# Patient Record
Sex: FEMALE | Race: BLACK OR AFRICAN AMERICAN | NOT HISPANIC OR LATINO | Employment: FULL TIME | URBAN - METROPOLITAN AREA
[De-identification: names, ages, dates, MRNs, and addresses within clinical notes are randomized per-mention and may not be internally consistent; named-entity substitution may affect disease eponyms.]

---

## 2017-02-08 ENCOUNTER — OFFICE VISIT (OUTPATIENT)
Dept: INTERNAL MEDICINE | Facility: CLINIC | Age: 37
End: 2017-02-08
Payer: COMMERCIAL

## 2017-02-08 VITALS
BODY MASS INDEX: 32.38 KG/M2 | TEMPERATURE: 98 F | SYSTOLIC BLOOD PRESSURE: 144 MMHG | HEART RATE: 90 BPM | WEIGHT: 182.75 LBS | HEIGHT: 63 IN | DIASTOLIC BLOOD PRESSURE: 100 MMHG | OXYGEN SATURATION: 99 %

## 2017-02-08 DIAGNOSIS — R09.81 NASAL CONGESTION: Primary | ICD-10-CM

## 2017-02-08 DIAGNOSIS — I10 ESSENTIAL HYPERTENSION: ICD-10-CM

## 2017-02-08 DIAGNOSIS — R09.89 CHEST CONGESTION: ICD-10-CM

## 2017-02-08 PROCEDURE — 3077F SYST BP >= 140 MM HG: CPT | Mod: S$GLB,,, | Performed by: PHYSICIAN ASSISTANT

## 2017-02-08 PROCEDURE — 99999 PR PBB SHADOW E&M-EST. PATIENT-LVL III: CPT | Mod: PBBFAC,,, | Performed by: PHYSICIAN ASSISTANT

## 2017-02-08 PROCEDURE — 3080F DIAST BP >= 90 MM HG: CPT | Mod: S$GLB,,, | Performed by: PHYSICIAN ASSISTANT

## 2017-02-08 PROCEDURE — 99213 OFFICE O/P EST LOW 20 MIN: CPT | Mod: S$GLB,,, | Performed by: PHYSICIAN ASSISTANT

## 2017-02-08 RX ORDER — PROMETHAZINE HYDROCHLORIDE AND DEXTROMETHORPHAN HYDROBROMIDE 6.25; 15 MG/5ML; MG/5ML
5 SYRUP ORAL EVERY 6 HOURS PRN
Qty: 118 ML | Refills: 0 | Status: SHIPPED | OUTPATIENT
Start: 2017-02-08 | End: 2017-02-18

## 2017-02-08 RX ORDER — ALBUTEROL SULFATE 90 UG/1
1-2 AEROSOL, METERED RESPIRATORY (INHALATION) EVERY 6 HOURS PRN
Qty: 1 INHALER | Refills: 0 | Status: SHIPPED | OUTPATIENT
Start: 2017-02-08 | End: 2017-03-08

## 2017-02-08 NOTE — MR AVS SNAPSHOT
[facility]  [address] [place] [address]  Phone: [phone]  Fax: [phone]                  Sarah Brown   2017 11:20 AM   Office Visit    Description:  Female : 1980   Provider:  KING Rebollar   [facility]           Reason for Visit     Cough     Nasal Congestion     Chest Pain     Back Pain           Diagnoses this Visit        Comments    Nasal congestion    -  Primary     Chest congestion                To Do List           Goals (5 Years of Data)     None       These Medications        Disp Refills Start End    promethazine-dextromethorphan (PROMETHAZINE-DM) 6.25-15 mg/5 mL Syrp 118 mL 0 2017    Take 5 mLs by mouth every 6 (six) hours as needed (cough). - Oral    Pharmacy: [facility]/pharmacy #[id number]  [place] - [address] [address] [facility] Ph #: [phone]       albuterol 90 mcg/actuation inhaler 1 Inhaler 0 2017    Inhale 1-2 puffs into the lungs every 6 (six) hours as needed for Wheezing. - Inhalation    Pharmacy: [facility]/pharmacy #[id number]  [place], [place] - [address] [address] [facility] Ph #: [phone]         [facility]     [facility] Nurse Care Line -  Assistance  Registered nurses in the [facility] provide clinical advisement, health education, appointment booking, and other advisory services.  Call for this free service at [phone].             Medications           Message regarding Medications     Verify the changes and/or additions to your medication regime listed below are the same as discussed with your clinician today.  If any of these changes or additions are incorrect, please notify your healthcare provider.        START taking these NEW medications        Refills    promethazine-dextromethorphan (PROMETHAZINE-DM) 6.25-15 mg/5 mL Syrp 0    Sig: Take 5 mLs by mouth every 6 (six) hours as needed (cough).    Class: Normal    Route: Oral    albuterol 90  "mcg/actuation inhaler 0    Sig: Inhale 1-2 puffs into the lungs every 6 (six) hours as needed for Wheezing.    Class: Normal    Route: Inhalation           Verify that the below list of medications is an accurate representation of the medications you are currently taking.  If none reported, the list may be blank. If incorrect, please contact your healthcare provider. Carry this list with you in case of emergency.           Current Medications     amlodipine (NORVASC) 5 MG tablet Take 1 tablet (5 mg total) by mouth once daily.    diclofenac (VOLTAREN) 50 MG EC tablet Take 1 tablet (50 mg total) by mouth 2 (two) times daily.    hydrocortisone-pramoxine (ANALPRAM-HC) 2.5-1 % Crea Place rectally 3 (three) times daily.    lisinopril-hydrochlorothiazide (PRINZIDE,ZESTORETIC) 20-12.5 mg per tablet Take 1 tablet by mouth once daily.    albuterol 90 mcg/actuation inhaler Inhale 1-2 puffs into the lungs every 6 (six) hours as needed for Wheezing.    promethazine-dextromethorphan (PROMETHAZINE-DM) 6.25-15 mg/5 mL Syrp Take 5 mLs by mouth every 6 (six) hours as needed (cough).           Clinical Reference Information           Your Vitals Were     BP Pulse Temp Height Weight SpO2    144/100 (BP Location: Left arm, Patient Position: Sitting, BP Method: Manual) 90 98.3 °F (36.8 °C) (Oral) 5' 3" (1.6 m) 82.9 kg (182 lb 12.2 oz) 99%    BMI                32.37 kg/m2          Blood Pressure          Most Recent Value    BP  (!)  144/100      Allergies as of 2/8/2017     No Known Allergies      Immunizations Administered on Date of Encounter - 2/8/2017     None      Language Assistance Services     ATTENTION: Language assistance services are available, free of charge. Please call [phone].      ATENCIÓN: Si lynnela andriy, tiene a mcclendon disposición servicios gratuitos de asistencia lingüística. Llame al [phone].     CHÚ Ý: N?u b?n nói Ti?ng Vi?t, có các d?ch v? h? tr? ngôn ng? mi?n phí dành cho b?n. G?i s? [phone].   "       [facility] complies with applicable Federal civil rights laws and does not discriminate on the basis of race, color, national origin, age, disability, or sex.

## 2017-02-08 NOTE — PROGRESS NOTES
Subjective:       Patient ID: Sarah Brown is a 36 y.o. female.    Chief Complaint: Cough; Nasal Congestion; Chest Pain; and Back Pain    URI    This is a new problem. Episode onset: 4 days. The problem has been unchanged. There has been no fever. Associated symptoms include congestion, coughing, sinus pain, a sore throat and swollen glands. Pertinent negatives include no abdominal pain, chest pain, diarrhea, dysuria, ear pain, headaches, joint pain, joint swelling, nausea, neck pain, plugged ear sensation, rash, sneezing, vomiting or wheezing. Treatments tried: dayquil        Past Medical History   Diagnosis Date    Hypertension      diet controled    Migraines      with aura, per pt       Current Outpatient Prescriptions   Medication Sig Dispense Refill    amlodipine (NORVASC) 5 MG tablet Take 1 tablet (5 mg total) by mouth once daily. 30 tablet 3    diclofenac (VOLTAREN) 50 MG EC tablet Take 1 tablet (50 mg total) by mouth 2 (two) times daily. 60 tablet 1    hydrocortisone-pramoxine (ANALPRAM-HC) 2.5-1 % Crea Place rectally 3 (three) times daily. 1 Tube 0    lisinopril-hydrochlorothiazide (PRINZIDE,ZESTORETIC) 20-12.5 mg per tablet Take 1 tablet by mouth once daily. 30 tablet 0    albuterol 90 mcg/actuation inhaler Inhale 1-2 puffs into the lungs every 6 (six) hours as needed for Wheezing. 1 Inhaler 0    promethazine-dextromethorphan (PROMETHAZINE-DM) 6.25-15 mg/5 mL Syrp Take 5 mLs by mouth every 6 (six) hours as needed (cough). 118 mL 0     No current facility-administered medications for this visit.        Review of Systems   HENT: Positive for congestion and sore throat. Negative for ear pain and sneezing.    Respiratory: Positive for cough. Negative for wheezing.    Cardiovascular: Negative for chest pain.   Gastrointestinal: Negative for abdominal pain, diarrhea, nausea and vomiting.   Genitourinary: Negative for dysuria.   Musculoskeletal: Negative for joint pain and neck pain.   Skin: Negative  "for rash.   Neurological: Negative for headaches.       Objective:     Visit Vitals    BP (!) 144/100 (BP Location: Left arm, Patient Position: Sitting, BP Method: Manual)    Pulse 90    Temp 98.3 °F (36.8 °C) (Oral)    Ht 5' 3" (1.6 m)    Wt 82.9 kg (182 lb 12.2 oz)    SpO2 99%    BMI 32.37 kg/m2        Physical Exam   Constitutional: She is oriented to person, place, and time. She appears well-developed and well-nourished. No distress.   HENT:   Head: Normocephalic and atraumatic.   Right Ear: Hearing, tympanic membrane, external ear and ear canal normal.   Left Ear: Hearing, tympanic membrane, external ear and ear canal normal.   Nose: No sinus tenderness. Right sinus exhibits no maxillary sinus tenderness and no frontal sinus tenderness. Left sinus exhibits no maxillary sinus tenderness and no frontal sinus tenderness.   Mouth/Throat: Uvula is midline, oropharynx is clear and moist and mucous membranes are normal. No oropharyngeal exudate, posterior oropharyngeal edema, posterior oropharyngeal erythema or tonsillar abscesses.   Swollen nasal turbinates   Post nasal drip    Eyes: Conjunctivae are normal. Pupils are equal, round, and reactive to light.   Neck: Normal range of motion. Neck supple.   Cardiovascular: Normal rate, regular rhythm and normal heart sounds.    Pulmonary/Chest: Effort normal and breath sounds normal. No respiratory distress.   Lymphadenopathy:     She has no cervical adenopathy.   Neurological: She is alert and oriented to person, place, and time.   Skin: Skin is warm.   Psychiatric: She has a normal mood and affect. Her behavior is normal. Judgment and thought content normal.   Vitals reviewed.        Lab Results   Component Value Date    WBC 3.59 (L) 10/27/2016    HGB 12.9 10/27/2016    HCT 38.9 10/27/2016     10/27/2016    CHOL 149 10/11/2013    TRIG 85 10/11/2013    HDL 47 10/11/2013    ALT 17 10/11/2013    AST 15 10/11/2013     10/27/2016    K 4.2 10/27/2016    CL " 107 10/27/2016    CREATININE 0.8 10/27/2016    BUN 9 10/27/2016    CO2 25 10/27/2016    TSH 0.929 10/27/2016       Assessment:       1. Nasal congestion    2. Chest congestion    3. Essential hypertension        Plan:   Nasal congestion  Saline sprays, cough medication will have antihistamine in it.  May use Afrin spray for up to 3 days to get some relief.   Chest congestion- suggested MUCINEX (Avoid decongestants because of blood pressure)   -     albuterol 90 mcg/actuation inhaler; Inhale 1-2 puffs into the lungs every 6 (six) hours as needed for Wheezing.  Dispense: 1 Inhaler; Refill: 0  Nasopharyngitis   -     promethazine-dextromethorphan (PROMETHAZINE-DM) 6.25-15 mg/5 mL Syrp; Take 5 mLs by mouth every 6 (six) hours as needed (cough).  Dispense: 118 mL; Refill: 0    Essential hypertension   Avoid oral decongestants as this is likely the reason for elevated  BP

## 2017-03-08 ENCOUNTER — OFFICE VISIT (OUTPATIENT)
Dept: INTERNAL MEDICINE | Facility: CLINIC | Age: 37
End: 2017-03-08
Payer: COMMERCIAL

## 2017-03-08 ENCOUNTER — TELEPHONE (OUTPATIENT)
Dept: INTERNAL MEDICINE | Facility: CLINIC | Age: 37
End: 2017-03-08

## 2017-03-08 VITALS
OXYGEN SATURATION: 98 % | HEIGHT: 63 IN | DIASTOLIC BLOOD PRESSURE: 94 MMHG | HEART RATE: 75 BPM | WEIGHT: 177.69 LBS | BODY MASS INDEX: 31.48 KG/M2 | TEMPERATURE: 99 F | SYSTOLIC BLOOD PRESSURE: 162 MMHG

## 2017-03-08 DIAGNOSIS — F32.A DEPRESSION, UNSPECIFIED DEPRESSION TYPE: ICD-10-CM

## 2017-03-08 DIAGNOSIS — I10 ESSENTIAL HYPERTENSION: Primary | ICD-10-CM

## 2017-03-08 DIAGNOSIS — G43.019 INTRACTABLE MIGRAINE WITHOUT AURA AND WITHOUT STATUS MIGRAINOSUS: ICD-10-CM

## 2017-03-08 PROCEDURE — 99214 OFFICE O/P EST MOD 30 MIN: CPT | Mod: S$GLB,,, | Performed by: FAMILY MEDICINE

## 2017-03-08 PROCEDURE — 3080F DIAST BP >= 90 MM HG: CPT | Mod: S$GLB,,, | Performed by: FAMILY MEDICINE

## 2017-03-08 PROCEDURE — 99999 PR PBB SHADOW E&M-EST. PATIENT-LVL III: CPT | Mod: PBBFAC,,, | Performed by: FAMILY MEDICINE

## 2017-03-08 PROCEDURE — 1160F RVW MEDS BY RX/DR IN RCRD: CPT | Mod: S$GLB,,, | Performed by: FAMILY MEDICINE

## 2017-03-08 PROCEDURE — 3077F SYST BP >= 140 MM HG: CPT | Mod: S$GLB,,, | Performed by: FAMILY MEDICINE

## 2017-03-08 RX ORDER — LISINOPRIL AND HYDROCHLOROTHIAZIDE 12.5; 2 MG/1; MG/1
1 TABLET ORAL DAILY
Qty: 90 TABLET | Refills: 0 | Status: SHIPPED | OUTPATIENT
Start: 2017-03-08 | End: 2019-12-11

## 2017-03-08 RX ORDER — NORTRIPTYLINE HYDROCHLORIDE 50 MG/1
50 CAPSULE ORAL NIGHTLY
Qty: 30 CAPSULE | Refills: 0 | Status: SHIPPED | OUTPATIENT
Start: 2017-03-08 | End: 2019-07-10

## 2017-03-08 RX ORDER — NAPROXEN SODIUM 550 MG/1
550 TABLET ORAL 2 TIMES DAILY PRN
Qty: 30 TABLET | Refills: 0 | Status: SHIPPED | OUTPATIENT
Start: 2017-03-08 | End: 2019-07-10

## 2017-03-08 RX ORDER — AMLODIPINE BESYLATE 5 MG/1
5 TABLET ORAL DAILY
Qty: 90 TABLET | Refills: 0 | Status: SHIPPED | OUTPATIENT
Start: 2017-03-08 | End: 2019-07-10 | Stop reason: SDUPTHER

## 2017-03-08 NOTE — PROGRESS NOTES
Subjective:   Patient ID: Sarah Brown is a 36 y.o. female.  Chief Complaint:  No chief complaint on file.    HPI Comments: Chronic recurrent migraine headaches. Normal labs and CT in past. No Neurology consult.6  Poor historian, but unclear if any preventative migraine medicines used in past.  No present prescription meds    Blood pressure not controlled, out of both medications.    Depressed mood.  Reports previous anxiety issues treated with unknown medication.    Migraine    This is a chronic problem. The current episode started more than 1 year ago. The problem occurs daily. The problem has been unchanged. The pain is located in the right unilateral, frontal and temporal region. The pain does not radiate. The pain quality is similar to prior headaches. The quality of the pain is described as sharp and stabbing. The pain is at a severity of 10/10. The pain is severe. Associated symptoms include back pain and neck pain. Pertinent negatives include no abdominal pain, abnormal behavior, anorexia, blurred vision, coughing, dizziness, drainage, ear pain, eye pain, eye redness, eye watering, facial sweating, fever, hearing loss, insomnia, loss of balance, muscle aches, nausea, numbness, phonophobia, photophobia, rhinorrhea, scalp tenderness, seizures, sinus pressure, sore throat, swollen glands, tingling, tinnitus, visual change, vomiting, weakness or weight loss. The symptoms are aggravated by work and emotional stress. She has tried acetaminophen, NSAIDs and ketorolac injections for the symptoms. The treatment provided moderate relief. Her past medical history is significant for hypertension and migraine headaches.   Mental Health Problem   The primary symptoms include dysphoric mood. The current episode started more than 1 month ago.   The dysphoric mood began more than 2 weeks ago. The mood has been worsening since its onset. The mood includes feelings of sadness and tearfulness.   The degree of incapacity that  she is experiencing as a consequence of her illness is moderate. Additional symptoms of the illness include anhedonia, fatigue, psychomotor retardation, attention impairment and headaches. Additional symptoms of the illness do not include no insomnia, no hypersomnia, no appetite change, no unexpected weight change, no agitation, no feelings of worthlessness, no euphoric mood, no increased goal-directed activity, no flight of ideas, no inflated self-esteem, no decreased need for sleep, not distractible, no poor judgment, no visual change, no abdominal pain or no seizures. She does not admit to suicidal ideas. She does not have a plan to commit suicide. She does not contemplate harming herself. She has not already injured self. She does not contemplate injuring another person. She has not already  injured another person. Risk factors that are present for mental illness include a history of mental illness.     Review of Systems   Constitutional: Positive for fatigue. Negative for appetite change, fever, unexpected weight change and weight loss.   HENT: Negative for ear pain, hearing loss, rhinorrhea, sinus pressure, sore throat and tinnitus.    Eyes: Negative for blurred vision, photophobia, pain and redness.   Respiratory: Negative for cough, chest tightness, shortness of breath and wheezing.    Cardiovascular: Negative for chest pain, palpitations and leg swelling.   Gastrointestinal: Negative for abdominal pain, anorexia, constipation, diarrhea, nausea and vomiting.   Genitourinary: Negative for difficulty urinating.   Musculoskeletal: Positive for back pain and neck pain. Negative for myalgias.   Skin: Negative for rash.   Neurological: Positive for headaches. Negative for dizziness, tingling, seizures, syncope, weakness, light-headedness, numbness and loss of balance.   Psychiatric/Behavioral: Positive for dysphoric mood. Negative for agitation. The patient does not have insomnia.        Current Outpatient  "Prescriptions:     amlodipine (NORVASC) 5 MG tablet, Take 1 tablet (5 mg total) by mouth once daily., Disp: 90 tablet, Rfl: 0    lisinopril-hydrochlorothiazide (PRINZIDE,ZESTORETIC) 20-12.5 mg per tablet, Take 1 tablet by mouth once daily., Disp: 90 tablet, Rfl: 0    naproxen sodium (ANAPROX) 550 MG tablet, Take 1 tablet (550 mg total) by mouth 2 (two) times daily as needed (Headache)., Disp: 30 tablet, Rfl: 0    nortriptyline (PAMELOR) 50 MG capsule, Take 1 capsule (50 mg total) by mouth every evening., Disp: 30 capsule, Rfl: 0     Objective:   BP (!) 162/94 (BP Location: Right arm, Patient Position: Sitting, BP Method: Manual)  Pulse 75  Temp 98.6 °F (37 °C) (Tympanic)   Ht 5' 3" (1.6 m)  Wt 80.6 kg (177 lb 11.1 oz)  LMP 02/08/2017  SpO2 98%  BMI 31.48 kg/m2     Physical Exam   Constitutional: She is oriented to person, place, and time. Vital signs are normal. She appears well-developed and well-nourished. No distress.   Eyes: Conjunctivae, EOM and lids are normal. Pupils are equal, round, and reactive to light.   Neck: No JVD present.   Cardiovascular: Normal rate, regular rhythm and normal heart sounds.  Exam reveals no gallop and no friction rub.    No murmur heard.  Pulmonary/Chest: Effort normal and breath sounds normal. She has no wheezes. She has no rhonchi. She has no rales.   Abdominal: Soft. She exhibits no distension. There is no tenderness.   Musculoskeletal: She exhibits no edema.   Neurological: She is alert and oriented to person, place, and time. She displays a negative Romberg sign. Coordination and gait normal.   Skin: Skin is warm and dry. No rash noted.   Psychiatric: Judgment and thought content normal. Her mood appears not anxious. Her affect is not angry, not blunt and not inappropriate. Her speech is delayed. Her speech is not rapid and/or pressured, not tangential and not slurred. She is slowed and withdrawn. She is not agitated, not aggressive, not hyperactive, not actively " hallucinating and not combative. Cognition and memory are normal. She exhibits a depressed mood. She is communicative.   Depressed mood.  Flat affect, terrible eye contact, monotone speech. She is inattentive.   Nursing note and vitals reviewed.    Assessment:     1. Essential hypertension    2. Intractable migraine without aura and without status migrainosus    3. Depression, unspecified depression type      Plan:   Essential hypertension  -     amlodipine (NORVASC) 5 MG tablet; Take 1 tablet (5 mg total) by mouth once daily.  Dispense: 90 tablet; Refill: 0  -     lisinopril-hydrochlorothiazide (PRINZIDE,ZESTORETIC) 20-12.5 mg per tablet; Take 1 tablet by mouth once daily.  Dispense: 90 tablet; Refill: 0  Restart blood pressure medication.  Discussed may also benefit with headache control.  Recheck 1 month.    Intractable migraine without aura and without status migrainosus  -     nortriptyline (PAMELOR) 50 MG capsule; Take 1 capsule (50 mg total) by mouth every evening.  Dispense: 30 capsule; Refill: 0  -     naproxen sodium (ANAPROX) 550 MG tablet; Take 1 tablet (550 mg total) by mouth 2 (two) times daily as needed (Headache).  Dispense: 30 tablet; Refill: 0  Start Pamelor nightly for prophylaxis.  Anaprox DS as needed.    If above no help and no improvement headache pattern with psychiatric treatment, consider Topamax or neurology referral.    Depression, unspecified depression type  -     Ambulatory Referral to Psychiatry  Patient agrees to contact psychiatry for appointment.    Return to clinic 1 month.

## 2017-03-08 NOTE — LETTER
March 8, 2017                 [facility]  Internal Medicine  [address] [place] [address]  Phone: [phone]  Fax: [phone]   March 8, 2017     Patient: Sarah Brown   YOB: 1980   Date of Visit: 3/8/2017       To Whom it May Concern:    Sarah Brown was seen in my clinic on 3/8/2017. Please excuse her for 3/7/2017 and 3/8/2017.    If you have any questions or concerns, please don't hesitate to call.    Sincerely,         Kunal Ramos MD/Dee Burr LPN

## 2017-03-08 NOTE — TELEPHONE ENCOUNTER
Pt stated that psychiatry doesn't have anything available until May 1st. Pt is wanting other recommendations for psychiatrist. Told pt that I would discuss with Dr. Ramos and also advised that she contact her insurance to see who they cover. Pt verbalized understanding.

## 2017-03-08 NOTE — MR AVS SNAPSHOT
[facility]  [address] [place] [address]  Phone: [phone]  Fax: [phone]                  Sarah HEMPHILL Stephanie   3/8/2017 7:20 AM   Office Visit    Description:  Female : 1980   Provider:  Kunal Ramos MD   Department:  [facility] - Internal Medicine           Diagnoses this Visit        Comments    Essential hypertension    -  Primary     Intractable migraine without aura and without status migrainosus         Depression, unspecified depression type                To Do List           Future Appointments        Provider Department Dept Phone    2017 8:00 AM Jennifer Rose MD [facility] [phone]      Goals (5 Years of Data)     None      Follow-Up and Disposition     Return in about 1 month (around 2017) for Dr Rose.       These Medications        Disp Refills Start End    amlodipine (NORVASC) 5 MG tablet 90 tablet 0 3/8/2017 3/8/2018    Take 1 tablet (5 mg total) by mouth once daily. - Oral    Pharmacy: [facility]/pharmacy #[address] [place] [address] Ph #: [phone]       lisinopril-hydrochlorothiazide (PRINZIDE,ZESTORETIC) 20-12.5 mg per tablet 90 tablet 0 3/8/2017 3/8/2018    Take 1 tablet by mouth once daily. - Oral    Pharmacy: [facility]/pharmacy #[address] [place], [place] [address] [facility] Ph #: [phone]       nortriptyline (PAMELOR) 50 MG capsule 30 capsule 0 3/8/2017 3/8/2018    Take 1 capsule (50 mg total) by mouth every evening. - Oral    Pharmacy: [facility]/pharmacy #[address] [place], [place] [address] [facility] Ph #: [phone]       naproxen sodium (ANAPROX) 550 MG tablet 30 tablet 0 3/8/2017     Take 1 tablet (550 mg total) by mouth 2 (two) times daily as needed (Headache). - Oral    Pharmacy: [facility]/pharmacy #[address] [place], [place] [address] [id number] [facility] Ph #: [phone]         [facility]     [facility] Care Line -  Assistance  Registered nurses in the [facility] provide  clinical advisement, health education, appointment booking, and other advisory services.  Call for this free service at [phone].             Medications           Message regarding Medications     Verify the changes and/or additions to your medication regime listed below are the same as discussed with your clinician today.  If any of these changes or additions are incorrect, please notify your healthcare provider.        START taking these NEW medications        Refills    nortriptyline (PAMELOR) 50 MG capsule 0    Sig: Take 1 capsule (50 mg total) by mouth every evening.    Class: Normal    Route: Oral    naproxen sodium (ANAPROX) 550 MG tablet 0    Sig: Take 1 tablet (550 mg total) by mouth 2 (two) times daily as needed (Headache).    Class: Normal    Route: Oral      STOP taking these medications     albuterol 90 mcg/actuation inhaler Inhale 1-2 puffs into the lungs every 6 (six) hours as needed for Wheezing.    hydrocortisone-pramoxine (ANALPRAM-HC) 2.5-1 % Crea Place rectally 3 (three) times daily.    diclofenac (VOLTAREN) 50 MG EC tablet Take 1 tablet (50 mg total) by mouth 2 (two) times daily.           Verify that the below list of medications is an accurate representation of the medications you are currently taking.  If none reported, the list may be blank. If incorrect, please contact your healthcare provider. Carry this list with you in case of emergency.           Current Medications     amlodipine (NORVASC) 5 MG tablet Take 1 tablet (5 mg total) by mouth once daily.    lisinopril-hydrochlorothiazide (PRINZIDE,ZESTORETIC) 20-12.5 mg per tablet Take 1 tablet by mouth once daily.    naproxen sodium (ANAPROX) 550 MG tablet Take 1 tablet (550 mg total) by mouth 2 (two) times daily as needed (Headache).    nortriptyline (PAMELOR) 50 MG capsule Take 1 capsule (50 mg total) by mouth every evening.           Clinical Reference Information           Your Vitals Were     BP Pulse Temp Height    162/94 (BP  "Location: Right arm, Patient Position: Sitting, BP Method: Manual) 75 98.6 °F (37 °C) (Tympanic) 5' 3" (1.6 m)    Weight Last Period SpO2 BMI    80.6 kg (177 lb 11.1 oz) 02/08/2017 98% 31.48 kg/m2      Blood Pressure          Most Recent Value    BP  (!)  162/94      Allergies as of 3/8/2017     No Known Allergies      Immunizations Administered on Date of Encounter - 3/8/2017     None      Orders Placed During Today's Visit      Normal Orders This Visit    Ambulatory Referral to Psychiatry       Language Assistance Services     ATTENTION: Language assistance services are available, free of charge. Please call [phone].      ATENCIÓN: Si habla andriy, tiene a mcclendon disposición servicios gratuitos de asistencia lingüística. Llame al [phone].     CLARK Ý: N?u b?n nói Ti?ng Vi?t, có các d?ch v? h? tr? ngôn ng? mi?n phí dành cho b?n. G?i s? [phone].         [facility] - [facility] complies with applicable Federal civil rights laws and does not discriminate on the basis of race, color, national origin, age, disability, or sex.        "

## 2017-03-08 NOTE — TELEPHONE ENCOUNTER
MD Dee Li LPN        Caller: Unspecified (Today,  8:17 AM)                     Needs to contact her insurance to see what psychiatry providers are on her plan.   All psychiatrist require patient to call directly for appointment.   Once she makes contact/has an appointment we can complete referral if needed.   If significant worsening of her mood before she can be seen as an outpatient, go to the [facility] ER for evaluation

## 2017-03-22 ENCOUNTER — PATIENT OUTREACH (OUTPATIENT)
Dept: ADMINISTRATIVE | Facility: HOSPITAL | Age: 37
End: 2017-03-22

## 2018-04-10 RX ORDER — ALBUTEROL SULFATE 90 UG/1
1-2 AEROSOL, METERED RESPIRATORY (INHALATION) EVERY 6 HOURS PRN
Qty: 8.5 INHALER | Refills: 0 | OUTPATIENT
Start: 2018-04-10 | End: 2018-04-20

## 2018-06-28 ENCOUNTER — PATIENT OUTREACH (OUTPATIENT)
Dept: ADMINISTRATIVE | Facility: HOSPITAL | Age: 38
End: 2018-06-28

## 2018-06-28 NOTE — PROGRESS NOTES
I have attempted without success to contact this patient to schedule an appointment for annual to include blood pressure recheck and other health maintenance. Patient not available, left voicemail.

## 2018-07-11 ENCOUNTER — PATIENT OUTREACH (OUTPATIENT)
Dept: ADMINISTRATIVE | Facility: HOSPITAL | Age: 38
End: 2018-07-11

## 2018-07-11 NOTE — PROGRESS NOTES
I have attempted without success to contact this patient to schedule an appointment for annual to include blood pressure recheck, cervical cancer screening and other health maintenance. Patient not available, left voicemail. (second attempt)

## 2018-09-19 ENCOUNTER — PATIENT OUTREACH (OUTPATIENT)
Dept: ADMINISTRATIVE | Facility: HOSPITAL | Age: 38
End: 2018-09-19

## 2018-12-13 ENCOUNTER — PATIENT OUTREACH (OUTPATIENT)
Dept: ADMINISTRATIVE | Facility: HOSPITAL | Age: 38
End: 2018-12-13

## 2019-07-01 ENCOUNTER — TELEPHONE (OUTPATIENT)
Dept: OBSTETRICS AND GYNECOLOGY | Facility: CLINIC | Age: 39
End: 2019-07-01

## 2019-07-01 ENCOUNTER — TELEPHONE (OUTPATIENT)
Dept: INTERNAL MEDICINE | Facility: CLINIC | Age: 39
End: 2019-07-01

## 2019-07-01 DIAGNOSIS — Z12.39 BREAST SCREENING: Primary | ICD-10-CM

## 2019-07-01 NOTE — TELEPHONE ENCOUNTER
Spoke to patient and scheduled her appointment for 07/10/19 at 2:15pm to see Dr. Tam at the [place] location. Patient verbalized understanding.

## 2019-07-01 NOTE — TELEPHONE ENCOUNTER
----- Message from Kaleigh Wang sent at 7/1/2019  3:59 PM CDT -----  Contact: self  needs to schedule 7/10 at [facility] for e...[phone] ([place])

## 2019-07-01 NOTE — TELEPHONE ENCOUNTER
----- Message from Shady Laughlin MA sent at 7/1/2019  4:10 PM CDT -----  Patient is requesting MMG order be put in so an appointment can be scheduled.  ----- Message -----  From: Kaleigh Wang  Sent: 7/1/2019   4:00 PM  To: [facility] Rodriguez Staff    .Type:  Mammogram    Caller is requesting to schedule their annual mammogram appointment.  Order is not listed in EPIC.  Please enter order and contact patient to schedule.  Name of Caller:self  Where would they [facility] performed[facility]  Would the patient rather a call back or a response via [facility]? call  Best Call Back Number:.[phone] (home)   Additional Information:

## 2019-07-02 NOTE — TELEPHONE ENCOUNTER
Spoke with patient to schedule MMG. Was able to schedule patient on 7/10/2019. Patient expressed understanding and voiced no other questions or concerns./[email or web address]

## 2019-07-10 ENCOUNTER — HOSPITAL ENCOUNTER (OUTPATIENT)
Dept: RADIOLOGY | Facility: HOSPITAL | Age: 39
Discharge: HOME | End: 2019-07-10
Attending: FAMILY MEDICINE
Payer: COMMERCIAL

## 2019-07-10 ENCOUNTER — OFFICE VISIT (OUTPATIENT)
Dept: INTERNAL MEDICINE | Facility: CLINIC | Age: 39
End: 2019-07-10
Payer: COMMERCIAL

## 2019-07-10 VITALS
HEART RATE: 75 BPM | RESPIRATION RATE: 16 BRPM | HEIGHT: 63 IN | BODY MASS INDEX: 26.49 KG/M2 | SYSTOLIC BLOOD PRESSURE: 146 MMHG | OXYGEN SATURATION: 99 % | TEMPERATURE: 99 F | DIASTOLIC BLOOD PRESSURE: 92 MMHG | WEIGHT: 149.5 LBS

## 2019-07-10 DIAGNOSIS — G43.019 INTRACTABLE MIGRAINE WITHOUT AURA AND WITHOUT STATUS MIGRAINOSUS: ICD-10-CM

## 2019-07-10 DIAGNOSIS — B35.3 TINEA PEDIS OF BOTH FEET: ICD-10-CM

## 2019-07-10 DIAGNOSIS — Z12.39 BREAST SCREENING: ICD-10-CM

## 2019-07-10 DIAGNOSIS — Z23 NEED FOR DIPHTHERIA-TETANUS-PERTUSSIS (TDAP) VACCINE: ICD-10-CM

## 2019-07-10 DIAGNOSIS — Z00.00 ROUTINE GENERAL MEDICAL EXAMINATION AT A HEALTH CARE FACILITY: Primary | ICD-10-CM

## 2019-07-10 DIAGNOSIS — I10 ESSENTIAL HYPERTENSION: ICD-10-CM

## 2019-07-10 DIAGNOSIS — M47.26 OSTEOARTHRITIS OF SPINE WITH RADICULOPATHY, LUMBAR REGION: ICD-10-CM

## 2019-07-10 DIAGNOSIS — F41.8 ANXIETY ASSOCIATED WITH DEPRESSION: ICD-10-CM

## 2019-07-10 PROCEDURE — 3080F PR MOST RECENT DIASTOLIC BLOOD PRESSURE >= 90 MM HG: ICD-10-PCS | Mod: CPTII,S$GLB,, | Performed by: FAMILY MEDICINE

## 2019-07-10 PROCEDURE — 90715 TDAP VACCINE 7 YRS/> IM: CPT | Mod: S$GLB,,, | Performed by: FAMILY MEDICINE

## 2019-07-10 PROCEDURE — 99999 PR PBB SHADOW E&M-EST. PATIENT-LVL III: CPT | Mod: PBBFAC,,, | Performed by: FAMILY MEDICINE

## 2019-07-10 PROCEDURE — 99396 PR PREVENTIVE VISIT,EST,40-64: ICD-10-PCS | Mod: 25,S$GLB,, | Performed by: FAMILY MEDICINE

## 2019-07-10 PROCEDURE — 90715 TDAP VACCINE GREATER THAN OR EQUAL TO 7YO IM: ICD-10-PCS | Mod: S$GLB,,, | Performed by: FAMILY MEDICINE

## 2019-07-10 PROCEDURE — 3077F PR MOST RECENT SYSTOLIC BLOOD PRESSURE >= 140 MM HG: ICD-10-PCS | Mod: CPTII,S$GLB,, | Performed by: FAMILY MEDICINE

## 2019-07-10 PROCEDURE — 90471 IMMUNIZATION ADMIN: CPT | Mod: 59,S$GLB,, | Performed by: FAMILY MEDICINE

## 2019-07-10 PROCEDURE — 3008F BODY MASS INDEX DOCD: CPT | Mod: CPTII,S$GLB,, | Performed by: FAMILY MEDICINE

## 2019-07-10 PROCEDURE — 3077F SYST BP >= 140 MM HG: CPT | Mod: CPTII,S$GLB,, | Performed by: FAMILY MEDICINE

## 2019-07-10 PROCEDURE — 3080F DIAST BP >= 90 MM HG: CPT | Mod: CPTII,S$GLB,, | Performed by: FAMILY MEDICINE

## 2019-07-10 PROCEDURE — 99999 PR PBB SHADOW E&M-EST. PATIENT-LVL III: ICD-10-PCS | Mod: PBBFAC,,, | Performed by: FAMILY MEDICINE

## 2019-07-10 PROCEDURE — 99396 PREV VISIT EST AGE 40-64: CPT | Mod: 25,S$GLB,, | Performed by: FAMILY MEDICINE

## 2019-07-10 PROCEDURE — 90471 TDAP VACCINE GREATER THAN OR EQUAL TO 7YO IM: ICD-10-PCS | Mod: 59,S$GLB,, | Performed by: FAMILY MEDICINE

## 2019-07-10 PROCEDURE — 3008F PR BODY MASS INDEX (BMI) DOCUMENTED: ICD-10-PCS | Mod: CPTII,S$GLB,, | Performed by: FAMILY MEDICINE

## 2019-07-10 RX ORDER — AMLODIPINE BESYLATE 5 MG/1
5 TABLET ORAL DAILY
Qty: 90 TABLET | Refills: 0 | Status: SHIPPED | OUTPATIENT
Start: 2019-07-10 | End: 2019-10-02 | Stop reason: SDUPTHER

## 2019-07-10 RX ORDER — CLOTRIMAZOLE 1 %
CREAM (GRAM) TOPICAL 2 TIMES DAILY
Qty: 28 G | Refills: 0 | Status: SHIPPED | OUTPATIENT
Start: 2019-07-10 | End: 2019-12-11

## 2019-07-10 RX ORDER — SERTRALINE HYDROCHLORIDE 100 MG/1
TABLET, FILM COATED ORAL
COMMUNITY
Start: 2019-06-25 | End: 2020-07-22

## 2019-07-10 RX ORDER — EMTRICITABINE AND TENOFOVIR DISOPROXIL FUMARATE 200; 300 MG/1; MG/1
TABLET, FILM COATED ORAL
COMMUNITY
Start: 2019-07-05 | End: 2019-12-11

## 2019-07-10 RX ORDER — QUETIAPINE FUMARATE 100 MG/1
TABLET, FILM COATED ORAL
COMMUNITY
Start: 2019-06-25 | End: 2020-07-22

## 2019-07-10 NOTE — PROGRESS NOTES
"Subjective:       Patient ID: Sarah Brown is a 39 y.o. female.    Chief Complaint: Annual Exam    39-year-old female patient with Patient Active Problem List:     HTN (hypertension)     Migraines     Osteoarthritis of spine with radiculopathy, lumbar region     Anxiety associated with depression  Here for routine annual physicals.  Patient has been out of blood pressure medication lately, requesting refill today  Patient reports that a migraine headaches has been stable  Has been followed by [facility]  for anxiety associated with depression, and has been doing well with Seroquel  And Zoloft  Currently being treated for pre exposure to HIV prophylaxis, with Truvada   Denies any chest pain or difficulty breathing, abdominal discomfort nausea vomiting  Has been having rash in between the toenails for which she has tried over-the-counter Lamisil cream with no response    Review of Systems   Constitutional: Negative for fatigue.   Eyes: Negative for visual disturbance.   Respiratory: Negative for shortness of breath.    Cardiovascular: Negative for chest pain and leg swelling.   Gastrointestinal: Negative for abdominal pain, nausea and vomiting.   Musculoskeletal: Negative for myalgias.   Skin: Positive for rash.   Neurological: Negative for light-headedness and headaches.   Psychiatric/Behavioral: Positive for dysphoric mood. Negative for sleep disturbance. The patient is nervous/anxious.          BP (!) 146/92 (BP Location: Right arm, Patient Position: Sitting, BP Method: Medium (Manual))   Pulse 75   Temp 98.7 °F (37.1 °C) (Tympanic)   Resp 16   Ht 5' 3" (1.6 m)   Wt 67.8 kg (149 lb 7.6 oz)   SpO2 99%   BMI 26.48 kg/m²   Objective:      Physical Exam   Constitutional: She is oriented to person, place, and time. She appears well-developed and well-nourished.   HENT:   Head: Normocephalic and atraumatic.   Mouth/Throat: Oropharynx is clear and moist.   Cardiovascular: Normal rate, " regular rhythm and normal heart sounds.   No murmur heard.  Pulmonary/Chest: Effort normal and breath sounds normal. She has no wheezes.   Abdominal: Soft. Bowel sounds are normal. There is no tenderness.   Musculoskeletal: She exhibits no edema.   Neurological: She is alert and oriented to person, place, and time.   Skin: Skin is warm and dry. Rash noted.   Positive for hyperpigmented rash noted in between multiple toenails to bilateral feet   Psychiatric: She has a normal mood and affect.         Assessment/Plan:   1. Routine general medical examination at a health care facility  - CBC auto differential; Future  - Comprehensive metabolic panel; Future  - Lipid panel; Future  - TSH; Future  - Urinalysis; Future  Vital signs stable today.  Clinical exam stable.  Encouraged to work on lifestyle modifications with low-fat and low-cholesterol diet and exercise 30 min daily    2. Essential hypertension  - Comprehensive metabolic panel; Future  - Lipid panel; Future  - TSH; Future  - Urinalysis; Future  - amLODIPine (NORVASC) 5 MG tablet; Take 1 tablet (5 mg total) by mouth once daily.  Dispense: 90 tablet; Refill: 0  Blood pressure is  elevated but clinically stable, will give refill on amlodipine 5 mg  Follow-up in 1 week for blood pressure check as nurse visit    3. Intractable migraine without aura and without status migrainosus  Stable and asymptomatic    4. Osteoarthritis of spine with radiculopathy, lumbar region  Clinically doing well    5. Anxiety associated with depression  Followed by psychiatrist outside currently taking Seroquel and Zoloft    6. Need for diphtheria-tetanus-pertussis (Tdap) vaccine  - (In Office Administered) Tdap Vaccine  Tetanus booster given today    7. Tinea pedis of both feet  - clotrimazole (LOTRIMIN) 1 % cream; Apply topically 2 (two) times daily. Apply to bilateral toes  Dispense: 28 g; Refill: 0  No response noted with over-the-counter Lamisil cream  Will try clotrimazole cream

## 2019-07-17 ENCOUNTER — HOSPITAL ENCOUNTER (OUTPATIENT)
Dept: RADIOLOGY | Facility: HOSPITAL | Age: 39
Discharge: HOME | End: 2019-07-17
Attending: FAMILY MEDICINE
Payer: COMMERCIAL

## 2019-07-17 ENCOUNTER — CLINICAL SUPPORT (OUTPATIENT)
Dept: INTERNAL MEDICINE | Facility: CLINIC | Age: 39
End: 2019-07-17
Payer: COMMERCIAL

## 2019-07-17 ENCOUNTER — LAB VISIT (OUTPATIENT)
Dept: LAB | Facility: HOSPITAL | Age: 39
End: 2019-07-17
Attending: FAMILY MEDICINE
Payer: COMMERCIAL

## 2019-07-17 ENCOUNTER — OFFICE VISIT (OUTPATIENT)
Dept: OBSTETRICS AND GYNECOLOGY | Facility: CLINIC | Age: 39
End: 2019-07-17
Payer: COMMERCIAL

## 2019-07-17 VITALS — BODY MASS INDEX: 26.22 KG/M2 | HEIGHT: 63 IN | WEIGHT: 148 LBS

## 2019-07-17 VITALS
SYSTOLIC BLOOD PRESSURE: 133 MMHG | BODY MASS INDEX: 26.37 KG/M2 | DIASTOLIC BLOOD PRESSURE: 88 MMHG | WEIGHT: 148.81 LBS | HEIGHT: 63 IN

## 2019-07-17 VITALS — HEART RATE: 76 BPM | DIASTOLIC BLOOD PRESSURE: 82 MMHG | SYSTOLIC BLOOD PRESSURE: 130 MMHG

## 2019-07-17 DIAGNOSIS — Z01.419 ENCOUNTER FOR WELL WOMAN EXAM WITH ROUTINE GYNECOLOGICAL EXAM: Primary | ICD-10-CM

## 2019-07-17 DIAGNOSIS — Z00.00 ROUTINE GENERAL MEDICAL EXAMINATION AT A HEALTH CARE FACILITY: ICD-10-CM

## 2019-07-17 DIAGNOSIS — I10 ESSENTIAL HYPERTENSION: ICD-10-CM

## 2019-07-17 DIAGNOSIS — Z12.39 BREAST SCREENING: ICD-10-CM

## 2019-07-17 LAB
BILIRUB UR QL STRIP: NEGATIVE
CLARITY UR: CLEAR
COLOR UR: YELLOW
GLUCOSE UR QL STRIP: NEGATIVE
HGB UR QL STRIP: ABNORMAL
KETONES UR QL STRIP: NEGATIVE
LEUKOCYTE ESTERASE UR QL STRIP: NEGATIVE
NITRITE UR QL STRIP: NEGATIVE
PH UR STRIP: 6 [PH] (ref 5–8)
PROT UR QL STRIP: NEGATIVE
SP GR UR STRIP: >=1.03 (ref 1–1.03)
URN SPEC COLLECT METH UR: ABNORMAL

## 2019-07-17 PROCEDURE — 3079F DIAST BP 80-89 MM HG: CPT | Mod: CPTII,S$GLB,, | Performed by: OBSTETRICS & GYNECOLOGY

## 2019-07-17 PROCEDURE — 99386 PR PREVENTIVE VISIT,NEW,40-64: ICD-10-PCS | Mod: S$GLB,,, | Performed by: OBSTETRICS & GYNECOLOGY

## 2019-07-17 PROCEDURE — 77063 MAMMO DIGITAL SCREENING BILAT WITH TOMOSYNTHESIS_CAD: ICD-10-PCS | Mod: 26,,, | Performed by: RADIOLOGY

## 2019-07-17 PROCEDURE — 3075F SYST BP GE 130 - 139MM HG: CPT | Mod: CPTII,S$GLB,, | Performed by: OBSTETRICS & GYNECOLOGY

## 2019-07-17 PROCEDURE — 81003 URINALYSIS AUTO W/O SCOPE: CPT

## 2019-07-17 PROCEDURE — 77063 BREAST TOMOSYNTHESIS BI: CPT | Mod: 26,,, | Performed by: RADIOLOGY

## 2019-07-17 PROCEDURE — 77067 SCR MAMMO BI INCL CAD: CPT | Mod: 26,,, | Performed by: RADIOLOGY

## 2019-07-17 PROCEDURE — 77067 MAMMO DIGITAL SCREENING BILAT WITH TOMOSYNTHESIS_CAD: ICD-10-PCS | Mod: 26,,, | Performed by: RADIOLOGY

## 2019-07-17 PROCEDURE — 99999 PR PBB SHADOW E&M-EST. PATIENT-LVL III: ICD-10-PCS | Mod: PBBFAC,,, | Performed by: OBSTETRICS & GYNECOLOGY

## 2019-07-17 PROCEDURE — 99999 PR PBB SHADOW E&M-EST. PATIENT-LVL I: CPT | Mod: PBBFAC,,,

## 2019-07-17 PROCEDURE — 99386 PREV VISIT NEW AGE 40-64: CPT | Mod: S$GLB,,, | Performed by: OBSTETRICS & GYNECOLOGY

## 2019-07-17 PROCEDURE — 88175 CYTOPATH C/V AUTO FLUID REDO: CPT

## 2019-07-17 PROCEDURE — 87624 HPV HI-RISK TYP POOLED RSLT: CPT

## 2019-07-17 PROCEDURE — 99999 PR PBB SHADOW E&M-EST. PATIENT-LVL I: ICD-10-PCS | Mod: PBBFAC,,,

## 2019-07-17 PROCEDURE — 99999 PR PBB SHADOW E&M-EST. PATIENT-LVL III: CPT | Mod: PBBFAC,,, | Performed by: OBSTETRICS & GYNECOLOGY

## 2019-07-17 PROCEDURE — 3075F PR MOST RECENT SYSTOLIC BLOOD PRESS GE 130-139MM HG: ICD-10-PCS | Mod: CPTII,S$GLB,, | Performed by: OBSTETRICS & GYNECOLOGY

## 2019-07-17 PROCEDURE — 3079F PR MOST RECENT DIASTOLIC BLOOD PRESSURE 80-89 MM HG: ICD-10-PCS | Mod: CPTII,S$GLB,, | Performed by: OBSTETRICS & GYNECOLOGY

## 2019-07-17 PROCEDURE — 77067 SCR MAMMO BI INCL CAD: CPT | Mod: TC

## 2019-07-17 NOTE — PROGRESS NOTES
Pt here for BP check.  /82.  P 76. MD notified. Pt advised to continue current regimen and monitor BP trends at home.Pt acknowledged understanding.

## 2019-07-17 NOTE — PATIENT INSTRUCTIONS
Prevention Guidelines, Women Ages 40 to 49  Screening tests and vaccines are an important part of managing your health. Health counseling is essential, too. Below are guidelines for these, for women ages 40 to 49. Talk with your healthcare provider to make sure youre up-to-date on what you need.  Screening Who needs it How often   Type 2 diabetes or prediabetes All adults beginning at age 45 and adults without symptoms at any age who are overweight or obese and have 1 or more additional risk factors for diabetes At least every 3 years   Alcohol misuse All women in this age group At routine exams   Blood pressure All women in this age group Every 2 years if your blood pressure is less than 120/80 mm Hg; yearly if your systolic blood pressure is 120 to 139 mm Hg, or your diastolic blood pressure reading is 80 to 89 mm Hg   Breast cancer All women in this age group Yearly mammogram and clinical breast exam2  Each woman should make her own decision. If a woman decides to have mammograms before age 50, they should be done every 2 years.3   Cervical cancer All women in this age group, except women who have had a complete hysterectomy Pap test every 3 years or Pap test plus human papilloma virus (HPV) test every 5 years   Chlamydia Women at increased risk for infection At routine exams if you're at risk or have symptoms   Depression All women in this age group At routine exams   Gonorrhea Sexually active women at increased risk for infection At routine exams   Hepatitis C Anyone at increased risk; 1 time for those born between 1945 and 1965 At routine exams   High cholesterol or triglycerides All women ages 45 and older who are at risk for coronary artery disease; younger women, talk with your healthcare provider At least every 5 years   HIV All women At routine exams   Obesity All women in this age group At routine exams   Syphilis Women at increased risk for infection - talk with your healthcare provider At routine  exams   Tuberculosis Women at increased risk for infection - talk with your healthcare provider Ask your healthcare provider   Vision All women in this age group Complete exam at age 40 and eye exams every 2 to 4 years. If you have a chronic disease, ask your healthcare provider how often you should have your eyes examined.4   Vaccine Who needs it How often   Chickenpox (varicella) All women in this age group who have no record of this infection or vaccine 2 doses; the second dose should be given at least 4 weeks after the first dose   Hepatitis A Women at increased risk for infection - talk with your healthcare provider 2 doses given 6 months apart   Hepatitis B Women at increased risk for infection - talk with your healthcare provider 3 doses over 6 months; second dose should be given 1 month after the first dose; the third dose should be given at least 2 months after the second dose and at least 4 months after the first dose   Haemophilus influenzae Type B (HIB) Women at increased risk 1 to 3 doses   Influenza (flu) All women in this age group Once a year   Measles, mumps, rubella (MMR) All women in this age group who have no record of these infections or vaccines 1 or 2 doses   Meningococcal Women at increased risk for infection - talk with your healthcare provider 1 or more doses   Pneumococcal conjugate vaccine (PCV13) and pneumococcal polysaccharide vaccine (PPSV23) Women at increased risk for infection - talk with your healthcare provider 1 or 2 doses   Tetanus/diphtheria/pertussis (Td/Tdap) booster All women in this age group A one-time dose of Tdap instead of a Td booster after age 18, then Td every 10 years   Counseling Who needs it How often   BRCA gene mutation testing for breast and ovarian cancer susceptibility Women with increased risk for having gene mutation When your risk is known   Breast cancer and chemoprevention Women at high risk for breast cancer When your risk is known   Diet and exercise  Women who are overweight or obese When diagnosed, and then at routine exams   Domestic violence Women at the age in which they are able to have children At routine exams   Sexually transmitted infection prevention Women at increased risk for infection - talk with your healthcare provider At routine exams   Use of tobacco and the health effects it can cause All women in this age group Every exam   1[facility] Diabetes Association  2American Cancer Society  3U.S. Preventive Services Task Force  4[facility] Academy of Ophthalmology  Date Last Reviewed: 8/27/2015  © 7236-4450 [employer]. [address] [place] [address], [place] 5597167. All rights reserved. This information is not intended as a substitute for professional medical care. Always follow your healthcare professional's instructions.

## 2019-07-17 NOTE — PROGRESS NOTES
"Subjective:      Sarah Brown is a 39 y.o. female who presents for an annual exam. The patient has no complaints today. The patient is sexually active. GYN screening history: last pap: approximate date 5 years and was normal and last mammogram: patient has never had a mammogram. The patient wears seatbelts: yes. The patient participates in regular exercise: yes. Has the patient ever been transfused or tattooed?: no. The patient reports that there is not domestic violence in her life.  Just retired from [employer] department, 15 years of service.  Patient occasionally has skipped periods. Occasional night sweat.  No pain with sexual activity or dryness.  Uses BTL for contraception. Getting mammogram today (first one).  No breast complaints today.  No leakage of urine.  No domestic abuse history.  Already had screen for STD's, does not need them today.     Menstrual History:  OB History        2    Para   2    Term                AB        Living   2       SAB        TAB        Ectopic        Multiple        Live Births                    Menarche age: 13  Patient's last menstrual period was 2019.           Review of Systems  Constitutional: negative  Eyes: negative  Ears, nose, mouth, throat, and face: negative  Respiratory: negative  Cardiovascular: negative  Gastrointestinal: negative  Genitourinary:negative  Integument/breast: negative  Hematologic/lymphatic: negative  Musculoskeletal:negative  Neurological: negative  Behavioral/Psych: negative  Endocrine: negative  Allergic/Immunologic: negative      Objective:      /88   Ht 5' 3" (1.6 m)   Wt 67.5 kg (148 lb 13 oz)   LMP 2019   BMI 26.36 kg/m²   General appearance: alert, appears stated age and cooperative  Head: Normocephalic, without obvious abnormality, atraumatic  Eyes: negative  Nose: no discharge  Neck: no adenopathy, no carotid bruit, no JVD, supple, symmetrical, trachea midline and thyroid not enlarged, symmetric, no " tenderness/mass/nodules  Lungs: clear to auscultation bilaterally  Breasts: Inspection negative, No nipple retraction or dimpling, No nipple discharge or bleeding, No axillary or supraclavicular adenopathy, Normal to palpation without dominant masses  Heart: S1, S2 normal and no S3 or S4  Abdomen: soft, non-tender; bowel sounds normal; no masses,  no organomegaly  Pelvic: cervix normal in appearance, external genitalia normal, no adnexal masses or tenderness, no bladder tenderness, no cervical motion tenderness, perianal skin: no external genital warts noted, rectovaginal septum normal, urethra without abnormality or discharge, uterus normal size, shape, and consistency and vagina normal without discharge  Extremities: extremities normal, atraumatic, no cyanosis or edema  Skin: Skin color, texture, turgor normal. No rashes or lesions  Neurologic: Grossly normal.      Assessment:      Healthy female exam.      Plan:       All questions answered.  Await pap smear results.  Contraception: tubal ligation.  Educational material distributed.  Mammogram.  Thin prep Pap smear.

## 2019-07-22 LAB
HPV HR 12 DNA CVX QL NAA+PROBE: NEGATIVE
HPV16 AG SPEC QL: NEGATIVE
HPV18 DNA SPEC QL NAA+PROBE: NEGATIVE

## 2019-07-23 ENCOUNTER — TELEPHONE (OUTPATIENT)
Dept: RADIOLOGY | Facility: HOSPITAL | Age: 39
End: 2019-07-23

## 2019-08-12 ENCOUNTER — TELEPHONE (OUTPATIENT)
Dept: RADIOLOGY | Facility: HOSPITAL | Age: 39
End: 2019-08-12

## 2019-08-12 NOTE — TELEPHONE ENCOUNTER
Patient no showed her follow up mammogram and ultrasound on right breast 7/24/19, I have left several voice messages on patient cell number with no return call.

## 2019-10-02 DIAGNOSIS — I10 ESSENTIAL HYPERTENSION: ICD-10-CM

## 2019-10-02 RX ORDER — AMLODIPINE BESYLATE 5 MG/1
TABLET ORAL
Qty: 90 TABLET | Refills: 0 | Status: SHIPPED | OUTPATIENT
Start: 2019-10-02 | End: 2019-12-11 | Stop reason: SDUPTHER

## 2019-12-11 ENCOUNTER — OFFICE VISIT (OUTPATIENT)
Dept: INTERNAL MEDICINE | Facility: CLINIC | Age: 39
End: 2019-12-11
Payer: COMMERCIAL

## 2019-12-11 ENCOUNTER — PATIENT MESSAGE (OUTPATIENT)
Dept: ADMINISTRATIVE | Facility: OTHER | Age: 39
End: 2019-12-11

## 2019-12-11 VITALS
BODY MASS INDEX: 27.81 KG/M2 | WEIGHT: 156.94 LBS | RESPIRATION RATE: 18 BRPM | TEMPERATURE: 98 F | SYSTOLIC BLOOD PRESSURE: 162 MMHG | DIASTOLIC BLOOD PRESSURE: 90 MMHG | OXYGEN SATURATION: 99 % | HEART RATE: 88 BPM

## 2019-12-11 DIAGNOSIS — I10 ESSENTIAL HYPERTENSION: Primary | ICD-10-CM

## 2019-12-11 DIAGNOSIS — G43.719 INTRACTABLE CHRONIC MIGRAINE WITHOUT AURA AND WITHOUT STATUS MIGRAINOSUS: ICD-10-CM

## 2019-12-11 DIAGNOSIS — F41.8 ANXIETY ASSOCIATED WITH DEPRESSION: ICD-10-CM

## 2019-12-11 DIAGNOSIS — M47.26 OSTEOARTHRITIS OF SPINE WITH RADICULOPATHY, LUMBAR REGION: ICD-10-CM

## 2019-12-11 PROCEDURE — 90471 FLU VACCINE (QUAD) GREATER THAN OR EQUAL TO 3YO PRESERVATIVE FREE IM: ICD-10-PCS | Mod: S$GLB,,, | Performed by: FAMILY MEDICINE

## 2019-12-11 PROCEDURE — 90686 IIV4 VACC NO PRSV 0.5 ML IM: CPT | Mod: S$GLB,,, | Performed by: FAMILY MEDICINE

## 2019-12-11 PROCEDURE — 99396 PREV VISIT EST AGE 40-64: CPT | Mod: 25,S$GLB,, | Performed by: FAMILY MEDICINE

## 2019-12-11 PROCEDURE — 99999 PR PBB SHADOW E&M-EST. PATIENT-LVL III: CPT | Mod: PBBFAC,,, | Performed by: FAMILY MEDICINE

## 2019-12-11 PROCEDURE — 3077F SYST BP >= 140 MM HG: CPT | Mod: CPTII,S$GLB,, | Performed by: FAMILY MEDICINE

## 2019-12-11 PROCEDURE — 3080F PR MOST RECENT DIASTOLIC BLOOD PRESSURE >= 90 MM HG: ICD-10-PCS | Mod: CPTII,S$GLB,, | Performed by: FAMILY MEDICINE

## 2019-12-11 PROCEDURE — 3080F DIAST BP >= 90 MM HG: CPT | Mod: CPTII,S$GLB,, | Performed by: FAMILY MEDICINE

## 2019-12-11 PROCEDURE — 3077F PR MOST RECENT SYSTOLIC BLOOD PRESSURE >= 140 MM HG: ICD-10-PCS | Mod: CPTII,S$GLB,, | Performed by: FAMILY MEDICINE

## 2019-12-11 PROCEDURE — 90471 IMMUNIZATION ADMIN: CPT | Mod: S$GLB,,, | Performed by: FAMILY MEDICINE

## 2019-12-11 PROCEDURE — 90686 FLU VACCINE (QUAD) GREATER THAN OR EQUAL TO 3YO PRESERVATIVE FREE IM: ICD-10-PCS | Mod: S$GLB,,, | Performed by: FAMILY MEDICINE

## 2019-12-11 PROCEDURE — 99396 PR PREVENTIVE VISIT,EST,40-64: ICD-10-PCS | Mod: 25,S$GLB,, | Performed by: FAMILY MEDICINE

## 2019-12-11 PROCEDURE — 99999 PR PBB SHADOW E&M-EST. PATIENT-LVL III: ICD-10-PCS | Mod: PBBFAC,,, | Performed by: FAMILY MEDICINE

## 2019-12-11 RX ORDER — AMLODIPINE BESYLATE 5 MG/1
5 TABLET ORAL DAILY
Qty: 30 TABLET | Refills: 3 | Status: SHIPPED | OUTPATIENT
Start: 2019-12-11 | End: 2020-03-05 | Stop reason: SDUPTHER

## 2019-12-11 NOTE — PROGRESS NOTES
Subjective:       Patient ID: Sarah Brown is a 39 y.o. female.    Chief Complaint: Medication Refill    39-year-old  female patient with Patient Active Problem List:     Essential hypertension     Migraines     Osteoarthritis of spine with radiculopathy, lumbar region     Anxiety associated with depression  Here for follow-up on blood pressure and requesting refill on her medications, patient has been out of blood pressure medication for the past few weeks.  Denies any headache or vision disturbances chest pain or difficulty breathing, has been followed by psychiatrist outside and has been doing well with anxiety and depression.   Denies any migraine headaches recently  LMP 11/20/2019, regular menstrual cycles    Review of Systems   Constitutional: Negative for fatigue.   Eyes: Negative for visual disturbance.   Respiratory: Negative for shortness of breath.    Cardiovascular: Negative for chest pain and leg swelling.   Gastrointestinal: Negative for abdominal pain, nausea and vomiting.   Musculoskeletal: Negative for myalgias.   Skin: Negative for rash.   Neurological: Negative for light-headedness and headaches.   Psychiatric/Behavioral: Positive for dysphoric mood. Negative for sleep disturbance. The patient is nervous/anxious.          BP (!) 162/90 (BP Location: Right arm, Patient Position: Sitting, BP Method: Small (Manual))   Pulse 88   Temp 97.9 °F (36.6 °C) (Oral)   Resp 18   Wt 71.2 kg (156 lb 15.5 oz)   SpO2 99%   BMI 27.81 kg/m²   Objective:      Physical Exam   Constitutional: She is oriented to person, place, and time. She appears well-developed and well-nourished.   HENT:   Head: Normocephalic and atraumatic.   Mouth/Throat: Oropharynx is clear and moist.   Cardiovascular: Normal rate, regular rhythm and normal heart sounds.   No murmur heard.  Pulmonary/Chest: Effort normal and breath sounds normal. She has no wheezes.   Abdominal: Soft. Bowel sounds are normal. There is no  tenderness.   Musculoskeletal: She exhibits no edema.   Neurological: She is alert and oriented to person, place, and time.   Skin: Skin is warm and dry. No rash noted.   Psychiatric: She has a normal mood and affect.         Assessment/Plan:   1. Essential hypertension  - amLODIPine (NORVASC) 5 MG tablet; Take 1 tablet (5 mg total) by mouth once daily.  Dispense: 30 tablet; Refill: 3  - Hypertension Digital Medicine ([facility]) Enrollment Order  - Hypertension Digital Medicine ([facility]): Assign Onboarding Questionnaires  - Comprehensive metabolic panel; Future  - Lipid panel; Future  - TSH; Future  - Urinalysis; Future  Blood pressure elevated today for not taking her medications, refill will be given on amlodipine 5 mg  Follow-up next week as a nurse visit for blood pressure check  Will enroll in hypertension digital program  Restrict salt intake and eat low-fat and low-cholesterol diet    2. Intractable chronic migraine without aura and without status migrainosus  Stable and asymptomatic at this time taking NSAIDs as needed    3. Anxiety associated with depression  Followed by outside psychiatrist currently on Seroquel 100 mg and Zoloft 100 mg daily    4. Osteoarthritis of spine with radiculopathy, lumbar region  - CBC auto differential; Future  Stable and asymptomatic        Flu shot given today

## 2019-12-12 ENCOUNTER — PATIENT OUTREACH (OUTPATIENT)
Dept: OTHER | Facility: OTHER | Age: 39
End: 2019-12-12

## 2019-12-12 NOTE — LETTER
January 21, 2020     Sarah Brown  [address] [place] [address]       Dear Sarah,    Thank you for your interest in [facility], a clinically-proven program designed to help you manage your chronic condition more conveniently and effectively. [facility] gives you:   Technology that lets you monitor your health at home and send readings directly to your care team at [facility]   Medications managed by a dedicated pharmacist or clinician    A  who calls and helps you take manageable steps towards a healthier lifestyle       We have tried to reach you via phone and [employer] Message to complete your enrollment in the Digital Medicine program. Unfortunately, weve been unable to reach you.     Please call us to complete your enrollment. Our number is [phone]. If we dont hear from you by 2/11/2020, we will be unable to complete your enrollment at this time.     We look forward to hearing from you soon.    Sincerely,     The Digital Medicine Team

## 2019-12-13 ENCOUNTER — OFFICE VISIT (OUTPATIENT)
Dept: INTERNAL MEDICINE | Facility: CLINIC | Age: 39
End: 2019-12-13
Payer: COMMERCIAL

## 2019-12-13 VITALS
SYSTOLIC BLOOD PRESSURE: 120 MMHG | BODY MASS INDEX: 27.62 KG/M2 | DIASTOLIC BLOOD PRESSURE: 86 MMHG | TEMPERATURE: 98 F | HEIGHT: 63 IN | HEART RATE: 76 BPM | WEIGHT: 155.88 LBS | OXYGEN SATURATION: 100 %

## 2019-12-13 DIAGNOSIS — J06.9 VIRAL URI WITH COUGH: Primary | ICD-10-CM

## 2019-12-13 LAB
DEPRECATED S PYO AG THROAT QL EIA: NEGATIVE
INFLUENZA A, MOLECULAR: NEGATIVE
INFLUENZA B, MOLECULAR: NEGATIVE
SPECIMEN SOURCE: NORMAL

## 2019-12-13 PROCEDURE — 3008F PR BODY MASS INDEX (BMI) DOCUMENTED: ICD-10-PCS | Mod: CPTII,S$GLB,, | Performed by: PHYSICIAN ASSISTANT

## 2019-12-13 PROCEDURE — 87081 CULTURE SCREEN ONLY: CPT

## 2019-12-13 PROCEDURE — 99999 PR PBB SHADOW E&M-EST. PATIENT-LVL III: ICD-10-PCS | Mod: PBBFAC,,, | Performed by: PHYSICIAN ASSISTANT

## 2019-12-13 PROCEDURE — 99214 PR OFFICE/OUTPT VISIT, EST, LEVL IV, 30-39 MIN: ICD-10-PCS | Mod: S$GLB,,, | Performed by: PHYSICIAN ASSISTANT

## 2019-12-13 PROCEDURE — 3008F BODY MASS INDEX DOCD: CPT | Mod: CPTII,S$GLB,, | Performed by: PHYSICIAN ASSISTANT

## 2019-12-13 PROCEDURE — 87880 STREP A ASSAY W/OPTIC: CPT

## 2019-12-13 PROCEDURE — 3074F PR MOST RECENT SYSTOLIC BLOOD PRESSURE < 130 MM HG: ICD-10-PCS | Mod: CPTII,S$GLB,, | Performed by: PHYSICIAN ASSISTANT

## 2019-12-13 PROCEDURE — 3079F PR MOST RECENT DIASTOLIC BLOOD PRESSURE 80-89 MM HG: ICD-10-PCS | Mod: CPTII,S$GLB,, | Performed by: PHYSICIAN ASSISTANT

## 2019-12-13 PROCEDURE — 99999 PR PBB SHADOW E&M-EST. PATIENT-LVL III: CPT | Mod: PBBFAC,,, | Performed by: PHYSICIAN ASSISTANT

## 2019-12-13 PROCEDURE — 87502 INFLUENZA DNA AMP PROBE: CPT

## 2019-12-13 PROCEDURE — 3079F DIAST BP 80-89 MM HG: CPT | Mod: CPTII,S$GLB,, | Performed by: PHYSICIAN ASSISTANT

## 2019-12-13 PROCEDURE — 99214 OFFICE O/P EST MOD 30 MIN: CPT | Mod: S$GLB,,, | Performed by: PHYSICIAN ASSISTANT

## 2019-12-13 PROCEDURE — 3074F SYST BP LT 130 MM HG: CPT | Mod: CPTII,S$GLB,, | Performed by: PHYSICIAN ASSISTANT

## 2019-12-13 RX ORDER — PREDNISONE 20 MG/1
TABLET ORAL
Qty: 10 TABLET | Refills: 0 | Status: SHIPPED | OUTPATIENT
Start: 2019-12-13 | End: 2020-07-06

## 2019-12-13 NOTE — PROGRESS NOTES
Subjective:      Patient ID: Sarah Brown is a 39 y.o. female.    Chief Complaint: Sore Throat (2 days) and Generalized Body Aches (2 days)    Sore Throat    This is a new problem. Episode onset: 3 days. Associated symptoms include congestion, coughing, ear pain, headaches, a plugged ear sensation and trouble swallowing. Pertinent negatives include no abdominal pain, diarrhea, drooling, ear discharge, hoarse voice, neck pain, shortness of breath, stridor, swollen glands or vomiting. She has had no exposure to strep or mono. Treatments tried: advil. The treatment provided no relief.       Review of Systems   Constitutional: Positive for activity change and fatigue. Negative for appetite change, chills, diaphoresis, fever and unexpected weight change.   HENT: Positive for congestion, ear pain, postnasal drip, rhinorrhea, sinus pressure, sinus pain, sneezing, sore throat and trouble swallowing. Negative for drooling, ear discharge, hearing loss, hoarse voice and voice change.    Eyes: Negative.  Negative for discharge and visual disturbance.   Respiratory: Positive for cough. Negative for choking, chest tightness, shortness of breath, wheezing and stridor.    Cardiovascular: Negative for chest pain, palpitations and leg swelling.   Gastrointestinal: Negative for abdominal distention, abdominal pain, blood in stool, constipation, diarrhea, nausea and vomiting.   Endocrine: Negative for cold intolerance, heat intolerance, polydipsia and polyuria.   Genitourinary: Negative.  Negative for difficulty urinating, dysuria, frequency, hematuria and menstrual problem.   Musculoskeletal: Positive for arthralgias and myalgias. Negative for back pain, gait problem, joint swelling and neck pain.   Skin: Negative for color change, pallor, rash and wound.   Neurological: Positive for headaches. Negative for dizziness, tremors, weakness, light-headedness and numbness.   Hematological: Negative for adenopathy.  "  Psychiatric/Behavioral: Negative for behavioral problems, confusion, dysphoric mood, self-injury, sleep disturbance and suicidal ideas. The patient is not nervous/anxious.      Objective:   /86 (BP Location: Right arm, Patient Position: Sitting, BP Method: Medium (Manual))   Pulse 76   Temp 98.3 °F (36.8 °C) (Tympanic)   Ht 5' 3" (1.6 m)   Wt 70.7 kg (155 lb 13.8 oz)   SpO2 100%   BMI 27.61 kg/m²     Physical Exam   Constitutional: She is oriented to person, place, and time. She appears well-developed and well-nourished.   HENT:   Head: Normocephalic and atraumatic.   Right Ear: Hearing, tympanic membrane, external ear and ear canal normal. No tenderness.   Left Ear: Hearing, tympanic membrane, external ear and ear canal normal. No tenderness.   Nose: Mucosal edema and rhinorrhea present. No sinus tenderness. Right sinus exhibits maxillary sinus tenderness and frontal sinus tenderness. Left sinus exhibits maxillary sinus tenderness and frontal sinus tenderness.   Mouth/Throat: Uvula is midline and mucous membranes are normal. No oral lesions. Normal dentition. No dental abscesses, uvula swelling or dental caries. Oropharyngeal exudate and posterior oropharyngeal erythema present. No posterior oropharyngeal edema or tonsillar abscesses. No tonsillar exudate.   Eyes: Pupils are equal, round, and reactive to light. Conjunctivae and EOM are normal. Right eye exhibits no discharge. Left eye exhibits no discharge.   Neck: Normal range of motion. Neck supple.   Cardiovascular: Normal rate, regular rhythm and normal heart sounds. Exam reveals no gallop and no friction rub.   No murmur heard.  Pulmonary/Chest: Effort normal and breath sounds normal. No respiratory distress. She has no wheezes. She has no rales.   Lymphadenopathy:     She has no cervical adenopathy.   Neurological: She is alert and oriented to person, place, and time.   Skin: Skin is warm. No rash noted. No erythema. No pallor.   Psychiatric: " She has a normal mood and affect. Her behavior is normal. Judgment and thought content normal.   Nursing note and vitals reviewed.    Office Visit on 12/13/2019   Component Date Value Ref Range Status    Influenza A, Molecular 12/13/2019 Negative  Negative Final    Influenza B, Molecular 12/13/2019 Negative  Negative Final    Flu A & B Source 12/13/2019 NP   Final    Rapid Strep A Screen 12/13/2019 Negative  Negative Final    See Micro for reflexed Strep culture.       Assessment:     1. Viral URI with cough      Plan:   Viral URI with cough  -     Influenza A & B by Molecular  -     Throat Screen, Rapid  -     predniSONE (DELTASONE) 20 MG tablet; Take 2 tablets with food for 3 days; then take one tablet with food for 2 days; then take 1/2 tablet with food for 2 days.  Dispense: 10 tablet; Refill: 0    Other orders  -     Strep A culture, throat    -cont OTC multi symptom relief meds  Educational handout on over-the-counter medications and at-home conservative care, pertinent to the patients diagnosis today, was handed to the patient and discussed in detail.    Follow up if symptoms worsen or fail to improve.

## 2019-12-13 NOTE — PATIENT INSTRUCTIONS
Zyrtec D twice a day  -dayquil/nyquil around the clock  -motrin as needed    Over-the-counter supportive tx for colds and cough:   -start flonase nasal spray (fluticasone) 1 spray each nostril once/day. Continue use for 2-3 weeks.   -Try OTC saline nasal spray a few times a day or nedi-pot using warm filtered water    - refrain from smoking, drink plenty of fluids, hot tea with honey, rest, take medications as prescribed, and use a humidifier or steam in the bathroom.   -Shower in the morning and evening to wash away any allergens and help reduce the production of mucus.   - Zinc lozenge, Emergen-C, or Airborne,  to boost immune system.     -No more than 10 in 24 hours.  -Cepacol sore throat/ cough drops  -Take tylenol or Advil for fever, sore throat, and muscle aches.  -warm salt water gargles or Listerine gargles for sore throat frequently throughout the day.  - Try OTC Mucinex (guafenesin) for cough with thick mucous.   -DM is for dextromethorphan. This is a cough suppressant.   -Phenylephrine/pseudophedrine or anything labeled with a D after it is for congestion.   Avoid decongestants if diagnosed with hypertension or arrhythmias. To avoid  rebound congestion, refrain from taking decongestant for longer than 5 day.    -For prevention,extremely important to quit smoking, get annual influenza vaccines, reduce exposure to air pollution, and to frequently wash hands to avoid spread of infection.

## 2019-12-13 NOTE — LETTER
December 13, 2019      [facility]  [address]  [place] [address]  Phone: [phone]  Fax: [phone]       Patient: Sarah Brown   YOB: 1980  Date of Visit: 12/13/2019    To Whom It May Concern:    Jennifer Brown  was at [facility] on 12/13/2019. She may return to work/school on 12/15/2019 with no restrictions. If you have any questions or concerns, or if I can be of further assistance, please do not hesitate to contact me.    Sincerely,          Soo Galvez PA-C

## 2019-12-16 ENCOUNTER — PATIENT OUTREACH (OUTPATIENT)
Dept: OTHER | Facility: OTHER | Age: 39
End: 2019-12-16

## 2019-12-16 LAB — BACTERIA THROAT CULT: NORMAL

## 2020-01-30 ENCOUNTER — OFFICE VISIT (OUTPATIENT)
Dept: OBSTETRICS AND GYNECOLOGY | Facility: CLINIC | Age: 40
End: 2020-01-30
Payer: COMMERCIAL

## 2020-01-30 VITALS
HEIGHT: 63 IN | WEIGHT: 157.63 LBS | BODY MASS INDEX: 27.93 KG/M2 | SYSTOLIC BLOOD PRESSURE: 134 MMHG | OXYGEN SATURATION: 99 % | DIASTOLIC BLOOD PRESSURE: 86 MMHG | HEART RATE: 74 BPM

## 2020-01-30 DIAGNOSIS — N93.9 ABNORMAL UTERINE BLEEDING: Primary | ICD-10-CM

## 2020-01-30 PROCEDURE — 3075F SYST BP GE 130 - 139MM HG: CPT | Mod: CPTII,S$GLB,, | Performed by: OBSTETRICS & GYNECOLOGY

## 2020-01-30 PROCEDURE — 3079F DIAST BP 80-89 MM HG: CPT | Mod: CPTII,S$GLB,, | Performed by: OBSTETRICS & GYNECOLOGY

## 2020-01-30 PROCEDURE — 3008F BODY MASS INDEX DOCD: CPT | Mod: CPTII,S$GLB,, | Performed by: OBSTETRICS & GYNECOLOGY

## 2020-01-30 PROCEDURE — 99999 PR PBB SHADOW E&M-EST. PATIENT-LVL III: ICD-10-PCS | Mod: PBBFAC,,, | Performed by: OBSTETRICS & GYNECOLOGY

## 2020-01-30 PROCEDURE — 99999 PR PBB SHADOW E&M-EST. PATIENT-LVL III: CPT | Mod: PBBFAC,,, | Performed by: OBSTETRICS & GYNECOLOGY

## 2020-01-30 PROCEDURE — 87491 CHLMYD TRACH DNA AMP PROBE: CPT

## 2020-01-30 PROCEDURE — 3075F PR MOST RECENT SYSTOLIC BLOOD PRESS GE 130-139MM HG: ICD-10-PCS | Mod: CPTII,S$GLB,, | Performed by: OBSTETRICS & GYNECOLOGY

## 2020-01-30 PROCEDURE — 3079F PR MOST RECENT DIASTOLIC BLOOD PRESSURE 80-89 MM HG: ICD-10-PCS | Mod: CPTII,S$GLB,, | Performed by: OBSTETRICS & GYNECOLOGY

## 2020-01-30 PROCEDURE — 99213 PR OFFICE/OUTPT VISIT, EST, LEVL III, 20-29 MIN: ICD-10-PCS | Mod: S$GLB,,, | Performed by: OBSTETRICS & GYNECOLOGY

## 2020-01-30 PROCEDURE — 99213 OFFICE O/P EST LOW 20 MIN: CPT | Mod: S$GLB,,, | Performed by: OBSTETRICS & GYNECOLOGY

## 2020-01-30 PROCEDURE — 3008F PR BODY MASS INDEX (BMI) DOCUMENTED: ICD-10-PCS | Mod: CPTII,S$GLB,, | Performed by: OBSTETRICS & GYNECOLOGY

## 2020-01-31 NOTE — PROGRESS NOTES
Subjective:       Patient ID: Sarah Brown is a 39 y.o. female.    Chief Complaint:  Vaginal Bleeding      History of Present Illness  HPI  Presents with a one time episode of abnormal vaginal bleeding.  Pt typically has regular, monthly periods.  In December, she had bilateral breast swelling and tenderness just prior to her period, but her period came on time.  2 weeks later, she had 1 day episode of heavy vaginal bleeding and pelvic pain.  It happened again a few days later, but resolved quickly.  Pain and bleeding are no longer present.  She is mutually monogamous with a male partner.  She has had a BTL, but is contemplating tubal reversal surgery.  No vaginal discharge or odor.  Feels well today.    GYN & OB History  No LMP recorded.   Date of Last Pap: 2019    OB History    Para Term  AB Living   2 2 2     2   SAB TAB Ectopic Multiple Live Births           2      # Outcome Date GA Lbr Percy/2nd Weight Sex Delivery Anes PTL Lv   2 Term      Vag-Spont   CELESTINA   1 Term      Vag-Spont   CELESTINA       Review of Systems  Review of Systems   Constitutional: Negative for fatigue, fever and unexpected weight change.   Gastrointestinal: Negative for abdominal pain, constipation, diarrhea, nausea and vomiting.   Genitourinary: Positive for menstrual problem and pelvic pain. Negative for dysuria, frequency, urgency, vaginal bleeding, vaginal discharge, vaginal pain, postcoital bleeding and vaginal odor.           Objective:    Physical Exam:   Constitutional: She is oriented to person, place, and time. She appears well-developed and well-nourished. No distress.             Abdominal: Soft. She exhibits no distension and no mass. There is no tenderness. There is no rebound and no guarding. Hernia confirmed negative in the right inguinal area and confirmed negative in the left inguinal area.     Genitourinary: Vagina normal. Pelvic exam was performed with patient supine. There is no rash, tenderness, lesion or  injury on the right labia. There is no rash, tenderness, lesion or injury on the left labia. Uterus is not deviated, not enlarged, not fixed, not tender and not experiencing uterine prolapse. Right adnexum displays no mass, no tenderness and no fullness. Left adnexum displays no mass, no tenderness and no fullness. No erythema, tenderness, bleeding, rectocele, cystocele or unspecified prolapse of vaginal walls in the vagina. No foreign body in the vagina. No signs of injury around the vagina. No vaginal discharge found. Cervix exhibits no motion tenderness, no discharge and no friability.        Uterus Size: 6 cm       Neurological: She is alert and oriented to person, place, and time.     Psychiatric: She has a normal mood and affect.        UPT: negative  Assessment:        1. Abnormal uterine bleeding                Plan:      Sarah was seen today for vaginal bleeding.    Diagnoses and all orders for this visit:    Abnormal uterine bleeding  -     US Pelvis Complete Non OB; Future  -     C. trachomatis/N. gonorrhoeae by AMP DNA    If above studies are normal, and if no further bleeding episodes, then no additional work-up needed at this time since this was an isolated event.  Advised she will need an EMB if endometrium appears abnormally thickened or if bleeding recurs. Will notify pt of results.

## 2020-02-03 LAB
C TRACH DNA SPEC QL NAA+PROBE: NOT DETECTED
N GONORRHOEA DNA SPEC QL NAA+PROBE: NOT DETECTED

## 2020-02-11 ENCOUNTER — TELEPHONE (OUTPATIENT)
Dept: RADIOLOGY | Facility: HOSPITAL | Age: 40
End: 2020-02-11

## 2020-02-19 NOTE — PROGRESS NOTES
Patient was discharged from the [facility] due to non-compliance and incomplete enrollment. Patient was sent out incomplete enrollment on 1/21/20. Patient has not responded to any outreaches.

## 2020-03-05 DIAGNOSIS — I10 ESSENTIAL HYPERTENSION: ICD-10-CM

## 2020-03-05 RX ORDER — AMLODIPINE BESYLATE 5 MG/1
5 TABLET ORAL DAILY
Qty: 90 TABLET | Refills: 1 | Status: SHIPPED | OUTPATIENT
Start: 2020-03-05 | End: 2020-07-06 | Stop reason: DRUGHIGH

## 2020-07-06 ENCOUNTER — OFFICE VISIT (OUTPATIENT)
Dept: INTERNAL MEDICINE | Facility: CLINIC | Age: 40
End: 2020-07-06
Payer: COMMERCIAL

## 2020-07-06 DIAGNOSIS — Z29.9 PREVENTIVE MEASURE: ICD-10-CM

## 2020-07-06 DIAGNOSIS — G43.719 INTRACTABLE CHRONIC MIGRAINE WITHOUT AURA AND WITHOUT STATUS MIGRAINOSUS: ICD-10-CM

## 2020-07-06 DIAGNOSIS — I10 ESSENTIAL HYPERTENSION: Primary | ICD-10-CM

## 2020-07-06 DIAGNOSIS — F41.8 ANXIETY ASSOCIATED WITH DEPRESSION: ICD-10-CM

## 2020-07-06 PROCEDURE — 99214 OFFICE O/P EST MOD 30 MIN: CPT | Mod: 95,,, | Performed by: FAMILY MEDICINE

## 2020-07-06 PROCEDURE — 99214 PR OFFICE/OUTPT VISIT, EST, LEVL IV, 30-39 MIN: ICD-10-PCS | Mod: 95,,, | Performed by: FAMILY MEDICINE

## 2020-07-06 RX ORDER — AMLODIPINE BESYLATE 10 MG/1
10 TABLET ORAL DAILY
Qty: 30 TABLET | Refills: 1 | Status: SHIPPED | OUTPATIENT
Start: 2020-07-06 | End: 2020-08-05

## 2020-07-06 NOTE — PROGRESS NOTES
Subjective:       Patient ID: Sarah Brown is a 40 y.o. female.    Chief Complaint: No chief complaint on file.    The patient location is:  Work  The chief complaint leading to consultation is:  Follow-up    Visit type: audiovisual    Face to Face time with patient: 15 minutes of total time spent on the encounter, which includes face to face time and non-face to face time preparing to see the patient (eg, review of tests), Obtaining and/or reviewing separately obtained history, Documenting clinical information in the electronic or other health record, Independently interpreting results (not separately reported) and communicating results to the patient/family/caregiver, or Care coordination (not separately reported).         Each patient to whom he or she provides medical services by telemedicine is:  (1) informed of the relationship between the physician and patient and the respective role of any other health care provider with respect to management of the patient; and (2) notified that he or she may decline to receive medical services by telemedicine and may withdraw from such care at any time.    Notes:     40-year-old  female patient with Patient Active Problem List:     Essential hypertension     Migraines     Osteoarthritis of spine with radiculopathy, lumbar region     Anxiety associated with depression  Here for follow-up on chronic medical conditions and reports that she has been having headaches off and on lately.   Patient has discontinued anxiety and depression medication as she ran out of the medicine, occasionally has been getting anxious and taking her blood pressure medication regularly  Reports that her blood pressures have been running in the range of 140s over 90s, occasionally 160s over 90s.   Denies any chest pain or difficulty breathing or palpitations, nausea vomiting    Hypertension  This is a recurrent problem. The current episode started more than 1 year ago. The problem is  unchanged. The problem is resistant. Associated symptoms include anxiety, headaches and sweats. Pertinent negatives include no neck pain. Agents associated with hypertension include NSAIDs. There are no known risk factors for coronary artery disease. There are no compliance problems.      Review of Systems   Musculoskeletal: Negative for neck pain.   Neurological: Positive for headaches.   Psychiatric/Behavioral: The patient is nervous/anxious.          There were no vitals taken for this visit.  Objective:      Physical Exam  Constitutional:       Appearance: She is well-developed.   Pulmonary:      Effort: No respiratory distress.   Neurological:      Mental Status: She is alert and oriented to person, place, and time.           Assessment/Plan:   1. Essential hypertension  - Hypertension Digital Medicine (HDMP) Enrollment Order  - Hypertension Digital Medicine (HDMP): Assign Onboarding Questionnaires  - Comprehensive metabolic panel; Future  - Lipid Panel; Future  - TSH; Future  - Urinalysis; Future  Blood pressure mildly elevated today reported 146/93  Will increase amlodipine from 5-10 mg daily and advised to get started with hypertension digital program  Follow-up in 2 weeks  Restrict salt intake and eat low-fat and low-cholesterol diet    2. Intractable chronic migraine without aura and without status migrainosus  Could be secondary to uncontrolled blood pressure  Encouraged to avoid caffeine and carbonated beverages    3. Anxiety associated with depression  Patient was advised to discuss further with her psychiatrist regarding medications refill    4. Preventive measure  - CBC auto differential; Future  - Comprehensive metabolic panel; Future  - Lipid Panel; Future  - TSH; Future  - Urinalysis; Future  - HIV 1/2 Ag/Ab (4th Gen); Future  Will check fasting labs and follow-up with me in 2 weeks

## 2020-07-15 ENCOUNTER — LAB VISIT (OUTPATIENT)
Dept: LAB | Facility: HOSPITAL | Age: 40
End: 2020-07-15
Attending: PSYCHIATRY & NEUROLOGY
Payer: COMMERCIAL

## 2020-07-15 DIAGNOSIS — Z29.9 PREVENTIVE MEASURE: ICD-10-CM

## 2020-07-15 DIAGNOSIS — I10 ESSENTIAL HYPERTENSION: ICD-10-CM

## 2020-07-15 LAB
BILIRUB UR QL STRIP: NEGATIVE
CLARITY UR REFRACT.AUTO: CLEAR
COLOR UR AUTO: NORMAL
GLUCOSE UR QL STRIP: NEGATIVE
HGB UR QL STRIP: NEGATIVE
KETONES UR QL STRIP: NEGATIVE
LEUKOCYTE ESTERASE UR QL STRIP: NEGATIVE
NITRITE UR QL STRIP: NEGATIVE
PH UR STRIP: 8 [PH] (ref 5–8)
PROT UR QL STRIP: NEGATIVE
SP GR UR STRIP: 1 (ref 1–1.03)
URN SPEC COLLECT METH UR: NORMAL

## 2020-07-15 PROCEDURE — 81003 URINALYSIS AUTO W/O SCOPE: CPT

## 2020-07-22 ENCOUNTER — HOSPITAL ENCOUNTER (OUTPATIENT)
Dept: CARDIOLOGY | Facility: HOSPITAL | Age: 40
Discharge: HOME | End: 2020-07-22
Payer: COMMERCIAL

## 2020-07-22 ENCOUNTER — OFFICE VISIT (OUTPATIENT)
Dept: INTERNAL MEDICINE | Facility: CLINIC | Age: 40
End: 2020-07-22
Payer: COMMERCIAL

## 2020-07-22 VITALS
HEIGHT: 63 IN | BODY MASS INDEX: 28 KG/M2 | OXYGEN SATURATION: 99 % | WEIGHT: 158.06 LBS | HEART RATE: 89 BPM | DIASTOLIC BLOOD PRESSURE: 92 MMHG | RESPIRATION RATE: 18 BRPM | SYSTOLIC BLOOD PRESSURE: 134 MMHG

## 2020-07-22 DIAGNOSIS — I10 ESSENTIAL HYPERTENSION: ICD-10-CM

## 2020-07-22 DIAGNOSIS — G43.719 INTRACTABLE CHRONIC MIGRAINE WITHOUT AURA AND WITHOUT STATUS MIGRAINOSUS: ICD-10-CM

## 2020-07-22 DIAGNOSIS — M47.26 OSTEOARTHRITIS OF SPINE WITH RADICULOPATHY, LUMBAR REGION: ICD-10-CM

## 2020-07-22 DIAGNOSIS — F41.8 ANXIETY ASSOCIATED WITH DEPRESSION: ICD-10-CM

## 2020-07-22 DIAGNOSIS — K59.04 CHRONIC IDIOPATHIC CONSTIPATION: ICD-10-CM

## 2020-07-22 DIAGNOSIS — Z00.00 ROUTINE GENERAL MEDICAL EXAMINATION AT A HEALTH CARE FACILITY: Primary | ICD-10-CM

## 2020-07-22 PROCEDURE — 99999 PR PBB SHADOW E&M-EST. PATIENT-LVL III: CPT | Mod: PBBFAC,,, | Performed by: FAMILY MEDICINE

## 2020-07-22 PROCEDURE — 3008F BODY MASS INDEX DOCD: CPT | Mod: CPTII,S$GLB,, | Performed by: FAMILY MEDICINE

## 2020-07-22 PROCEDURE — 93005 ELECTROCARDIOGRAM TRACING: CPT

## 2020-07-22 PROCEDURE — 3075F PR MOST RECENT SYSTOLIC BLOOD PRESS GE 130-139MM HG: ICD-10-PCS | Mod: CPTII,S$GLB,, | Performed by: FAMILY MEDICINE

## 2020-07-22 PROCEDURE — 93010 EKG 12-LEAD: ICD-10-PCS | Mod: ,,, | Performed by: INTERNAL MEDICINE

## 2020-07-22 PROCEDURE — 93010 ELECTROCARDIOGRAM REPORT: CPT | Mod: ,,, | Performed by: INTERNAL MEDICINE

## 2020-07-22 PROCEDURE — 3008F PR BODY MASS INDEX (BMI) DOCUMENTED: ICD-10-PCS | Mod: CPTII,S$GLB,, | Performed by: FAMILY MEDICINE

## 2020-07-22 PROCEDURE — 99396 PREV VISIT EST AGE 40-64: CPT | Mod: S$GLB,,, | Performed by: FAMILY MEDICINE

## 2020-07-22 PROCEDURE — 99396 PR PREVENTIVE VISIT,EST,40-64: ICD-10-PCS | Mod: S$GLB,,, | Performed by: FAMILY MEDICINE

## 2020-07-22 PROCEDURE — 3080F PR MOST RECENT DIASTOLIC BLOOD PRESSURE >= 90 MM HG: ICD-10-PCS | Mod: CPTII,S$GLB,, | Performed by: FAMILY MEDICINE

## 2020-07-22 PROCEDURE — 3080F DIAST BP >= 90 MM HG: CPT | Mod: CPTII,S$GLB,, | Performed by: FAMILY MEDICINE

## 2020-07-22 PROCEDURE — 99999 PR PBB SHADOW E&M-EST. PATIENT-LVL III: ICD-10-PCS | Mod: PBBFAC,,, | Performed by: FAMILY MEDICINE

## 2020-07-22 PROCEDURE — 3075F SYST BP GE 130 - 139MM HG: CPT | Mod: CPTII,S$GLB,, | Performed by: FAMILY MEDICINE

## 2020-07-22 RX ORDER — HYDROCHLOROTHIAZIDE 12.5 MG/1
12.5 TABLET ORAL DAILY
Qty: 30 TABLET | Refills: 1 | Status: SHIPPED | OUTPATIENT
Start: 2020-07-22 | End: 2020-08-07

## 2020-07-22 NOTE — PROGRESS NOTES
"Subjective:       Patient ID: Sarah Brown is a 40 y.o. female.    Chief Complaint: Follow-up    40-year-old  female patient with Patient Active Problem List:     Essential hypertension     Migraines     Osteoarthritis of spine with radiculopathy, lumbar region     Anxiety associated with depression     Chronic idiopathic constipation  Here for routine annual physicals and follow-up on blood pressure.  Patient started taking increased dose of amlodipine 10 mg daily, denies any anxiety depression.  Denies any headache vision disturbances, chest pain palpitations or leg swelling.  Migraines have been stable  Reports having constipation for which she has to take any laxative to help her move her bowels occasionally once every week, denies any blood in the stool    Review of Systems   Constitutional: Negative for activity change and fatigue.   HENT: Negative for hearing loss, rhinorrhea and trouble swallowing.    Eyes: Negative for discharge and visual disturbance.   Respiratory: Negative for chest tightness, shortness of breath and wheezing.    Cardiovascular: Negative for chest pain, palpitations and leg swelling.   Gastrointestinal: Positive for constipation. Negative for abdominal pain, blood in stool, diarrhea, nausea and vomiting.   Genitourinary: Negative for difficulty urinating and hematuria.   Musculoskeletal: Negative for myalgias.   Skin: Negative for rash.   Neurological: Negative for light-headedness and headaches.   Psychiatric/Behavioral: Negative for decreased concentration, dysphoric mood and sleep disturbance. The patient is not nervous/anxious.          BP (!) 134/92 (BP Location: Right arm, Patient Position: Sitting, BP Method: Medium (Manual))   Pulse 89   Resp 18   Ht 5' 3" (1.6 m)   Wt 71.7 kg (158 lb 1.1 oz)   LMP 07/01/2020 (Within Days)   SpO2 99%   BMI 28.00 kg/m²   Objective:      Physical Exam  Constitutional:       Appearance: She is well-developed.   HENT:      " Head: Normocephalic and atraumatic.   Cardiovascular:      Rate and Rhythm: Normal rate and regular rhythm.      Heart sounds: Normal heart sounds. No murmur.   Pulmonary:      Effort: Pulmonary effort is normal.      Breath sounds: Normal breath sounds. No wheezing.   Abdominal:      General: Bowel sounds are normal.      Palpations: Abdomen is soft.      Tenderness: There is no abdominal tenderness.   Skin:     General: Skin is warm and dry.      Findings: No rash.   Neurological:      Mental Status: She is alert and oriented to person, place, and time.         Lab Visit on 07/15/2020   Component Date Value Ref Range Status    Specimen UA 07/15/2020 Urine, Clean Catch   Final    Color, UA 07/15/2020 Straw  Yellow, Straw, Nirmala Final    Appearance, UA 07/15/2020 Clear  Clear Final    pH, UA 07/15/2020 8.0  5.0 - 8.0 Final    Specific [place], UA 07/15/2020 1.005  1.005 - 1.030 Final    Protein, UA 07/15/2020 Negative  Negative Final    Comment: Recommend a 24 hour urine protein or a urine   protein/creatinine ratio if globulin induced proteinuria is  clinically suspected.      Glucose, UA 07/15/2020 Negative  Negative Final    Ketones, UA 07/15/2020 Negative  Negative Final    Bilirubin (UA) 07/15/2020 Negative  Negative Final    Occult Blood UA 07/15/2020 Negative  Negative Final    Nitrite, UA 07/15/2020 Negative  Negative Final    Leukocytes, UA 07/15/2020 Negative  Negative Final   Lab Visit on 07/15/2020   Component Date Value Ref Range Status    WBC 07/15/2020 3.94  3.90 - 12.70 K/uL Final    RBC 07/15/2020 4.26  4.00 - 5.40 M/uL Final    Hemoglobin 07/15/2020 11.8* 12.0 - 16.0 g/dL Final    Hematocrit 07/15/2020 39.9  37.0 - 48.5 % Final    Mean Corpuscular Volume 07/15/2020 94  82 - 98 fL Final    Mean Corpuscular Hemoglobin 07/15/2020 27.7  27.0 - 31.0 pg Final    Mean Corpuscular Hemoglobin Conc 07/15/2020 29.6* 32.0 - 36.0 g/dL Final    RDW 07/15/2020 12.8  11.5 - 14.5 % Final     Platelets 07/15/2020 271  150 - 350 K/uL Final    MPV 07/15/2020 10.6  9.2 - 12.9 fL Final    Immature Granulocytes 07/15/2020 0.3  0.0 - 0.5 % Final    Gran # (ANC) 07/15/2020 2.1  1.8 - 7.7 K/uL Final    Immature Grans (Abs) 07/15/2020 0.01  0.00 - 0.04 K/uL Final    Comment: Mild elevation in immature granulocytes is non specific and   can be seen in a variety of conditions including stress response,   acute inflammation, trauma and pregnancy. Correlation with other   laboratory and clinical findings is essential.      Lymph # 07/15/2020 1.4  1.0 - 4.8 K/uL Final    Mono # 07/15/2020 0.3  0.3 - 1.0 K/uL Final    Eos # 07/15/2020 0.1  0.0 - 0.5 K/uL Final    Baso # 07/15/2020 0.03  0.00 - 0.20 K/uL Final    nRBC 07/15/2020 0  0 /100 WBC Final    Gran% 07/15/2020 52.3  38.0 - 73.0 % Final    Lymph% 07/15/2020 35.5  18.0 - 48.0 % Final    Mono% 07/15/2020 8.1  4.0 - 15.0 % Final    Eosinophil% 07/15/2020 3.0  0.0 - 8.0 % Final    Basophil% 07/15/2020 0.8  0.0 - 1.9 % Final    Differential Method 07/15/2020 Automated   Final    Sodium 07/15/2020 138  136 - 145 mmol/L Final    Potassium 07/15/2020 3.5  3.5 - 5.1 mmol/L Final    Chloride 07/15/2020 104  95 - 110 mmol/L Final    CO2 07/15/2020 27  23 - 29 mmol/L Final    Glucose 07/15/2020 104  70 - 110 mg/dL Final    BUN, Bld 07/15/2020 7  6 - 20 mg/dL Final    Creatinine 07/15/2020 0.8  0.5 - 1.4 mg/dL Final    Calcium 07/15/2020 9.5  8.7 - 10.5 mg/dL Final    Total Protein 07/15/2020 7.6  6.0 - 8.4 g/dL Final    Albumin 07/15/2020 4.0  3.5 - 5.2 g/dL Final    Total Bilirubin 07/15/2020 0.5  0.1 - 1.0 mg/dL Final    Comment: For infants and newborns, interpretation of results should be based  on gestational age, weight and in agreement with clinical  observations.  Premature Infant recommended reference ranges:  Up to 24 hours.............<8.0 mg/dL  Up to 48 hours............<12.0 mg/dL  3-5 days..................<15.0 mg/dL  6-29  days.................<15.0 mg/dL      Alkaline Phosphatase 07/15/2020 63  55 - 135 U/L Final    AST 07/15/2020 15  10 - 40 U/L Final    ALT 07/15/2020 13  10 - 44 U/L Final    Anion Gap 07/15/2020 7* 8 - 16 mmol/L Final    eGFR if African American 07/15/2020 >60.0  >60 mL/min/1.73 m^2 Final    eGFR if non African American 07/15/2020 >60.0  >60 mL/min/1.73 m^2 Final    Comment: Calculation used to obtain the estimated glomerular filtration  rate (eGFR) is the CKD-EPI equation.       Cholesterol 07/15/2020 178  120 - 199 mg/dL Final    Comment: The National Cholesterol Education Program (NCEP) has set the  following guidelines (reference ranges) for Cholesterol:  Optimal.....................<200 mg/dL  Borderline High.............200-239 mg/dL  High........................> or = 240 mg/dL      Triglycerides 07/15/2020 61  30 - 150 mg/dL Final    Comment: The National Cholesterol Education Program (NCEP) has set the  following guidelines (reference values) for triglycerides:  Normal......................<150 mg/dL  Borderline High.............150-199 mg/dL  High........................200-499 mg/dL      HDL 07/15/2020 65  40 - 75 mg/dL Final    Comment: The National Cholesterol Education Program (NCEP) has set the  following guidelines (reference values) for HDL Cholesterol:  Low...............<40 mg/dL  Optimal...........>60 mg/dL      LDL Cholesterol 07/15/2020 100.8  63.0 - 159.0 mg/dL Final    Comment: The National Cholesterol Education Program (NCEP) has set the  following guidelines (reference values) for LDL Cholesterol:  Optimal.......................<130 mg/dL  Borderline High...............130-159 mg/dL  High..........................160-189 mg/dL  Very High.....................>190 mg/dL      Hdl/Cholesterol Ratio 07/15/2020 36.5  20.0 - 50.0 % Final    Total Cholesterol/HDL Ratio 07/15/2020 2.7  2.0 - 5.0 Final    Non-HDL Cholesterol 07/15/2020 113  mg/dL Final    Comment: Risk category and  Non-HDL cholesterol goals:  Coronary heart disease (CHD)or equivalent (10-year risk of CHD >20%):  Non-HDL cholesterol goal     <130 mg/dL  Two or more CHD risk factors and 10-year risk of CHD <= 20%:  Non-HDL cholesterol goal     <160 mg/dL  0 to 1 CHD risk factor:  Non-HDL cholesterol goal     <190 mg/dL      TSH 07/15/2020 1.342  0.400 - 4.000 uIU/mL Final    HIV 1/2 Ag/Ab 07/15/2020 Negative  Negative Final       Assessment/Plan:   1. Routine general medical examination at a health care facility  Vital signs stable today except mildly elevated blood pressure.  Patient clinically stable.   Reviewed recent labs which looks stable  Encouraged to work on lifestyle changes with diet and exercise    2. Essential hypertension  - Hypertension Digital Medicine (HDMP) Enrollment Order  - EKG 12-lead; Future  - hydroCHLOROthiazide (HYDRODIURIL) 12.5 MG Tab; Take 1 tablet (12.5 mg total) by mouth once daily.  Dispense: 30 tablet; Refill: 1  Noted mild elevation blood pressure in spite of increasing amlodipine from 5-10 mg daily  Will start on hydrochlorothiazide 12.5 mg, patient will be enrolled in hypertension digital program  Encouraged to monitor blood pressure trends and restrict salt intake, will get baseline EKG  If blood pressures still remains elevated beyond 130/90 patient to let us know    3. Osteoarthritis of spine with radiculopathy, lumbar region  Stable and asymptomatic    4. Intractable chronic migraine without aura and without status migrainosus  Asymptomatic    5. Anxiety associated with depression  Currently not taking any medication and has been resolved    6. Chronic idiopathic constipation  Encouraged to take over-the-counter Metamucil daily and if no response start taking  MiraLax  Has been drinking 64 oz of water daily

## 2020-07-23 ENCOUNTER — PATIENT OUTREACH (OUTPATIENT)
Dept: OTHER | Facility: OTHER | Age: 40
End: 2020-07-23

## 2020-07-23 NOTE — LETTER
July 23, 2020     Sarah Brown  [address] [place] [address]       Dear Sarah,    Welcome to [facility]! Our goal is to make care effective, proactive and convenient by using data you send us from home to better treat your chronic conditions.              My name is Tania Barry, and I am your dedicated Digital Medicine clinician. As an expert in medication management, I will help ensure that the medications you are taking continue to provide the intended benefits and help you reach your goals. You can reach me directly at [phone] or by sending me a message directly through your [facility] account.      I am Va Wilson and I will be your health . My job is to help you identify lifestyle changes to improve your disease control. We will talk about nutrition, exercise, and other ways you may be able to adjust your current habits to better your health. Additionally, we will help ensure you are completing the tests and screenings that are necessary to help manage your conditions. You can reach me directly at [phone] or by sending me a message directly through your [facility] account.    Most importantly, YOU are at the center of this team. Together, we will work to improve your overall health and encourage you to meet your goals for a healthier lifestyle.     What we expect from YOU:  · Please take frequent home blood pressure measurements. We ask that you take at least 1 blood pressure reading per week, but more information will better help us get you know you. Be sure you rest for a few minutes before taking the reading in a quiet, comfortable place.     Be available to receive phone calls or [employer]t messages, when appropriate, from your care team. Please let us know if there are any specific days or times that work best for us to reach you via phone.     Complete routine tests and screenings. Dont worry, we will help keep you on track!           What you  should expect from your Digital Medicine Care Team:   We will work with you to create a personalized plan of care and provide you with encouragement and education, including regarding lifestyle changes, that could help you manage your disease states.     We will adjust your current medications, if needed, and continue to monitor your long-term progress.     We will provide you and your physician with monthly progress reports after you have been in the program for more than 30 days.     We will send you reminders through [employer] and text messages to help ensure you do not miss any testing deadlines to help manage your disease states.    You will be able to reach us by phone or through your [facility] account by clicking our names under Care Team on the right side of the home screen.    I look forward to working with you to achieve your blood pressure goals!    We look forward to working with you to help manage your health,    Sincerely,    Your Digital Medicine Team    Please visit our websites to learn more:   · Hypertension: [email or web address]      Remember, we are not available for emergencies. If you have an emergency, please contact your doctors office directly or call [facility] ([phone] or [phone]) or [phone].

## 2020-07-23 NOTE — PROGRESS NOTES
Digital Medicine: Health  Introduction    Introduced Sarah Brown to Digital Medicine. Discussed health  role and recommended lifestyle modifications.    The history is provided by the patient.               HYPERTENSION  Explained hypertension digital medicine goals including BP goal less than or equal to 130/80mmHg, improved convenience of BP management and reduced risk of heart attack, kidney failure, stroke, eye disease, dementia, and death.      Explained non-pharmacologic therapies like low salt diet and physical activity can reduce blood pressure. .      Explained that we expect patient to submit several blood pressure readings per week at random times of the day, but at least 30 minutes after taking blood pressure medications. Instructed patient not to allow anyone else to use their blood pressure monitor and phone as data submitted is directly entered into medical record. Reviewed and confirmed appropriate blood pressure monitoring technique.         Patient's BP goal is less than or equal to 130/80.Patient's BP average is 143/91 mmHg, which is above goal, per 2017 ACC/AHA Hypertension Guidelines.        Lifestyle  PLAN  Additional monitoring needed:  Instructed to charge device:  Provided patient education:  Reviewed Device Techniques  Patient verbalizes understanding.         There are no preventive care reminders to display for this patient.    Last 5 Patient Entered Readings                                      Current 30 Day Average: 143/91     Recent Readings 7/23/2020 7/12/2020 7/10/2020 7/10/2020 7/9/2020    SBP (mmHg) 146 169 120 145 141    DBP (mmHg) 91 103 82 90 90    Pulse 76 60 78 71 62

## 2020-07-24 ENCOUNTER — PATIENT OUTREACH (OUTPATIENT)
Dept: OTHER | Facility: OTHER | Age: 40
End: 2020-07-24

## 2020-07-24 DIAGNOSIS — I10 ESSENTIAL HYPERTENSION: Primary | ICD-10-CM

## 2020-07-24 NOTE — PROGRESS NOTES
Digital Medicine: Clinician Introduction    Sarah Brown is a 40 y.o. female who is newly enrolled in the Digital Medicine [facility].    Called patient regarding HDMP (Hypertension Digital Medicine Program)    HPI  Patients BP average is currently 143/91 mmHg.    Patient denies s/s of hypotension (lightheadedness, dizziness, nausea, fatigue) associated with low readings. Instructed patient to inform me if this occurs, patient confirms understanding.    Patient denies s/s of hypertension (SOB, CP, severe headaches, changes in vision) associated with high readings. Instructed patient to go to the ED if BP >180/110 and accompanied by hypertensive s/s, patient confirms understanding.    Patient reports adherence to her medication regimen. She takes both her amlodipine 10 and HCTZ 12.5 mg at 9 pm every night. 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    The history is provided by the patient.      Review of patient's allergies indicates:   -- Gluten protein -- Itching  Completed Medication Reconciliation  Verified pharmacy information.    HYPERTENSION    Explained non-pharmacologic therapies like low salt diet and physical activity can reduce blood pressure.       Explained that we expect patient to submit several blood pressure readings per week at random times of the day, but at least 30 minutes after taking blood pressure medications. Instructed patient not to allow anyone else to use their blood pressure monitor and phone as data submitted is directly entered into medical record. Reviewed and confirmed appropriate blood pressure monitoring technique.         Reviewed signs/symptoms of hypertension (headache, changes in vision, chest pain, shortness of breath)   Reviewed signs/symptoms of hypotension (lightheaded, dizziness, weakness)     Patient's BP goal is less than or equal to  130/80. Patients BP average is 143/91 mmHg, which is above goal, per 2017 ACC/AHA Hypertension Guidelines.   Patient reports she plans on getting pregnant in the future and will let me know when she is.       Last 5 Patient Entered Readings                                      Current 30 Day Average: 143/91     Recent Readings 7/23/2020 7/12/2020 7/10/2020 7/10/2020 7/9/2020    SBP (mmHg) 146 169 120 145 141    DBP (mmHg) 91 103 82 90 90    Pulse 76 60 78 71 62              Depression Screening  Sarah Brown did not answer the depression screening. She is not in treatment for depression and is not interested in a referral. Patient feels that depression symptoms are not currently controlled.    she states she stopped her medication because it made her feel bad. Discussed with her PCP who recommended she see a psychiatrist to change her medication    Sleep Apnea Screening  Patient not previously diagnosed with PRADIP and   sometimes she is unable to sleep but does not have sleep apnea.     Medication Affordability Screening  Patient did not answer the medication affordability questionnaires. Patient is currently not having problems affording medications    Medication Adherence-Medication adherence was assessed.  Patient continue taking medication as prescribed.            ASSESSMENT(S)  Patients BP average is 143/91 mmHg, which is above goal. Patient's BP goal is less than or equal to 130/80 per 2017 ACC/AHA Hypertension Guidelines.       PLAN  Labs ordered: BMP ordered to follow up on potassium after diuretic initiation by provider Expected Lab Date: 8/5/2020  Continue current therapy: Continue amlodipine 10 and HCTZ 12.5  Provided patient education: counseled patient on proper sleep habits and use of melatonin over-the-counter. Reminded patient to check her BP during the first half of the day since she takes her medications at night    Patient verbalizes understanding. Patient did not express questions or concerns  and patient has contact information if needed.    Explained the importance of self-monitoring and medication adherence. Encouraged the patient to communicate with their health  for lifestyle modifications to help improve or maintain a healthy lifestyle.      Sent link to [facility] webpages and my contact information via [employer] for future questions.      Explained to the patient that the Digital Medicine team is not available for emergencies. Advised patient call [facility] ([phone] or [phone]) or 911 if needed.         There are no preventive care reminders to display for this patient.    Current Medication Regimen:  Hypertension Medications             amLODIPine (NORVASC) 10 MG tablet Take 1 tablet (10 mg total) by mouth once daily.    hydroCHLOROthiazide (HYDRODIURIL) 12.5 MG Tab Take 1 tablet (12.5 mg total) by mouth once daily.

## 2020-08-05 ENCOUNTER — LAB VISIT (OUTPATIENT)
Dept: LAB | Facility: HOSPITAL | Age: 40
End: 2020-08-05
Attending: FAMILY MEDICINE
Payer: COMMERCIAL

## 2020-08-05 DIAGNOSIS — I10 ESSENTIAL HYPERTENSION: ICD-10-CM

## 2020-08-05 LAB
ANION GAP SERPL CALC-SCNC: 10 MMOL/L (ref 8–16)
BUN SERPL-MCNC: 8 MG/DL (ref 6–20)
CALCIUM SERPL-MCNC: 9.4 MG/DL (ref 8.7–10.5)
CHLORIDE SERPL-SCNC: 108 MMOL/L (ref 95–110)
CO2 SERPL-SCNC: 23 MMOL/L (ref 23–29)
CREAT SERPL-MCNC: 0.8 MG/DL (ref 0.5–1.4)
EST. GFR  (AFRICAN AMERICAN): >60 ML/MIN/1.73 M^2
EST. GFR  (NON AFRICAN AMERICAN): >60 ML/MIN/1.73 M^2
GLUCOSE SERPL-MCNC: 91 MG/DL (ref 70–110)
POTASSIUM SERPL-SCNC: 3.8 MMOL/L (ref 3.5–5.1)
SODIUM SERPL-SCNC: 141 MMOL/L (ref 136–145)

## 2020-08-05 PROCEDURE — 36415 COLL VENOUS BLD VENIPUNCTURE: CPT

## 2020-08-05 PROCEDURE — 80048 BASIC METABOLIC PNL TOTAL CA: CPT

## 2020-08-07 ENCOUNTER — PATIENT OUTREACH (OUTPATIENT)
Dept: OTHER | Facility: OTHER | Age: 40
End: 2020-08-07

## 2020-08-07 DIAGNOSIS — I10 ESSENTIAL HYPERTENSION: ICD-10-CM

## 2020-08-07 RX ORDER — HYDROCHLOROTHIAZIDE 25 MG/1
25 TABLET ORAL DAILY
Qty: 30 TABLET | Refills: 5 | Status: SHIPPED | OUTPATIENT
Start: 2020-08-07 | End: 2020-09-24 | Stop reason: SDUPTHER

## 2020-08-07 NOTE — PROGRESS NOTES
Digital Medicine: Clinician Follow-Up    Called patient for routine follow up regarding HDMP (Hypertension Digital Medicine Program)    HPI  Patients BP average is currently 138/89 mmHg.    Patient denies s/s of hypotension (lightheadedness, dizziness, nausea, fatigue) associated with low readings. Instructed patient to inform me if this occurs, patient confirms understanding.    Patient denies s/s of hypertension (SOB, CP, severe headaches, changes in vision) associated with high readings. Instructed patient to go to the ED if BP >180/110 and accompanied by hypertensive s/s, patient confirms understanding.      The history is provided by the patient.      Review of patient's allergies indicates:   -- Gluten protein -- Itching  Completed Medication Reconciliation    Follow-up reason(s): medication change follow-up.     Hypertension    Readings are trending down due to medication adherence.       Patient is not experiencing signs/symptoms of hypotension.  Patient is not experiencing signs/symptoms of hypertension.      Patient did make medication change.    Is patient tolerating med change? yes      Additional Follow-up details: Patient is tolerating HCTZ 12.5 mg well. Potassium level is 3.8 and she is increasing her intake of potassium-rich foods      Last 5 Patient Entered Readings                                      Current 30 Day Average: 138/89     Recent Readings 8/3/2020 8/2/2020 8/2/2020 7/30/2020 7/28/2020    SBP (mmHg) 126 128 155 133 138    DBP (mmHg) 82 72 99 93 89    Pulse 78 65 80 71 72               Depression Screening  Did not address depression screening.    Sleep Apnea Screening    Did not address sleep apnea screening.     Medication Affordability Screening  Did not address medication affordability screening.     Medication Adherence-Medication adherence was assessed.          ASSESSMENT(S)  Patients BP average is 138/89 mmHg, which is above goal. Patient's BP goal is less than or equal to  130/80 per 2017 ACC/AHA Hypertension Guidelines.       Hypertension Plan  Medication change. Increase HCTZ to 25 mg daily and continue amlodipine 10 mg  Labs ordered. Ordered BMP Expected Lab Date: 8/26/2020  Additional monitoring needed. Will follow up in 2 weeks       Addressed any questions or concerns and patient has my contact information if needed prior to next outreach. Patient verbalizes understanding.            There are no preventive care reminders to display for this patient.      Hypertension Medications             amLODIPine (NORVASC) 10 MG tablet TAKE 1 TABLET BY MOUTH EVERY DAY    hydroCHLOROthiazide (HYDRODIURIL) 12.5 MG Tab Take 1 tablet (12.5 mg total) by mouth once daily.        Tania Barry, PharmD  Digital Medicine Clinical Pharmacist  [phone]

## 2020-08-12 ENCOUNTER — PATIENT OUTREACH (OUTPATIENT)
Dept: OTHER | Facility: OTHER | Age: 40
End: 2020-08-12

## 2020-08-12 NOTE — PROGRESS NOTES
Digital Medicine: Health  Follow-Up    The history is provided by the patient.             Reason for review: Blood pressure not at goal        Topics Covered on Call: physical activity and Diet    Additional Follow-up details: Avg BP as of Aug 12, 2020 is 136/89. Stated that her 2 higher readings in the 150s were from when she was in some pain.     No changes to diet or exercise.     Stated her first month in the program has been going well.         Diet-no change to diet    No change to diet.        Physical Activity-no change to routine  No change to exercise routine.     Medication Adherence-Medication adherence was assessed.        Substance, Sleep, Stress-No change  stress-  Details:  Intervention(s):    Sleep-  Details:  Intervention(s):    Alcohol -  Details:  Intervention(s):    Tobacco-  Details:  Intervention(s):          Continue current diet/physical activity routine.       Addressed any questions or concerns and patient has my contact information if needed prior to next outreach. Patient verbalizes understanding.      Explained the importance of self-monitoring and medication adherence. Encouraged the patient to communicate with their health  for lifestyle modifications to help improve or maintain a healthy lifestyle.            There are no preventive care reminders to display for this patient.    Last 5 Patient Entered Readings                                      Current 30 Day Average: 136/89     Recent Readings 8/10/2020 8/9/2020 8/3/2020 8/2/2020 8/2/2020    SBP (mmHg) 127 151 126 128 155    DBP (mmHg) 86 92 82 72 99    Pulse 73 59 78 65 80

## 2020-08-20 ENCOUNTER — PATIENT OUTREACH (OUTPATIENT)
Dept: OTHER | Facility: OTHER | Age: 40
End: 2020-08-20

## 2020-08-20 NOTE — PROGRESS NOTES
Attempted to reach patient for routine follow up for follow up on hctz dose increase to 25 mg daily. Reviewed available blood pressure readings and labs. Per 2017 ACC/AHA Hypertension Guidelines, current 30-day average is 135/88 and is not at goal.     Last 5 Patient Entered Readings                                      Current 30 Day Average: 135/88     Recent Readings 8/13/2020 8/12/2020 8/10/2020 8/9/2020 8/3/2020    SBP (mmHg) 135 122 127 151 126    DBP (mmHg) 88 78 86 92 82    Pulse 79 72 73 59 78          BMP  Lab Results   Component Value Date     08/05/2020    K 3.8 08/05/2020     08/05/2020    CO2 23 08/05/2020    BUN 8 08/05/2020    CREATININE 0.8 08/05/2020    CALCIUM 9.4 08/05/2020    ANIONGAP 10 08/05/2020    ESTGFRAFRICA >60.0 08/05/2020    EGFRNONAA >60.0 08/05/2020       Left a voicemail and requested patient call back.    Will continue to monitor regularly. Will follow up in 1 week, sooner if blood pressure begins to trend up.    Tania Barry, PharmD  Digital Medicine Clinical Pharmacist  [phone]

## 2020-08-27 NOTE — PROGRESS NOTES
Digital Medicine: Clinician Follow-Up    Called patient for routine follow up regarding HDMP (Hypertension Digital Medicine Program) and follow up on hctz dose increase    HPI  Patients BP average is currently 132/86 mmHg.    Patient denies s/s of hypotension (lightheadedness, dizziness, nausea, fatigue) associated with low readings. Instructed patient to inform me if this occurs, patient confirms understanding.    Patient denies s/s of hypertension (SOB, CP, severe headaches, changes in vision) associated with high readings. Instructed patient to go to the ED if BP >180/110 and accompanied by hypertensive s/s, patient confirms understanding.      The history is provided by the patient.   Follow-up reason(s): medication change follow-up.     Hypertension    Readings are trending down due to medication adherence.       Patient is not experiencing signs/symptoms of hypotension.  Patient is not experiencing signs/symptoms of hypertension.      Patient did make medication change.    Is patient tolerating med change? yes      Additional Follow-up details: Patient increased hctz to 25 mg on 8/7. She reports no side effects or concerns. She takes it in the morning and takes amlodipine 10 mg in the evening. She checks her Bp around 10 pm.      Last 5 Patient Entered Readings                                      Current 30 Day Average: 132/86     Recent Readings 8/21/2020 8/13/2020 8/12/2020 8/10/2020 8/9/2020    SBP (mmHg) 132 135 122 127 151    DBP (mmHg) 85 88 78 86 92    Pulse 77 79 72 73 59               Depression Screening  Did not address depression screening.    Sleep Apnea Screening    Did not address sleep apnea screening.     Medication Affordability Screening  Did not address medication affordability screening.     Medication Adherence-Medication adherence was assessed.          ASSESSMENT(S)  Patients BP average is 132/86 mmHg, which is above goal. Patient's BP goal is less than or equal to 130/80 per 2017  ACC/AHA Hypertension Guidelines.       Hypertension Plan  Additional monitoring needed.  Continue current therapy.  Will call patient in a few weeks, sooner if needed. If BP is still elevated, will change amlodipine to nifedipine 60 mg. Patient knows to reach out at any time for any questions or concerns.        Addressed any questions or concerns and patient has my contact information if needed prior to next outreach. Patient verbalizes understanding.            There are no preventive care reminders to display for this patient.      Hypertension Medications             amLODIPine (NORVASC) 10 MG tablet TAKE 1 TABLET BY MOUTH EVERY DAY    hydroCHLOROthiazide (HYDRODIURIL) 25 MG tablet Take 1 tablet (25 mg total) by mouth once daily.        Tania Barry, PharmD  Digital Medicine Clinical Pharmacist  [phone]

## 2020-09-09 ENCOUNTER — PATIENT OUTREACH (OUTPATIENT)
Dept: OTHER | Facility: OTHER | Age: 40
End: 2020-09-09

## 2020-09-09 NOTE — PROGRESS NOTES
Digital Medicine: Health  Follow-Up    The history is provided by the patient.             Reason for review: Blood pressure not at goal        Topics Covered on Call: physical activity and Diet    Additional Follow-up details: Avg BP as of Sept 9, 2020 is 131/83.    No diet or exercise changes. No issues or concerns.     Reminded to relax and take some breaths before readings to see if that keeps readings lower.         Diet-no change to diet    No change to diet.        Physical Activity-no change to routine  No change to exercise routine.     Medication Adherence-Medication adherence was assessed.      Substance, Sleep, Stress-Not assessed      Continue current diet/physical activity routine.  Reviewed Device Techniques. Reminded about relaxing before readings.      Addressed any questions or concerns and patient has my contact information if needed prior to next outreach. Patient verbalizes understanding.      Explained the importance of self-monitoring and medication adherence. Encouraged the patient to communicate with their health  for lifestyle modifications to help improve or maintain a healthy lifestyle.            There are no preventive care reminders to display for this patient.    Last 5 Patient Entered Readings                                      Current 30 Day Average: 131/83     Recent Readings 9/7/2020 8/30/2020 8/21/2020 8/13/2020 8/12/2020    SBP (mmHg) 144 123 132 135 122    DBP (mmHg) 83 79 85 88 78    Pulse 71 71 77 79 72

## 2020-09-24 ENCOUNTER — PATIENT OUTREACH (OUTPATIENT)
Dept: OTHER | Facility: OTHER | Age: 40
End: 2020-09-24

## 2020-09-24 DIAGNOSIS — I10 ESSENTIAL HYPERTENSION: ICD-10-CM

## 2020-09-24 RX ORDER — HYDROCHLOROTHIAZIDE 25 MG/1
25 TABLET ORAL DAILY
Qty: 30 TABLET | Refills: 5 | Status: SHIPPED | OUTPATIENT
Start: 2020-09-24 | End: 2021-01-25 | Stop reason: SDUPTHER

## 2020-09-24 NOTE — PROGRESS NOTES
Digital Medicine: Clinician Follow-Up    Called patient for routine follow up regarding HDMP (Hypertension Digital Medicine Program)    HPI  Patients BP average is currently 140/89 mmHg.    Patient states she lost her HCTZ bottle a week ago and has not taken it since. She also states her BP on 9/22 (154/104) was a mistake because she checked it as soon as she finished working out.    The history is provided by the patient.      Review of patient's allergies indicates:   -- Gluten protein -- Itching  Follow-up reason(s): routine follow up.     Hypertension    Readings are trending up   Patient is not experiencing signs/symptoms of hypotension.  Patient is not experiencing signs/symptoms of hypertension.          Last 5 Patient Entered Readings                                      Current 30 Day Average: 140/89     Recent Readings 9/22/2020 9/7/2020 8/30/2020 8/21/2020 8/13/2020    SBP (mmHg) 154 144 123 132 135    DBP (mmHg) 104 83 79 85 88    Pulse 81 71 71 77 79                 Depression Screening  Did not address depression screening.    Sleep Apnea Screening    Did not address sleep apnea screening.     Medication Affordability Screening  Did not address medication affordability screening.     Medication Adherence-Medication adherence was asssessed.    Patient reported missing medication: more than once a week and she lost her HCTZ.          ASSESSMENT(S)  Patients BP average is 140/89 mmHg, which is above goal. Patient's BP goal is less than or equal to 130/80 per 2017 ACC/AHA Hypertension Guidelines.     Hypertension Plan  Additional monitoring needed. Encouraged patient to check BP at least 3x/week  Continue current therapy. Reordered HCTZ prescription and told patient to restart it ASAP  If patient's BP is still elevated in a few weeks, will change amlodipine to nifedipine 60 mg. Will call patient in a few weeks, sooner if needed. Patient knows to reach out at any time for any questions or concerns.         Addressed patient questions and patient has my contact information if needed prior to next outreach. Patient verbalizes understanding.            There are no preventive care reminders to display for this patient.  There are no preventive care reminders to display for this patient.    Hypertension Medications             amLODIPine (NORVASC) 10 MG tablet TAKE 1 TABLET BY MOUTH EVERY DAY    hydroCHLOROthiazide (HYDRODIURIL) 25 MG tablet Take 1 tablet (25 mg total) by mouth once daily.        Tania Barry, PharmD  Digital Medicine Clinical Pharmacist  [phone]

## 2020-09-25 ENCOUNTER — PATIENT MESSAGE (OUTPATIENT)
Dept: OTHER | Facility: OTHER | Age: 40
End: 2020-09-25

## 2020-10-07 ENCOUNTER — PATIENT OUTREACH (OUTPATIENT)
Dept: OTHER | Facility: OTHER | Age: 40
End: 2020-10-07

## 2020-10-07 NOTE — PROGRESS NOTES
Digital Medicine: Health  Follow-Up    The history is provided by the patient.             Reason for review: Blood pressure not at goal        Topics Covered on Call: physical activity and Diet    Additional Follow-up details: Avg BP as of Oct 7, 2020 is 140/88.    Having some back pain lately, thinks the reading from Sept 22 (154/104) was associated with that. She denied s/s of HTN with that reading.     She didn't note any diet or exercise changes.             Diet-no change to diet    No change to diet.        Physical Activity-no change to routine  No change to exercise routine.     Medication Adherence-Medication Adherence not addressed.        Substance, Sleep, Stress-change  stress-assessed  Details:back pain  Intervention(s):    Sleep-  Details:  Intervention(s):    Alcohol -  Details:  Intervention(s):    Tobacco-  Details:  Intervention(s):          Continue current diet/physical activity routine.       Addressed patient questions and patient has my contact information if needed prior to next outreach. Patient verbalizes understanding.      Explained the importance of self-monitoring and medication adherence. Encouraged the patient to communicate with their health  for lifestyle modifications to help improve or maintain a healthy lifestyle.               There are no preventive care reminders to display for this patient.      Last 5 Patient Entered Readings                                      Current 30 Day Average: 140/88     Recent Readings 9/29/2020 9/28/2020 9/25/2020 9/22/2020 9/7/2020    SBP (mmHg) 130 130 141 154 144    DBP (mmHg) 81 86 87 104 83    Pulse 76 67 64 81 71

## 2020-10-29 ENCOUNTER — PATIENT OUTREACH (OUTPATIENT)
Dept: OTHER | Facility: OTHER | Age: 40
End: 2020-10-29

## 2020-10-29 NOTE — PROGRESS NOTES
Attempted to reach patient for routine follow up. Reviewed available blood pressure readings and labs. Per 2017 ACC/AHA Hypertension Guidelines, current 30-day average is 130/85 and is not at goal. Will change amlodipine to Nifedipine 60 mg for stronger BP control.    Last 5 Patient Entered Readings                                      Current 30 Day Average: 130/85     Recent Readings 10/15/2020 10/13/2020 10/12/2020 9/29/2020 9/28/2020    SBP (mmHg) 120 121 148 130 130    DBP (mmHg) 84 76 99 81 86    Pulse 76 79 68 76 67          BMP  Lab Results   Component Value Date     08/05/2020    K 3.8 08/05/2020     08/05/2020    CO2 23 08/05/2020    BUN 8 08/05/2020    CREATININE 0.8 08/05/2020    CALCIUM 9.4 08/05/2020    ANIONGAP 10 08/05/2020    ESTGFRAFRICA >60.0 08/05/2020    EGFRNONAA >60.0 08/05/2020       Left a voicemail and requested patient call back.    Will continue to monitor regularly. Will follow up in 2 weeks, sooner if blood pressure begins to trend up.    Tania Barry, PharmD  Digital Medicine Clinical Pharmacist  [phone]

## 2020-11-04 ENCOUNTER — PATIENT OUTREACH (OUTPATIENT)
Dept: OTHER | Facility: OTHER | Age: 40
End: 2020-11-04

## 2020-11-04 NOTE — PROGRESS NOTES
Digital Medicine: Health  Follow-Up    The history is provided by the patient.             Reason for review: Blood pressure not at goal        Topics Covered on Call: physical activity and Diet    Additional Follow-up details: Avg BP as of Nov 4, 2020 is 131/86. Slight trend down.     No changes to diet or exercise.                 Diet-no change to diet    No change to diet.        Physical Activity-no change to routine  No change to exercise routine.     Medication Adherence-Medication Adherence not addressed.        Substance, Sleep, Stress-No change  stress-  Details:  Intervention(s):    Sleep-  Details:  Intervention(s):    Alcohol -  Details:  Intervention(s):    Tobacco-  Details:  Intervention(s):          Continue current diet/physical activity routine.       Addressed patient questions and patient has my contact information if needed prior to next outreach. Patient verbalizes understanding.      Explained the importance of self-monitoring and medication adherence. Encouraged the patient to communicate with their health  for lifestyle modifications to help improve or maintain a healthy lifestyle.               There are no preventive care reminders to display for this patient.      Last 5 Patient Entered Readings                                      Current 30 Day Average: 131/86     Recent Readings 10/30/2020 10/15/2020 10/13/2020 10/12/2020 9/29/2020    SBP (mmHg) 134 120 121 148 130    DBP (mmHg) 83 84 76 99 81    Pulse 68 76 79 68 76

## 2020-12-02 ENCOUNTER — PATIENT OUTREACH (OUTPATIENT)
Dept: OTHER | Facility: OTHER | Age: 40
End: 2020-12-02

## 2020-12-02 NOTE — PROGRESS NOTES
Digital Medicine: Health  Follow-Up    The history is provided by the patient.             Reason for review: Blood pressure at goal        Topics Covered on Call: physical activity and Diet    Additional Follow-up details: Avg BP as of Dec 2, 2020 is 130/74.     Stressed out with work.     Hasn't been exercising.              Diet-no change to diet    No change to diet.        Physical Activity-Change      Additional physical activity details: Hasn't been able to workout      Medication Adherence-Medication Adherence not addressed.        Substance, Sleep, Stress-change  stress-assessed  Details:stress at work currently  Intervention(s):    Sleep-  Details:  Intervention(s):    Alcohol -  Details:  Intervention(s):    Tobacco-  Details:  Intervention(s):          Continue current diet/physical activity routine.       Addressed patient questions and patient has my contact information if needed prior to next outreach. Patient verbalizes understanding.      Explained the importance of self-monitoring and medication adherence. Encouraged the patient to communicate with their health  for lifestyle modifications to help improve or maintain a healthy lifestyle.               There are no preventive care reminders to display for this patient.      Last 5 Patient Entered Readings                                      Current 30 Day Average: 130/74     Recent Readings 11/22/2020 11/9/2020 10/30/2020 10/15/2020 10/13/2020    SBP (mmHg) 139 121 134 120 121    DBP (mmHg) 75 73 83 84 76    Pulse 79 65 68 76 79

## 2020-12-11 NOTE — PROGRESS NOTES
Digital Medicine: Clinician Follow-Up    Called patient for routine follow up regarding HDMP (Hypertension Digital Medicine Program)    HPI  Patients BP average is currently 132/75 mmHg.    Patient takes amlodipine 10 mg every evening and hctz 25 mg every morning. She does not need additional refills at this time.     The history is provided by the patient.   Follow-up reason(s): routine follow up.     Hypertension    Readings are trending down due to medication adherence.       Patient is not experiencing signs/symptoms of hypotension.  Patient is not experiencing signs/symptoms of hypertension.            Last 5 Patient Entered Readings                                      Current 30 Day Average: 132/75     Recent Readings 12/3/2020 11/22/2020 11/9/2020 10/30/2020 10/15/2020    SBP (mmHg) 124 139 121 134 120    DBP (mmHg) 75 75 73 83 84    Pulse 71 79 65 68 76                 Depression Screening  Did not address depression screening.    Sleep Apnea Screening    Did not address sleep apnea screening.     Medication Affordability Screening  Did not address medication affordability screening.     Medication Adherence-Medication adherence was assessed.          ASSESSMENT(S)  Patients BP average is 132/75 mmHg, which is at goal. Patient's BP goal is less than or equal to 130/80.    Hypertension Plan  Additional monitoring needed. Informed patient to check her BP at least once weekly  Continue current therapy.  Continue current diet/physical activity routine.  Will call patient in a few months, sooner if needed. Patient knows to reach out at any time for any questions or concerns.        Addressed patient questions and patient has my contact information if needed prior to next outreach. Patient verbalizes understanding.             There are no preventive care reminders to display for this patient.  There are no preventive care reminders to display for this patient.      Hypertension Medications              amLODIPine (NORVASC) 10 MG tablet TAKE 1 TABLET BY MOUTH EVERY DAY    hydroCHLOROthiazide (HYDRODIURIL) 25 MG tablet Take 1 tablet (25 mg total) by mouth once daily.          Tania Barry, PharmD  Digital Medicine Clinician  [phone]

## 2020-12-14 ENCOUNTER — PATIENT OUTREACH (OUTPATIENT)
Dept: ADMINISTRATIVE | Facility: HOSPITAL | Age: 40
End: 2020-12-14

## 2020-12-14 NOTE — PROGRESS NOTES
Working HTN Report       Called pt for home BP Reading, No answer..        Lexi BRAVO LPN Care Coordinator  Care Coordination Department  [facility]  [phone]

## 2021-01-21 ENCOUNTER — CLINICAL SUPPORT (OUTPATIENT)
Dept: URGENT CARE | Facility: CLINIC | Age: 41
End: 2021-01-21
Payer: COMMERCIAL

## 2021-01-21 VITALS — OXYGEN SATURATION: 99 % | HEART RATE: 64 BPM | TEMPERATURE: 98 F

## 2021-01-21 DIAGNOSIS — R52 BODY ACHES: Primary | ICD-10-CM

## 2021-01-21 LAB
CTP QC/QA: YES
SARS-COV-2 RDRP RESP QL NAA+PROBE: NEGATIVE

## 2021-01-21 PROCEDURE — U0002: ICD-10-PCS | Mod: QW,S$GLB,, | Performed by: INTERNAL MEDICINE

## 2021-01-21 PROCEDURE — 99211 OFF/OP EST MAY X REQ PHY/QHP: CPT | Mod: S$GLB,,, | Performed by: INTERNAL MEDICINE

## 2021-01-21 PROCEDURE — U0002 COVID-19 LAB TEST NON-CDC: HCPCS | Mod: QW,S$GLB,, | Performed by: INTERNAL MEDICINE

## 2021-01-21 PROCEDURE — 99211 PR OFFICE/OUTPT VISIT, EST, LEVL I: ICD-10-PCS | Mod: S$GLB,,, | Performed by: INTERNAL MEDICINE

## 2021-01-25 ENCOUNTER — HOSPITAL ENCOUNTER (OUTPATIENT)
Dept: RADIOLOGY | Facility: HOSPITAL | Age: 41
Discharge: HOME | End: 2021-01-25
Attending: FAMILY MEDICINE
Payer: COMMERCIAL

## 2021-01-25 ENCOUNTER — OFFICE VISIT (OUTPATIENT)
Dept: INTERNAL MEDICINE | Facility: CLINIC | Age: 41
End: 2021-01-25
Payer: COMMERCIAL

## 2021-01-25 ENCOUNTER — LAB VISIT (OUTPATIENT)
Dept: LAB | Facility: HOSPITAL | Age: 41
End: 2021-01-25
Attending: FAMILY MEDICINE
Payer: COMMERCIAL

## 2021-01-25 ENCOUNTER — LAB VISIT (OUTPATIENT)
Dept: LAB | Facility: HOSPITAL | Age: 41
End: 2021-01-25
Attending: ALLERGY & IMMUNOLOGY
Payer: COMMERCIAL

## 2021-01-25 VITALS
OXYGEN SATURATION: 96 % | DIASTOLIC BLOOD PRESSURE: 80 MMHG | WEIGHT: 155.19 LBS | HEART RATE: 92 BPM | RESPIRATION RATE: 16 BRPM | TEMPERATURE: 98 F | SYSTOLIC BLOOD PRESSURE: 130 MMHG | BODY MASS INDEX: 27.5 KG/M2 | HEIGHT: 63 IN

## 2021-01-25 VITALS — HEIGHT: 63 IN | WEIGHT: 155.19 LBS | BODY MASS INDEX: 27.5 KG/M2

## 2021-01-25 DIAGNOSIS — G43.719 INTRACTABLE CHRONIC MIGRAINE WITHOUT AURA AND WITHOUT STATUS MIGRAINOSUS: ICD-10-CM

## 2021-01-25 DIAGNOSIS — Z12.31 ENCOUNTER FOR SCREENING MAMMOGRAM FOR MALIGNANT NEOPLASM OF BREAST: ICD-10-CM

## 2021-01-25 DIAGNOSIS — Z11.59 NEED FOR HEPATITIS C SCREENING TEST: ICD-10-CM

## 2021-01-25 DIAGNOSIS — I10 ESSENTIAL HYPERTENSION: ICD-10-CM

## 2021-01-25 DIAGNOSIS — M62.830 SPASM OF THORACIC BACK MUSCLE: ICD-10-CM

## 2021-01-25 DIAGNOSIS — M47.26 OSTEOARTHRITIS OF SPINE WITH RADICULOPATHY, LUMBAR REGION: Primary | ICD-10-CM

## 2021-01-25 LAB
BILIRUB UR QL STRIP: NEGATIVE
CHOLEST SERPL-MCNC: 180 MG/DL (ref 120–199)
CHOLEST/HDLC SERPL: 2.9 {RATIO} (ref 2–5)
CLARITY UR: ABNORMAL
COLOR UR: YELLOW
GLUCOSE UR QL STRIP: NEGATIVE
HDLC SERPL-MCNC: 63 MG/DL (ref 40–75)
HDLC SERPL: 35 % (ref 20–50)
HGB UR QL STRIP: NEGATIVE
KETONES UR QL STRIP: ABNORMAL
LDLC SERPL CALC-MCNC: 84.2 MG/DL (ref 63–159)
LEUKOCYTE ESTERASE UR QL STRIP: NEGATIVE
NITRITE UR QL STRIP: NEGATIVE
NONHDLC SERPL-MCNC: 117 MG/DL
PH UR STRIP: 6 [PH] (ref 5–8)
PROT UR QL STRIP: NEGATIVE
SP GR UR STRIP: 1.02 (ref 1–1.03)
TRIGL SERPL-MCNC: 164 MG/DL (ref 30–150)
URN SPEC COLLECT METH UR: ABNORMAL

## 2021-01-25 PROCEDURE — 3075F SYST BP GE 130 - 139MM HG: CPT | Mod: CPTII,S$GLB,, | Performed by: FAMILY MEDICINE

## 2021-01-25 PROCEDURE — 3079F DIAST BP 80-89 MM HG: CPT | Mod: CPTII,S$GLB,, | Performed by: FAMILY MEDICINE

## 2021-01-25 PROCEDURE — 80061 LIPID PANEL: CPT

## 2021-01-25 PROCEDURE — 77067 SCR MAMMO BI INCL CAD: CPT | Mod: 26,,, | Performed by: RADIOLOGY

## 2021-01-25 PROCEDURE — 77067 MAMMO DIGITAL SCREENING BILAT WITH TOMO: ICD-10-PCS | Mod: 26,,, | Performed by: RADIOLOGY

## 2021-01-25 PROCEDURE — 99999 PR PBB SHADOW E&M-EST. PATIENT-LVL IV: CPT | Mod: PBBFAC,,, | Performed by: FAMILY MEDICINE

## 2021-01-25 PROCEDURE — 1125F PR PAIN SEVERITY QUANTIFIED, PAIN PRESENT: ICD-10-PCS | Mod: S$GLB,,, | Performed by: FAMILY MEDICINE

## 2021-01-25 PROCEDURE — 77067 SCR MAMMO BI INCL CAD: CPT | Mod: TC

## 2021-01-25 PROCEDURE — 77063 BREAST TOMOSYNTHESIS BI: CPT | Mod: 26,,, | Performed by: RADIOLOGY

## 2021-01-25 PROCEDURE — 99214 PR OFFICE/OUTPT VISIT, EST, LEVL IV, 30-39 MIN: ICD-10-PCS | Mod: S$GLB,,, | Performed by: FAMILY MEDICINE

## 2021-01-25 PROCEDURE — 3008F BODY MASS INDEX DOCD: CPT | Mod: CPTII,S$GLB,, | Performed by: FAMILY MEDICINE

## 2021-01-25 PROCEDURE — 1125F AMNT PAIN NOTED PAIN PRSNT: CPT | Mod: S$GLB,,, | Performed by: FAMILY MEDICINE

## 2021-01-25 PROCEDURE — 3008F PR BODY MASS INDEX (BMI) DOCUMENTED: ICD-10-PCS | Mod: CPTII,S$GLB,, | Performed by: FAMILY MEDICINE

## 2021-01-25 PROCEDURE — 81003 URINALYSIS AUTO W/O SCOPE: CPT

## 2021-01-25 PROCEDURE — 77063 MAMMO DIGITAL SCREENING BILAT WITH TOMO: ICD-10-PCS | Mod: 26,,, | Performed by: RADIOLOGY

## 2021-01-25 PROCEDURE — 99999 PR PBB SHADOW E&M-EST. PATIENT-LVL IV: ICD-10-PCS | Mod: PBBFAC,,, | Performed by: FAMILY MEDICINE

## 2021-01-25 PROCEDURE — 3079F PR MOST RECENT DIASTOLIC BLOOD PRESSURE 80-89 MM HG: ICD-10-PCS | Mod: CPTII,S$GLB,, | Performed by: FAMILY MEDICINE

## 2021-01-25 PROCEDURE — 3075F PR MOST RECENT SYSTOLIC BLOOD PRESS GE 130-139MM HG: ICD-10-PCS | Mod: CPTII,S$GLB,, | Performed by: FAMILY MEDICINE

## 2021-01-25 PROCEDURE — 99214 OFFICE O/P EST MOD 30 MIN: CPT | Mod: S$GLB,,, | Performed by: FAMILY MEDICINE

## 2021-01-25 RX ORDER — NAPROXEN 500 MG/1
500 TABLET ORAL 2 TIMES DAILY PRN
Qty: 30 TABLET | Refills: 0 | Status: SHIPPED | OUTPATIENT
Start: 2021-01-25 | End: 2023-01-25 | Stop reason: SDUPTHER

## 2021-01-25 RX ORDER — HYDROCHLOROTHIAZIDE 25 MG/1
25 TABLET ORAL DAILY
Qty: 90 TABLET | Refills: 1 | Status: SHIPPED | OUTPATIENT
Start: 2021-01-25 | End: 2021-11-04 | Stop reason: SDUPTHER

## 2021-01-25 RX ORDER — AMLODIPINE BESYLATE 10 MG/1
10 TABLET ORAL DAILY
Qty: 90 TABLET | Refills: 1 | Status: SHIPPED | OUTPATIENT
Start: 2021-01-25 | End: 2021-05-07 | Stop reason: ALTCHOICE

## 2021-01-25 RX ORDER — METHOCARBAMOL 750 MG/1
750 TABLET, FILM COATED ORAL 3 TIMES DAILY PRN
Qty: 30 TABLET | Refills: 0 | Status: SHIPPED | OUTPATIENT
Start: 2021-01-25 | End: 2021-02-04

## 2021-04-28 ENCOUNTER — PATIENT MESSAGE (OUTPATIENT)
Dept: RESEARCH | Facility: HOSPITAL | Age: 41
End: 2021-04-28

## 2021-05-17 ENCOUNTER — OFFICE VISIT (OUTPATIENT)
Dept: INTERNAL MEDICINE | Facility: CLINIC | Age: 41
End: 2021-05-17
Payer: COMMERCIAL

## 2021-05-17 VITALS
SYSTOLIC BLOOD PRESSURE: 150 MMHG | BODY MASS INDEX: 26.83 KG/M2 | HEIGHT: 63 IN | TEMPERATURE: 98 F | DIASTOLIC BLOOD PRESSURE: 84 MMHG | HEART RATE: 80 BPM | WEIGHT: 151.44 LBS

## 2021-05-17 DIAGNOSIS — R05.9 COUGH: ICD-10-CM

## 2021-05-17 DIAGNOSIS — R43.9 PROBLEMS WITH SMELL AND TASTE: ICD-10-CM

## 2021-05-17 DIAGNOSIS — J32.9 SINUSITIS, UNSPECIFIED CHRONICITY, UNSPECIFIED LOCATION: Primary | ICD-10-CM

## 2021-05-17 DIAGNOSIS — M79.10 MUSCLE PAIN: ICD-10-CM

## 2021-05-17 PROCEDURE — 99214 OFFICE O/P EST MOD 30 MIN: CPT | Mod: S$GLB,,, | Performed by: PHYSICIAN ASSISTANT

## 2021-05-17 PROCEDURE — 99214 PR OFFICE/OUTPT VISIT, EST, LEVL IV, 30-39 MIN: ICD-10-PCS | Mod: S$GLB,,, | Performed by: PHYSICIAN ASSISTANT

## 2021-05-17 PROCEDURE — 99999 PR PBB SHADOW E&M-EST. PATIENT-LVL III: CPT | Mod: PBBFAC,,, | Performed by: PHYSICIAN ASSISTANT

## 2021-05-17 PROCEDURE — 3008F BODY MASS INDEX DOCD: CPT | Mod: CPTII,S$GLB,, | Performed by: PHYSICIAN ASSISTANT

## 2021-05-17 PROCEDURE — 99999 PR PBB SHADOW E&M-EST. PATIENT-LVL III: ICD-10-PCS | Mod: PBBFAC,,, | Performed by: PHYSICIAN ASSISTANT

## 2021-05-17 PROCEDURE — U0003 INFECTIOUS AGENT DETECTION BY NUCLEIC ACID (DNA OR RNA); SEVERE ACUTE RESPIRATORY SYNDROME CORONAVIRUS 2 (SARS-COV-2) (CORONAVIRUS DISEASE [COVID-19]), AMPLIFIED PROBE TECHNIQUE, MAKING USE OF HIGH THROUGHPUT TECHNOLOGIES AS DESCRIBED BY CMS-2020-01-R: HCPCS | Performed by: PHYSICIAN ASSISTANT

## 2021-05-17 PROCEDURE — 3008F PR BODY MASS INDEX (BMI) DOCUMENTED: ICD-10-PCS | Mod: CPTII,S$GLB,, | Performed by: PHYSICIAN ASSISTANT

## 2021-05-17 PROCEDURE — U0005 INFEC AGEN DETEC AMPLI PROBE: HCPCS | Performed by: PHYSICIAN ASSISTANT

## 2021-05-17 RX ORDER — AZITHROMYCIN 250 MG/1
TABLET, FILM COATED ORAL
Qty: 6 TABLET | Refills: 0 | Status: SHIPPED | OUTPATIENT
Start: 2021-05-17 | End: 2021-05-22

## 2021-05-18 LAB — SARS-COV-2 RNA RESP QL NAA+PROBE: NOT DETECTED

## 2021-09-30 ENCOUNTER — LAB VISIT (OUTPATIENT)
Dept: PRIMARY CARE CLINIC | Facility: OTHER | Age: 41
End: 2021-09-30
Attending: INTERNAL MEDICINE
Payer: COMMERCIAL

## 2021-09-30 DIAGNOSIS — Z20.822 ENCOUNTER FOR LABORATORY TESTING FOR COVID-19 VIRUS: ICD-10-CM

## 2021-09-30 PROCEDURE — U0003 INFECTIOUS AGENT DETECTION BY NUCLEIC ACID (DNA OR RNA); SEVERE ACUTE RESPIRATORY SYNDROME CORONAVIRUS 2 (SARS-COV-2) (CORONAVIRUS DISEASE [COVID-19]), AMPLIFIED PROBE TECHNIQUE, MAKING USE OF HIGH THROUGHPUT TECHNOLOGIES AS DESCRIBED BY CMS-2020-01-R: HCPCS | Performed by: INTERNAL MEDICINE

## 2021-10-01 LAB
SARS-COV-2 RNA RESP QL NAA+PROBE: NOT DETECTED
SARS-COV-2- CYCLE NUMBER: NORMAL

## 2021-10-15 ENCOUNTER — LAB VISIT (OUTPATIENT)
Dept: PRIMARY CARE CLINIC | Facility: OTHER | Age: 41
End: 2021-10-15
Attending: INTERNAL MEDICINE
Payer: COMMERCIAL

## 2021-10-15 DIAGNOSIS — Z20.822 ENCOUNTER FOR LABORATORY TESTING FOR COVID-19 VIRUS: ICD-10-CM

## 2021-10-15 LAB
SARS-COV-2 RNA RESP QL NAA+PROBE: NOT DETECTED
SARS-COV-2- CYCLE NUMBER: NORMAL

## 2021-10-15 PROCEDURE — U0003 INFECTIOUS AGENT DETECTION BY NUCLEIC ACID (DNA OR RNA); SEVERE ACUTE RESPIRATORY SYNDROME CORONAVIRUS 2 (SARS-COV-2) (CORONAVIRUS DISEASE [COVID-19]), AMPLIFIED PROBE TECHNIQUE, MAKING USE OF HIGH THROUGHPUT TECHNOLOGIES AS DESCRIBED BY CMS-2020-01-R: HCPCS | Performed by: INTERNAL MEDICINE

## 2021-12-27 ENCOUNTER — LAB VISIT (OUTPATIENT)
Dept: PRIMARY CARE CLINIC | Facility: OTHER | Age: 41
End: 2021-12-27
Attending: INTERNAL MEDICINE
Payer: COMMERCIAL

## 2021-12-27 ENCOUNTER — PATIENT MESSAGE (OUTPATIENT)
Dept: ADMINISTRATIVE | Facility: OTHER | Age: 41
End: 2021-12-27
Payer: COMMERCIAL

## 2021-12-27 DIAGNOSIS — Z20.822 ENCOUNTER FOR LABORATORY TESTING FOR COVID-19 VIRUS: ICD-10-CM

## 2021-12-27 PROCEDURE — U0003 INFECTIOUS AGENT DETECTION BY NUCLEIC ACID (DNA OR RNA); SEVERE ACUTE RESPIRATORY SYNDROME CORONAVIRUS 2 (SARS-COV-2) (CORONAVIRUS DISEASE [COVID-19]), AMPLIFIED PROBE TECHNIQUE, MAKING USE OF HIGH THROUGHPUT TECHNOLOGIES AS DESCRIBED BY CMS-2020-01-R: HCPCS | Performed by: INTERNAL MEDICINE

## 2021-12-29 LAB
SARS-COV-2 RNA RESP QL NAA+PROBE: DETECTED
SARS-COV-2- CYCLE NUMBER: 15

## 2022-03-23 ENCOUNTER — PATIENT MESSAGE (OUTPATIENT)
Dept: RESEARCH | Facility: HOSPITAL | Age: 42
End: 2022-03-23
Payer: COMMERCIAL

## 2022-04-27 ENCOUNTER — PATIENT MESSAGE (OUTPATIENT)
Dept: ADMINISTRATIVE | Facility: HOSPITAL | Age: 42
End: 2022-04-27
Payer: COMMERCIAL

## 2022-07-27 ENCOUNTER — PATIENT MESSAGE (OUTPATIENT)
Dept: ADMINISTRATIVE | Facility: HOSPITAL | Age: 42
End: 2022-07-27
Payer: COMMERCIAL

## 2022-10-07 ENCOUNTER — PATIENT OUTREACH (OUTPATIENT)
Dept: ADMINISTRATIVE | Facility: HOSPITAL | Age: 42
End: 2022-10-07
Payer: COMMERCIAL

## 2022-10-07 NOTE — PROGRESS NOTES
Attempted to contact patient by phone for PCP visit, was able to speak to patient. Scheduled patient for PCP visit on 12/5/2022

## 2022-10-20 ENCOUNTER — PATIENT MESSAGE (OUTPATIENT)
Dept: ADMINISTRATIVE | Facility: HOSPITAL | Age: 42
End: 2022-10-20
Payer: COMMERCIAL

## 2023-01-25 ENCOUNTER — IMMUNIZATION (OUTPATIENT)
Dept: INTERNAL MEDICINE | Facility: CLINIC | Age: 43
End: 2023-01-25
Payer: COMMERCIAL

## 2023-01-25 ENCOUNTER — IMMUNIZATION (OUTPATIENT)
Dept: PHARMACY | Facility: CLINIC | Age: 43
End: 2023-01-25
Payer: COMMERCIAL

## 2023-01-25 ENCOUNTER — LAB VISIT (OUTPATIENT)
Dept: LAB | Facility: HOSPITAL | Age: 43
End: 2023-01-25
Attending: FAMILY MEDICINE
Payer: COMMERCIAL

## 2023-01-25 ENCOUNTER — OFFICE VISIT (OUTPATIENT)
Dept: INTERNAL MEDICINE | Facility: CLINIC | Age: 43
End: 2023-01-25
Payer: COMMERCIAL

## 2023-01-25 VITALS
HEART RATE: 88 BPM | SYSTOLIC BLOOD PRESSURE: 139 MMHG | HEIGHT: 63 IN | WEIGHT: 171.75 LBS | OXYGEN SATURATION: 99 % | RESPIRATION RATE: 20 BRPM | DIASTOLIC BLOOD PRESSURE: 88 MMHG | BODY MASS INDEX: 30.43 KG/M2

## 2023-01-25 DIAGNOSIS — Z12.31 SCREENING MAMMOGRAM FOR HIGH-RISK PATIENT: ICD-10-CM

## 2023-01-25 DIAGNOSIS — Z00.00 ROUTINE GENERAL MEDICAL EXAMINATION AT A HEALTH CARE FACILITY: Primary | ICD-10-CM

## 2023-01-25 DIAGNOSIS — G43.719 INTRACTABLE CHRONIC MIGRAINE WITHOUT AURA AND WITHOUT STATUS MIGRAINOSUS: ICD-10-CM

## 2023-01-25 DIAGNOSIS — Z23 NEED FOR VACCINATION: Primary | ICD-10-CM

## 2023-01-25 DIAGNOSIS — E66.9 OBESITY (BMI 30.0-34.9): ICD-10-CM

## 2023-01-25 DIAGNOSIS — I10 ESSENTIAL HYPERTENSION: ICD-10-CM

## 2023-01-25 DIAGNOSIS — Z00.00 ROUTINE GENERAL MEDICAL EXAMINATION AT A HEALTH CARE FACILITY: ICD-10-CM

## 2023-01-25 DIAGNOSIS — J30.89 ENVIRONMENTAL AND SEASONAL ALLERGIES: ICD-10-CM

## 2023-01-25 DIAGNOSIS — M47.26 OSTEOARTHRITIS OF SPINE WITH RADICULOPATHY, LUMBAR REGION: ICD-10-CM

## 2023-01-25 PROBLEM — E66.811 OBESITY (BMI 30.0-34.9): Status: ACTIVE | Noted: 2023-01-25

## 2023-01-25 LAB
ALBUMIN SERPL BCP-MCNC: 4 G/DL (ref 3.5–5.2)
ALP SERPL-CCNC: 74 U/L (ref 55–135)
ALT SERPL W/O P-5'-P-CCNC: 28 U/L (ref 10–44)
ANION GAP SERPL CALC-SCNC: 10 MMOL/L (ref 8–16)
AST SERPL-CCNC: 20 U/L (ref 10–40)
BASOPHILS # BLD AUTO: 0.03 K/UL (ref 0–0.2)
BASOPHILS NFR BLD: 0.6 % (ref 0–1.9)
BILIRUB SERPL-MCNC: 0.4 MG/DL (ref 0.1–1)
BILIRUB UR QL STRIP: NEGATIVE
BUN SERPL-MCNC: 11 MG/DL (ref 6–20)
CALCIUM SERPL-MCNC: 10 MG/DL (ref 8.7–10.5)
CHLORIDE SERPL-SCNC: 103 MMOL/L (ref 95–110)
CHOLEST SERPL-MCNC: 166 MG/DL (ref 120–199)
CHOLEST/HDLC SERPL: 3 {RATIO} (ref 2–5)
CLARITY UR: CLEAR
CO2 SERPL-SCNC: 26 MMOL/L (ref 23–29)
COLOR UR: NORMAL
CREAT SERPL-MCNC: 1 MG/DL (ref 0.5–1.4)
DIFFERENTIAL METHOD: ABNORMAL
EOSINOPHIL # BLD AUTO: 0.2 K/UL (ref 0–0.5)
EOSINOPHIL NFR BLD: 3.8 % (ref 0–8)
ERYTHROCYTE [DISTWIDTH] IN BLOOD BY AUTOMATED COUNT: 12.7 % (ref 11.5–14.5)
EST. GFR  (NO RACE VARIABLE): >60 ML/MIN/1.73 M^2
ESTIMATED AVG GLUCOSE: 117 MG/DL (ref 68–131)
GLUCOSE SERPL-MCNC: 101 MG/DL (ref 70–110)
GLUCOSE UR QL STRIP: NEGATIVE
HBA1C MFR BLD: 5.7 % (ref 4–5.6)
HCT VFR BLD AUTO: 39.7 % (ref 37–48.5)
HDLC SERPL-MCNC: 55 MG/DL (ref 40–75)
HDLC SERPL: 33.1 % (ref 20–50)
HGB BLD-MCNC: 12.5 G/DL (ref 12–16)
HGB UR QL STRIP: NEGATIVE
IMM GRANULOCYTES # BLD AUTO: 0.01 K/UL (ref 0–0.04)
IMM GRANULOCYTES NFR BLD AUTO: 0.2 % (ref 0–0.5)
KETONES UR QL STRIP: NEGATIVE
LDLC SERPL CALC-MCNC: 99 MG/DL (ref 63–159)
LEUKOCYTE ESTERASE UR QL STRIP: NEGATIVE
LYMPHOCYTES # BLD AUTO: 1.9 K/UL (ref 1–4.8)
LYMPHOCYTES NFR BLD: 40.4 % (ref 18–48)
MCH RBC QN AUTO: 28.5 PG (ref 27–31)
MCHC RBC AUTO-ENTMCNC: 31.5 G/DL (ref 32–36)
MCV RBC AUTO: 90 FL (ref 82–98)
MONOCYTES # BLD AUTO: 0.4 K/UL (ref 0.3–1)
MONOCYTES NFR BLD: 8.2 % (ref 4–15)
NEUTROPHILS # BLD AUTO: 2.2 K/UL (ref 1.8–7.7)
NEUTROPHILS NFR BLD: 46.8 % (ref 38–73)
NITRITE UR QL STRIP: NEGATIVE
NONHDLC SERPL-MCNC: 111 MG/DL
NRBC BLD-RTO: 0 /100 WBC
PH UR STRIP: 6 [PH] (ref 5–8)
PLATELET # BLD AUTO: 278 K/UL (ref 150–450)
PMV BLD AUTO: 11.4 FL (ref 9.2–12.9)
POTASSIUM SERPL-SCNC: 4.4 MMOL/L (ref 3.5–5.1)
PROT SERPL-MCNC: 7.7 G/DL (ref 6–8.4)
PROT UR QL STRIP: NEGATIVE
RBC # BLD AUTO: 4.39 M/UL (ref 4–5.4)
SODIUM SERPL-SCNC: 139 MMOL/L (ref 136–145)
SP GR UR STRIP: 1.02 (ref 1–1.03)
TRIGL SERPL-MCNC: 60 MG/DL (ref 30–150)
URN SPEC COLLECT METH UR: NORMAL
UROBILINOGEN UR STRIP-ACNC: NEGATIVE EU/DL
WBC # BLD AUTO: 4.78 K/UL (ref 3.9–12.7)

## 2023-01-25 PROCEDURE — 90471 FLU VACCINE (QUAD) GREATER THAN OR EQUAL TO 3YO PRESERVATIVE FREE IM: ICD-10-PCS | Mod: S$GLB,,, | Performed by: FAMILY MEDICINE

## 2023-01-25 PROCEDURE — 83036 HEMOGLOBIN GLYCOSYLATED A1C: CPT | Performed by: FAMILY MEDICINE

## 2023-01-25 PROCEDURE — 80053 COMPREHEN METABOLIC PANEL: CPT | Performed by: FAMILY MEDICINE

## 2023-01-25 PROCEDURE — 1159F PR MEDICATION LIST DOCUMENTED IN MEDICAL RECORD: ICD-10-PCS | Mod: CPTII,S$GLB,, | Performed by: FAMILY MEDICINE

## 2023-01-25 PROCEDURE — 3079F DIAST BP 80-89 MM HG: CPT | Mod: CPTII,S$GLB,, | Performed by: FAMILY MEDICINE

## 2023-01-25 PROCEDURE — 99999 PR PBB SHADOW E&M-EST. PATIENT-LVL IV: CPT | Mod: PBBFAC,,, | Performed by: FAMILY MEDICINE

## 2023-01-25 PROCEDURE — 84443 ASSAY THYROID STIM HORMONE: CPT | Performed by: FAMILY MEDICINE

## 2023-01-25 PROCEDURE — 90471 IMMUNIZATION ADMIN: CPT | Mod: S$GLB,,, | Performed by: FAMILY MEDICINE

## 2023-01-25 PROCEDURE — 81003 URINALYSIS AUTO W/O SCOPE: CPT | Performed by: FAMILY MEDICINE

## 2023-01-25 PROCEDURE — 1159F MED LIST DOCD IN RCRD: CPT | Mod: CPTII,S$GLB,, | Performed by: FAMILY MEDICINE

## 2023-01-25 PROCEDURE — 85025 COMPLETE CBC W/AUTO DIFF WBC: CPT | Performed by: FAMILY MEDICINE

## 2023-01-25 PROCEDURE — 3008F BODY MASS INDEX DOCD: CPT | Mod: CPTII,S$GLB,, | Performed by: FAMILY MEDICINE

## 2023-01-25 PROCEDURE — 99999 PR PBB SHADOW E&M-EST. PATIENT-LVL IV: ICD-10-PCS | Mod: PBBFAC,,, | Performed by: FAMILY MEDICINE

## 2023-01-25 PROCEDURE — 36415 COLL VENOUS BLD VENIPUNCTURE: CPT | Performed by: FAMILY MEDICINE

## 2023-01-25 PROCEDURE — 99396 PREV VISIT EST AGE 40-64: CPT | Mod: S$GLB,,, | Performed by: FAMILY MEDICINE

## 2023-01-25 PROCEDURE — 3008F PR BODY MASS INDEX (BMI) DOCUMENTED: ICD-10-PCS | Mod: CPTII,S$GLB,, | Performed by: FAMILY MEDICINE

## 2023-01-25 PROCEDURE — 99213 PR OFFICE/OUTPT VISIT, EST, LEVL III, 20-29 MIN: ICD-10-PCS | Mod: 25,S$GLB,, | Performed by: FAMILY MEDICINE

## 2023-01-25 PROCEDURE — 1160F PR REVIEW ALL MEDS BY PRESCRIBER/CLIN PHARMACIST DOCUMENTED: ICD-10-PCS | Mod: CPTII,S$GLB,, | Performed by: FAMILY MEDICINE

## 2023-01-25 PROCEDURE — 1160F RVW MEDS BY RX/DR IN RCRD: CPT | Mod: CPTII,S$GLB,, | Performed by: FAMILY MEDICINE

## 2023-01-25 PROCEDURE — 3079F PR MOST RECENT DIASTOLIC BLOOD PRESSURE 80-89 MM HG: ICD-10-PCS | Mod: CPTII,S$GLB,, | Performed by: FAMILY MEDICINE

## 2023-01-25 PROCEDURE — 80061 LIPID PANEL: CPT | Performed by: FAMILY MEDICINE

## 2023-01-25 PROCEDURE — 3075F PR MOST RECENT SYSTOLIC BLOOD PRESS GE 130-139MM HG: ICD-10-PCS | Mod: CPTII,S$GLB,, | Performed by: FAMILY MEDICINE

## 2023-01-25 PROCEDURE — 99396 PR PREVENTIVE VISIT,EST,40-64: ICD-10-PCS | Mod: S$GLB,,, | Performed by: FAMILY MEDICINE

## 2023-01-25 PROCEDURE — 99213 OFFICE O/P EST LOW 20 MIN: CPT | Mod: 25,S$GLB,, | Performed by: FAMILY MEDICINE

## 2023-01-25 PROCEDURE — 90686 FLU VACCINE (QUAD) GREATER THAN OR EQUAL TO 3YO PRESERVATIVE FREE IM: ICD-10-PCS | Mod: S$GLB,,, | Performed by: FAMILY MEDICINE

## 2023-01-25 PROCEDURE — 3075F SYST BP GE 130 - 139MM HG: CPT | Mod: CPTII,S$GLB,, | Performed by: FAMILY MEDICINE

## 2023-01-25 PROCEDURE — 90686 IIV4 VACC NO PRSV 0.5 ML IM: CPT | Mod: S$GLB,,, | Performed by: FAMILY MEDICINE

## 2023-01-25 RX ORDER — NAPROXEN 500 MG/1
500 TABLET ORAL 2 TIMES DAILY PRN
Qty: 30 TABLET | Refills: 0 | Status: SHIPPED | OUTPATIENT
Start: 2023-01-25 | End: 2023-05-08

## 2023-01-25 RX ORDER — FLUTICASONE PROPIONATE 50 MCG
1 SPRAY, SUSPENSION (ML) NASAL DAILY
Qty: 16 G | Refills: 0 | Status: SHIPPED | OUTPATIENT
Start: 2023-01-25 | End: 2023-03-19 | Stop reason: SDUPTHER

## 2023-01-25 NOTE — PROGRESS NOTES
"Subjective:       Patient ID: Sarah Brown is a 42 y.o. female.    Chief Complaint: Annual Exam    42 year old  female patient with Patient Active Problem List:     Essential hypertension     Migraines     Osteoarthritis of spine with radiculopathy, lumbar region     Chronic idiopathic constipation     Obesity (BMI 30.0-34.9)    Here for routine annual physicals  Patient reports that she is been out of her blood pressure medication for the past few weeks and requesting refill  Has been having headaches off and on lately and reports sinus pressure for which patient started taking Coricidin, denies any fever with chills  Patient has been having chronic low back pain-up to 5/10 radiating to the right lower leg, denies any recent injury or trauma    Review of Systems   Constitutional:  Negative for fatigue and fever.   HENT:  Positive for congestion and sinus pressure.    Eyes:  Negative for visual disturbance.   Respiratory:  Negative for shortness of breath.    Cardiovascular:  Negative for chest pain and leg swelling.   Gastrointestinal:  Negative for abdominal pain, nausea and vomiting.   Musculoskeletal:  Positive for back pain and myalgias.   Skin:  Negative for rash.   Neurological:  Positive for numbness and headaches. Negative for weakness and light-headedness.   Psychiatric/Behavioral:  Negative for sleep disturbance.        /88 (BP Location: Left arm, Patient Position: Sitting, BP Method: Medium (Manual))   Pulse 88   Resp 20   Ht 5' 3" (1.6 m)   Wt 77.9 kg (171 lb 11.8 oz)   LMP 01/17/2023   SpO2 99%   BMI 30.42 kg/m²   Objective:      Physical Exam  Constitutional:       Appearance: She is well-developed.   HENT:      Head: Normocephalic and atraumatic.      Nose: Congestion present.   Cardiovascular:      Rate and Rhythm: Normal rate and regular rhythm.      Heart sounds: Normal heart sounds. No murmur heard.  Pulmonary:      Effort: Pulmonary effort is normal.      Breath " sounds: Normal breath sounds. No wheezing.   Abdominal:      General: Bowel sounds are normal.      Palpations: Abdomen is soft.      Tenderness: There is no abdominal tenderness.   Musculoskeletal:         General: Tenderness present.      Comments: Positive for paraspinal lumbar muscle tenderness noted on the right side   Lymphadenopathy:      Cervical: No cervical adenopathy.   Skin:     General: Skin is warm and dry.      Findings: No rash.   Neurological:      Mental Status: She is alert and oriented to person, place, and time.   Psychiatric:         Mood and Affect: Mood normal.         Assessment/Plan:   1. Routine general medical examination at a health care facility  -     Urinalysis, Reflex to Urine Culture Urine, Clean Catch; Future; Expected date: 01/25/2023  -     Hemoglobin A1C; Future; Expected date: 01/25/2023  -     TSH; Future; Expected date: 01/25/2023  -     Lipid Panel; Future; Expected date: 01/25/2023  -     Comprehensive Metabolic Panel; Future; Expected date: 01/25/2023  -     CBC Auto Differential; Future; Expected date: 01/25/2023  Vital signs stable today.  Clinical exam stable  Continue lifestyle modifications with low-fat and low-cholesterol diet and exercise 30 minutes daily    2. Essential hypertension  -     Urinalysis, Reflex to Urine Culture Urine, Clean Catch; Future; Expected date: 01/25/2023  -     TSH; Future; Expected date: 01/25/2023  -     Lipid Panel; Future; Expected date: 01/25/2023  -     Comprehensive Metabolic Panel; Future; Expected date: 01/25/2023  -     CBC Auto Differential; Future; Expected date: 01/25/2023  Blood pressure is stable currently on hydrochlorothiazide 25 mg and Procardia 60 mg twice-daily refill sent recently    3. Osteoarthritis of spine with radiculopathy, lumbar region  -     naproxen (NAPROSYN) 500 MG tablet; Take 1 tablet (500 mg total) by mouth 2 (two) times daily as needed.  Dispense: 30 tablet; Refill: 0  -     Ambulatory referral/consult to  Physical/Occupational Therapy; Future; Expected date: 02/01/2023  Graded exercise regimen recommended  Will refer to physical therapy  Naproxen prescribed today for symptomatic relief    4. Screening mammogram for high-risk patient  -     Mammo Digital Screening Bilat w/ Emeterio; Future; Expected date: 01/25/2023   Due for mammogram    5. Intractable chronic migraine without aura and without status migrainosus   stable    6. Obesity (BMI 30.0-34.9)  Lifestyle modifications recommended to lose weight with BMI 30    7. Environmental and seasonal allergies  -     fluticasone propionate (FLONASE) 50 mcg/actuation nasal spray; 1 spray (50 mcg total) by Each Nostril route once daily.  Dispense: 16 g; Refill: 0  Flonase prescribed today for symptomatic relief and patient to start taking over-the-counter Claritin or Zyrtec  Drink adequate fluids

## 2023-01-26 LAB — TSH SERPL DL<=0.005 MIU/L-ACNC: 1.38 UIU/ML (ref 0.4–4)

## 2023-01-27 ENCOUNTER — CLINICAL SUPPORT (OUTPATIENT)
Dept: REHABILITATION | Facility: HOSPITAL | Age: 43
End: 2023-01-27
Payer: COMMERCIAL

## 2023-01-27 DIAGNOSIS — R53.1 WEAKNESS: ICD-10-CM

## 2023-01-27 DIAGNOSIS — M54.41 CHRONIC BILATERAL LOW BACK PAIN WITH RIGHT-SIDED SCIATICA: ICD-10-CM

## 2023-01-27 DIAGNOSIS — G89.29 CHRONIC BILATERAL LOW BACK PAIN WITH RIGHT-SIDED SCIATICA: ICD-10-CM

## 2023-01-27 DIAGNOSIS — M47.26 OSTEOARTHRITIS OF SPINE WITH RADICULOPATHY, LUMBAR REGION: ICD-10-CM

## 2023-01-27 PROCEDURE — 97140 MANUAL THERAPY 1/> REGIONS: CPT | Mod: PN

## 2023-01-27 PROCEDURE — 97161 PT EVAL LOW COMPLEX 20 MIN: CPT | Mod: PN

## 2023-01-27 PROCEDURE — 97110 THERAPEUTIC EXERCISES: CPT | Mod: PN

## 2023-01-27 NOTE — PLAN OF CARE
[facility]  Physical Therapy Initial Evaluation    Date: 1/27/2023   Name: Sarah Brown  Clinic Number: [id number]    Therapy Diagnosis:   Encounter Diagnoses   Name Primary?    Osteoarthritis of spine with radiculopathy, lumbar region     Chronic bilateral low back pain with right-sided sciatica     Weakness       Physician: Jennifer Rose MD     Physician Orders: PT Eval and Treat  Medical Diagnosis from Referral: Osteoarthritis of spine with radiculopathy, lumbar region [M47.26]  Evaluation Date: 1/27/2023  Authorization Period Expiration: 1/25/2024  Plan of Care Expiration: 3/24/2023  Visit # / Visits authorized: 1/1  FOTO: 1/3    Precautions: Standard    Time In: 3:45 pm (patient late)  Time Out: 4:25 pm  Total Billable Time (timed & untimed codes): 40 minutes    SUBJECTIVE   Date of onset: chronic for many years with symptoms flaring again in the past year  History of current condition - Sarah is a 42 y.o. female whom reports chronic B low back pain and R lower extremity pain. Patient has been treated with physical therapy about two years ago with some improvement noted. Pt reports pain radiates along the front of her thigh down to her foot, and causes her to feel like her leg is very swollen. Patient states she cannot walk for long without limping due to pain intensity with ambulation. Sarah denies numbness or tingling.     Pain:  Current 5/10, worst 10/10, best 2/10   Location: low back/R lower extremity   Description: Aching, Burning, and Throbbing  Aggravating Factors: walking, bending, lifting, standing up prolonged   Easing Factors: hot bath, stretches, pressure to low back    Prior Therapy: Yes  Social History: Pt lives with their spouse  Occupation: Pt works for the [employer].   Prior Level of Function: Independent and pain free with all ADL, IADL, community mobility and functional activities.   Current Level of Function: Independent with all ADL, IADL, community  mobility and functional activities with reports of increased pain and need for increased time and frequent breaks.      Pts goals: Pt reported goals are to decrease overall pain levels in order to return to prior functional level.       Medical History:   Past Medical History:   Diagnosis Date    Hypertension     diet controled    Migraines     with aura, per pt       Surgical History:   Sarah Brown  has a past surgical history that includes Tubal ligation (9/26/14).    Medications:   Sarah has a current medication list which includes the following prescription(s): fluticasone propionate, hydrochlorothiazide, naproxen, and nifedipine.    Allergies:   Review of patient's allergies indicates:   Allergen Reactions    Gluten protein Itching        OBJECTIVE     RANGE OF MOTION:    Lumbar Right  (spine) Left   Pain/Dysfunction with Movement   Lumbar Flexion  100% --- Pulling pain    Lumbar Extension  100% --- Pain in low back    Lumbar Side Bending  75% 75% Pulling pain CL with bilateral motion     Hip PROM Screen in Supine: WNL     STRENGTH:    L/E MMT Right Left Pain/Dysfunction with Movement Goal   Hip Flexion  4/5 4/5 Pain at R leg and low back with R 4+/5 B   Hip Abduction  3+/5 NT Pain reproduction  4+/5 B   Knee Extension 4/5 4+/5 --- 5/5 B   Knee Flexion 4/5 4/5 Pain reproduction on R 5/5 B   Ankle DF 5/5 5/5 --- ---       MUSCLE LENGTH:     Muscle Tested  Right   Quadriceps decreased   Hamstrings  decreased   Piriformis  decreased     JOINT MOBILITY:     Joint Motion Tested Right  (spine)   Lumbar segmental CPA oscillations Relieving soreness     SPECIAL TESTS:     Right  (spine)   SLUMP  Negative   Femoral nerve tension test Negative     Sensation:  Sensation is intact to light touch in B lower extremities     Palpation: Increased tone and tenderness noted with palpation of R lumbar paraspinals , glutes, and TFL. Increased tenderness noted with palpation of  lumbar spinous processes.     Posture:  Pt  presents with postural abnormalities which include: increased lumbar lordosis    Gait Analysis: The patient ambulated with the following gait abnormalities slight limping on R lower extremity.     FUNCTION:     CMS Impairment/Limitation/Restriction for FOTO Lumbar Survey    Therapist reviewed FOTO scores for Sarah on 1/27/2023.   FOTO documents entered into [employer] - see Media section.    Limitation Score: 51%         TREATMENT   Total Treatment time separate from Evaluation: (20) minutes       Sarah received the following manual therapy techniques applied for (8) minutes, including:    Segmental  to lumbar vertebrae Grade I-II    Sarah received therapeutic exercises to develop strength, endurance, ROM, flexibility, posture, and core stabilization for (12) minutes including:    SKTC x 10 B   LTR x 10 B  +pt education and instruction on stretches     Sarah participated in neuromuscular re-education activities to improve: Balance, Coordination, Kinesthetic, Sense, Proprioception, and Posture for (0) minutes., including:      Sarah participated in dynamic functional therapeutic activities to improve functional performance for (0)  minutes, including:      Sarah participated in gait training to improve functional mobility and safety for (0)  minutes, including:      Home Exercises and Patient Education Provided    Education/Self-Care provided:  Patient educated on the impairments noted above and the effects of physical therapy intervention to improve overall condition and QOL.   Patient was educated on all the above exercise prior/during/after for proper posture, positioning, and execution for safe performance with home exercise program.   Patient educated on the importance of improved core and lower extremity strength in order to improve alignment of the spine and lower extremities with static positions and dynamic movement.       Written Home Exercises Provided: yes.  Exercises were reviewed and Sarah was able  "to demonstrate them prior to the end of the session.  Sarah demonstrated good understanding of the education provided.     See EMR under Patient Instructions for exercises provided 1/27/2023.    ASSESSMENT   Sarah is a 42 y.o. female referred to outpatient Physical Therapy with a medical diagnosis of lumbar osteoarthritis of spine with radiculopathy. Pt presents with impairments including: painful ROM, decreased strength, decreased muscle length, postural abnormalities, gait abnormalities, and decreased overall function.    Pt prognosis is Fair.   Pt will benefit from skilled outpatient Physical Therapy to address the deficits stated above and in the chart below, provide pt/family education, and to maximize pt's level of independence.     Plan of care discussed with patient: Yes  Pt's spiritual, cultural and educational needs considered and patient is agreeable to the plan of care and goals as stated below:     Anticipated Barriers for therapy: sedentary lifestyle and chronicity of condition    Medical Necessity is demonstrated by the following  History  Co-morbidities and personal factors that may impact the plan of care Co-morbidities:   HTN    Personal Factors:   lifestyle     low   Examination  Body Structures and Functions, activity limitations and participation restrictions that may impact the plan of care Body Regions:   back  lower extremities    Body Systems:    strength  gait  transfers  motor control    Participation Restrictions:   See above in "Current Level of Function"     Activity limitations:   Learning and applying knowledge  no deficits    General Tasks and Commands  no deficits    Communication  no deficits    Mobility  lifting and carrying objects  walking    Self care  no deficits    Domestic Life  shopping  cooking  doing house work (cleaning house, washing dishes, laundry)  assisting others    Interactions/Relationships  no deficits    Life Areas  no deficits    Community and Social " Life  community life  recreation and leisure         low   Clinical Presentation stable and uncomplicated low   Decision Making/ Complexity Score: low       GOALS:    Short Term Goals:  4 weeks Progress   1/27/2023   Pain: Pt will demonstrate improved pain by reports of less than or equal to 7/10 worst pain on the verbal rating scale in order to progress toward maximal functional ability and improve QOL. PC   Function: Patient will demonstrate improved function as indicated by a functional limitation score of less than or equal to 45 out of 100 on FOTO. PC   HEP: Patient will demonstrate independence with HEP in order to progress toward functional independence. PC     Long Term Goals:  8 weeks Progress  1/27/2023   Pain: Pt will demonstrate improved pain by reports of less than or equal to 4/10 worst pain on the verbal rating scale in order to progress toward maximal functional ability and improve QOL.   PC   Function: Patient will demonstrate improved function as indicated by a functional limitation score of less than or equal to 38 out of 100 on FOTO. PC   Strength: Patient will improve strength to stated goals, listed in objective measures above, in order to improve functional independence and quality of life. PC   Gait: Patient will demonstrate normalized gait mechanics with minimal compensation in order to return to PLOF. PC    PC= progressing/continue; PM= partially met;        DC= discontinue    PLAN   Plan of care Certification: 1/27/2023 to 3/24/2023     Outpatient Physical Therapy 2 times weekly for 8 weeks to include any combination of the following interventions: virtual visits, dry needling, modalities, electrical stimulation (IFC, Pre-Mod, Attended with Functional Dry Needling), Cervical/Lumbar Traction, Gait Training, Manual Therapy, Neuromuscular Re-ed, Patient Education, Self Care, Therapeutic Activites, and Therapeutic Exercise     Thank you for this referral.    These services are reasonable and  necessary for the conditions set forth above while under my care.    Evelyn Miller, PT

## 2023-01-31 ENCOUNTER — CLINICAL SUPPORT (OUTPATIENT)
Dept: REHABILITATION | Facility: HOSPITAL | Age: 43
End: 2023-01-31
Payer: COMMERCIAL

## 2023-01-31 DIAGNOSIS — R53.1 WEAKNESS: ICD-10-CM

## 2023-01-31 DIAGNOSIS — M54.41 CHRONIC BILATERAL LOW BACK PAIN WITH RIGHT-SIDED SCIATICA: Primary | ICD-10-CM

## 2023-01-31 DIAGNOSIS — G89.29 CHRONIC BILATERAL LOW BACK PAIN WITH RIGHT-SIDED SCIATICA: Primary | ICD-10-CM

## 2023-01-31 PROCEDURE — 97112 NEUROMUSCULAR REEDUCATION: CPT | Mod: PN

## 2023-01-31 PROCEDURE — 97110 THERAPEUTIC EXERCISES: CPT | Mod: PN

## 2023-01-31 PROCEDURE — 97140 MANUAL THERAPY 1/> REGIONS: CPT | Mod: PN

## 2023-01-31 PROCEDURE — 20561 NDL INSJ W/O NJX 3+ MUSC: CPT | Mod: PN

## 2023-01-31 NOTE — PROGRESS NOTES
"  Physical Therapy Daily Treatment Note     Name: Sarah Brown  Clinic Number: [id number]    Therapy Diagnosis:   Encounter Diagnoses   Name Primary?    Chronic bilateral low back pain with right-sided sciatica Yes    Weakness      Physician: Jennifer Rose MD    Visit Date: 1/31/2023    Physician Orders: PT Eval and Treat  Medical Diagnosis from Referral: Osteoarthritis of spine with radiculopathy, lumbar region [M47.26]  Evaluation Date: 1/27/2023  Authorization Period Expiration: 1/25/2024  Plan of Care Expiration: 3/24/2023  Visit # / Visits authorized: 1/1  FOTO: 1/3     Precautions: Standard    Time In: 3:05 pm  Time Out: 4:00 pm  Total Billable Time: 55 minutes    SUBJECTIVE     Today, pt reports: Increased pain yesterday at work without known cause.      She was compliant with home exercise program.  Response to previous treatment: n/a today  Functional change: n/a today     Pain: 5/10     Location: R hip/ low back    OBJECTIVE / TREATMENT     Objective measures to be updated at Updated POC unless specified otherwise.      Sarah received the following manual therapy techniques applied for (8) minutes, including:    STM and palpation to R hip muscles and lumbar paraspinals before and after FDN     Sarah received therapeutic exercises to develop strength, endurance, ROM, flexibility, posture, and core stabilization for (39)  minutes including:    SKTC 3 x 20" R   LTR x 8 B   Hip Adduction ball squeeze 5" holds 2 minutes   Hip Abduction belt press 5" holds 2 minutes  Seated nerve glides x 10 B     Sarah participated in neuromuscular re-education activities to improve: Balance, Coordination, Kinesthetic, Sense, Proprioception, and Posture for (8)  minutes., including:    PPT 2 x 8 5" holds   TA activation 2 x 10 5" holds     Sarah participated in dynamic functional therapeutic activities to improve functional performance for (00)  minutes, including:      Palpation Assessment to determine the necessity for " Functional Dry Needling to R hip rotators and gluts.   Patient provided written and verbal consent to Functional Dry Needling YES  Written Handout on response to FDN provided: yes    Sarah demonstrated good  understanding of the education provided.   Patient demonstrated appropriate response to Functional Dry Needling. YES.       After exs., pt rec'd functional dry needling today as follows: FDN to R lateral hip rotators using four 100 mm fusiform needles and to glut max and med using two 100 mm fusiform needles. Needling was done without estim and with patient instructing therapist on symptoms upon insertion to allow deeper insertion once referral pain was diminished. Needles were left in-situ for 8 minutes once deepest point in muscles were reached.     Home Exercises Provided and Patient Education Provided     Education/Self-Care provided:  Patient educated on biomechanical justification for therapeutic exercise and importance of compliance with HEP in order to improve overall impairments and QOL   Patient was educated on all the above exercise prior/during/after for proper posture, positioning, and execution for safe performance with home exercise program.    Patient educated on the importance of improved core and lower extremity strength in order to improve alignment of the spine and lower extremities with static positions and dynamic movement.     Written Home Exercises Provided: Patient instructed to cont prior HEP.  Exercises were reviewed and Sarah was able to demonstrate them prior to the end of the session.  Sarah demonstrated good  understanding of the education provided.     See EMR under Patient Instructions for exercises provided prior visit.    ASSESSMENT   Patient presents today with continued reported pain in the low back and R hip region. Therapeutic exercise done to encourage mobility and gentle strengthening of the affected region. Neuromuscular reeducation also done to correct postural  deficits and promote facilitation of inner core stabilizers. Patient has notable poor tolerance with increased discomfort though she participates. FDN initiation in a gentle manner was done to allow for tissue relaxation and decreased irritability. Patient has positive response reporting decreased pain to 3/10 by end of session.     Sarah is progressing well towards her goals.   Pt prognosis is Good.     Pt will continue to benefit from skilled outpatient physical therapy to address the deficits listed in the problem list box on initial evaluation, provide pt/family education and to maximize pt's level of independence in the home and community environment.     Pt's spiritual, cultural and educational needs considered and pt agreeable to plan of care and goals.     Anticipated Barriers for therapy: sedentary lifestyle and chronicity of condition    Short Term Goals:  4 weeks Progress   1/27/2023   Pain: Pt will demonstrate improved pain by reports of less than or equal to 7/10 worst pain on the verbal rating scale in order to progress toward maximal functional ability and improve QOL. PC   Function: Patient will demonstrate improved function as indicated by a functional limitation score of less than or equal to 45 out of 100 on FOTO. PC   HEP: Patient will demonstrate independence with HEP in order to progress toward functional independence. PC      Long Term Goals:  8 weeks Progress  1/27/2023   Pain: Pt will demonstrate improved pain by reports of less than or equal to 4/10 worst pain on the verbal rating scale in order to progress toward maximal functional ability and improve QOL.   PC   Function: Patient will demonstrate improved function as indicated by a functional limitation score of less than or equal to 38 out of 100 on FOTO. PC   Strength: Patient will improve strength to stated goals, listed in objective measures above, in order to improve functional independence and quality of life. PC   Gait: Patient  will demonstrate normalized gait mechanics with minimal compensation in order to return to PLOF. PC   Goals Key:  PC= progressing/continue; PM= partially met;        DC= discontinue    PLAN   Plan of care Certification: 1/27/2023 to 3/24/2023      Outpatient Physical Therapy 2 times weekly for 8 weeks to include any combination of the following interventions: virtual visits, dry needling, modalities, electrical stimulation (IFC, Pre-Mod, Attended with Functional Dry Needling), Cervical/Lumbar Traction, Gait Training, Manual Therapy, Neuromuscular Re-ed, Patient Education, Self Care, Therapeutic Activites, and Therapeutic Exercise      Continue Plan of Care (POC) and progress per patient tolerance. See treatment section for details on planned progressions next session.      Evelyn Miller PT,

## 2023-02-03 ENCOUNTER — CLINICAL SUPPORT (OUTPATIENT)
Dept: REHABILITATION | Facility: HOSPITAL | Age: 43
End: 2023-02-03
Payer: COMMERCIAL

## 2023-02-03 DIAGNOSIS — M54.41 CHRONIC BILATERAL LOW BACK PAIN WITH RIGHT-SIDED SCIATICA: Primary | ICD-10-CM

## 2023-02-03 DIAGNOSIS — R53.1 WEAKNESS: ICD-10-CM

## 2023-02-03 DIAGNOSIS — G89.29 CHRONIC BILATERAL LOW BACK PAIN WITH RIGHT-SIDED SCIATICA: Primary | ICD-10-CM

## 2023-02-03 PROCEDURE — 97140 MANUAL THERAPY 1/> REGIONS: CPT | Mod: PN

## 2023-02-03 PROCEDURE — 97110 THERAPEUTIC EXERCISES: CPT | Mod: PN

## 2023-02-03 PROCEDURE — 20561 NDL INSJ W/O NJX 3+ MUSC: CPT | Mod: PN

## 2023-02-03 PROCEDURE — 97112 NEUROMUSCULAR REEDUCATION: CPT | Mod: PN

## 2023-02-03 NOTE — PROGRESS NOTES
"  Physical Therapy Daily Treatment Note     Name: Sarah Brown  Clinic Number: [id number]    Therapy Diagnosis:   Encounter Diagnoses   Name Primary?    Chronic bilateral low back pain with right-sided sciatica Yes    Weakness        Physician: Jennifer Rose MD    Visit Date: 2/3/2023    Physician Orders: PT Eval and Treat  Medical Diagnosis from Referral: Osteoarthritis of spine with radiculopathy, lumbar region [M47.26]  Evaluation Date: 1/27/2023  Authorization Period Expiration: 1/25/2024  Plan of Care Expiration: 3/24/2023  Visit # / Visits authorized: 1/1  FOTO: 1/3     Precautions: Standard    Time In: 2:00 pm  Time Out: 3:00 pm  Total Billable Time: 60 minutes (10 minutes 1: 1 with trained technician)    SUBJECTIVE     Today, pt reports: pain has been better and more loose since last session. She feels less radiating pain down the leg.    She was compliant with home exercise program.  Response to previous treatment: n/a today  Functional change: n/a today     Pain: 3/10     Location: R hip/ low back    OBJECTIVE / TREATMENT     Objective measures to be updated at Updated POC unless specified otherwise.      Sarah received the following manual therapy techniques applied for (10) minutes, including:    STM and palpation to R hip muscles and lumbar paraspinals before and after FDN     Sarah received therapeutic exercises to develop strength, endurance, ROM, flexibility, posture, and core stabilization for (40)  minutes including:    SKTC 3 x 20" R   LTR x 10 B   Hip Adduction ball squeeze 5" holds 3 minutes   Hip Abduction belt press 5" holds 3 minutes  Seated nerve glides x 10 B   Cat/ cow leaning on plinth x 8  Bridges 2 x 5    Sarah participated in neuromuscular re-education activities to improve: Balance, Coordination, Kinesthetic, Sense, Proprioception, and Posture for (10)  minutes., including:    PPT 2 x 10 5" holds   TA activation 2 x 10 5" holds     Sarah participated in dynamic functional " therapeutic activities to improve functional performance for (00)  minutes, including:      Palpation Assessment to determine the necessity for Functional Dry Needling to R hip rotators and gluts.   Patient provided written and verbal consent to Functional Dry Needling YES  Written Handout on response to FDN provided: yes    Sarah demonstrated good  understanding of the education provided.   Patient demonstrated appropriate response to Functional Dry Needling. YES.       After exs., pt rec'd functional dry needling today as follows: FDN to R lateral hip rotators using four 100 mm fusiform needles and to B lumbar paraspinals using two 75 mm fusiform needles. Needling was done without estim and with patient instructing therapist on symptoms upon insertion to allow deeper insertion once referral pain was diminished. Needles were left in-situ for 5 minutes once deepest point in muscles were reached.     Home Exercises Provided and Patient Education Provided   R   Education/Self-Care provided:  Patient educated on biomechanical justification for therapeutic exercise and importance of compliance with HEP in order to improve overall impairments and QOL   Patient was educated on all the above exercise prior/during/after for proper posture, positioning, and execution for safe performance with home exercise program.    Patient educated on the importance of improved core and lower extremity strength in order to improve alignment of the spine and lower extremities with static positions and dynamic movement.     Written Home Exercises Provided: Patient instructed to cont prior HEP.  Exercises were reviewed and Sarah was able to demonstrate them prior to the end of the session.  Sarah demonstrated good  understanding of the education provided.     See EMR under Patient Instructions for exercises provided prior visit.    ASSESSMENT   Patient presents today reporting some centralization of pain. Therefore, session was slightly  progressed but remained gentle with strengthening and mobility training. Patient tolerates minor progression of session well. Dry needling treatment continued today due to success after last session. Will continue to progress patient per tolerance and tissue irritability.     Sarah is progressing well towards her goals.   Pt prognosis is Good.     Pt will continue to benefit from skilled outpatient physical therapy to address the deficits listed in the problem list box on initial evaluation, provide pt/family education and to maximize pt's level of independence in the home and community environment.     Pt's spiritual, cultural and educational needs considered and pt agreeable to plan of care and goals.     Anticipated Barriers for therapy: sedentary lifestyle and chronicity of condition    Short Term Goals:  4 weeks Progress   1/27/2023   Pain: Pt will demonstrate improved pain by reports of less than or equal to 7/10 worst pain on the verbal rating scale in order to progress toward maximal functional ability and improve QOL. PC   Function: Patient will demonstrate improved function as indicated by a functional limitation score of less than or equal to 45 out of 100 on FOTO. PC   HEP: Patient will demonstrate independence with HEP in order to progress toward functional independence. PC      Long Term Goals:  8 weeks Progress  1/27/2023   Pain: Pt will demonstrate improved pain by reports of less than or equal to 4/10 worst pain on the verbal rating scale in order to progress toward maximal functional ability and improve QOL.   PC   Function: Patient will demonstrate improved function as indicated by a functional limitation score of less than or equal to 38 out of 100 on FOTO. PC   Strength: Patient will improve strength to stated goals, listed in objective measures above, in order to improve functional independence and quality of life. PC   Gait: Patient will demonstrate normalized gait mechanics with minimal  compensation in order to return to PLOF. PC   Goals Key:  PC= progressing/continue; PM= partially met;        DC= discontinue    PLAN   Plan of care Certification: 1/27/2023 to 3/24/2023      Outpatient Physical Therapy 2 times weekly for 8 weeks to include any combination of the following interventions: virtual visits, dry needling, modalities, electrical stimulation (IFC, Pre-Mod, Attended with Functional Dry Needling), Cervical/Lumbar Traction, Gait Training, Manual Therapy, Neuromuscular Re-ed, Patient Education, Self Care, Therapeutic Activites, and Therapeutic Exercise      Continue Plan of Care (POC) and progress per patient tolerance. See treatment section for details on planned progressions next session.      Evelyn Miller PT,

## 2023-02-13 ENCOUNTER — DOCUMENTATION ONLY (OUTPATIENT)
Dept: REHABILITATION | Facility: HOSPITAL | Age: 43
End: 2023-02-13
Payer: COMMERCIAL

## 2023-02-13 ENCOUNTER — CLINICAL SUPPORT (OUTPATIENT)
Dept: REHABILITATION | Facility: HOSPITAL | Age: 43
End: 2023-02-13
Payer: COMMERCIAL

## 2023-02-13 DIAGNOSIS — M54.41 CHRONIC BILATERAL LOW BACK PAIN WITH RIGHT-SIDED SCIATICA: Primary | ICD-10-CM

## 2023-02-13 DIAGNOSIS — R53.1 WEAKNESS: ICD-10-CM

## 2023-02-13 DIAGNOSIS — G89.29 CHRONIC BILATERAL LOW BACK PAIN WITH RIGHT-SIDED SCIATICA: Primary | ICD-10-CM

## 2023-02-13 PROCEDURE — 97110 THERAPEUTIC EXERCISES: CPT | Mod: PN,CQ

## 2023-02-13 PROCEDURE — 97112 NEUROMUSCULAR REEDUCATION: CPT | Mod: PN,CQ

## 2023-02-13 PROCEDURE — 97014 ELECTRIC STIMULATION THERAPY: CPT | Mod: PN,CQ

## 2023-02-13 PROCEDURE — 97140 MANUAL THERAPY 1/> REGIONS: CPT | Mod: PN,CQ

## 2023-02-13 NOTE — PROGRESS NOTES
PT/PTA met face to face to discuss pt's treatment plan and progress towards established goals. Pt will be seen by a physical therapist minimally every 6th visit or every 30 days.    Roslyn Rosa PTA

## 2023-02-13 NOTE — PROGRESS NOTES
"  Physical Therapy Assistant Daily Treatment Note     Name: Sarah Brown  Clinic Number: [id number]    Therapy Diagnosis:   Encounter Diagnoses   Name Primary?    Chronic bilateral low back pain with right-sided sciatica Yes    Weakness          Physician: Jennifer Rose MD    Visit Date: 2/13/2023    Physician Orders: PT Eval and Treat  Medical Diagnosis from Referral: Osteoarthritis of spine with radiculopathy, lumbar region [M47.26]  Evaluation Date: 1/27/2023  Authorization Period Expiration: 12/31/2022  Plan of Care Expiration: 3/24/2023  Visit # / Visits authorized: 3/20  FOTO: 1/3     Precautions: Standard    Time In: 2:15 pm  Time Out: 3:10 pm  Total Billable Time: 55 minutes    SUBJECTIVE     Today, pt reports: continued pain from right sided low back shooting down anterior and posterior thigh to feet.    She was compliant with home exercise program.  Response to previous treatment: no change  Functional change: n/a today     Pain: 4/10    Location: R hip/ low back    OBJECTIVE / TREATMENT     Objective measures to be updated at Updated POC unless specified otherwise.      Sarah received the following manual therapy techniques applied for (10) minutes, including:    STM and palpation to R hip muscles and lumbar paraspinals    Sarah received therapeutic exercises to develop strength, endurance, ROM, flexibility, posture, and core stabilization for (25)  minutes including:    SKTC 3 x 20" R   LTR x 10 B   Piriformis stretch 3 x 20" B  Hip Adduction ball squeeze 5" holds 3 minutes --- pain following this exercise today  Hip Abduction belt press 5" holds 3 minutes  Cat/ cow in quadruped x 8  Hand heel rocks x15  Seated nerve glides x 10 B   Bridges 2 x 5-- not today    Sarah participated in neuromuscular re-education activities to improve: Balance, Coordination, Kinesthetic, Sense, Proprioception, and Posture for (10)  minutes., including:    PPT 2 x 10 5" holds   TA activation 2 x 10 5" holds     Sarah " participated in dynamic functional therapeutic activities to improve functional performance for (00)  minutes, including:      Palpation Assessment to determine the necessity for Functional Dry Needling to R hip rotators and gluts.   Patient provided written and verbal consent to Functional Dry Needling YES  Written Handout on response to FDN provided: yes    Sarah demonstrated good  understanding of the education provided.   Patient demonstrated appropriate response to Functional Dry Needling. YES.       After exs., pt rec'd functional dry needling today as follows: FDN to R lateral hip rotators using four 100 mm fusiform needles and to B lumbar paraspinals using two 75 mm fusiform needles. Needling was done without estim and with patient instructing therapist on symptoms upon insertion to allow deeper insertion once referral pain was diminished. Needles were left in-situ for 0 minutes once deepest point in muscles were reached.     supervised modalities after being cleared for contradictions: IFC Electrical Stimulation:  Sarah received IFC Electrical Stimulation for pain control applied to the low back. Pt received stimulation for 10 minutes. Sarah tolerated treatment well without any adverse effects.      hot pack for 10 minutes to low back.    Home Exercises Provided and Patient Education Provided     Education/Self-Care provided:  Patient educated on biomechanical justification for therapeutic exercise and importance of compliance with HEP in order to improve overall impairments and QOL   Patient was educated on all the above exercise prior/during/after for proper posture, positioning, and execution for safe performance with home exercise program.    Patient educated on the importance of improved core and lower extremity strength in order to improve alignment of the spine and lower extremities with static positions and dynamic movement.     Written Home Exercises Provided: Patient instructed to cont prior  HEP.  Exercises were reviewed and Sarah was able to demonstrate them prior to the end of the session.  Sarah demonstrated good  understanding of the education provided.     See EMR under Patient Instructions for exercises provided prior visit.    ASSESSMENT   Patient presents today with pain in right sided low back radiating down right lower extremity. Significant tenderness reported with soft tissue mobilization around psis and glutes. Patient has increased guarding noted with piriformis stretching so educated on self stretching to decrease tenderness. She continued with gentle strengthening and mobility training within tolerance but pain reported following hip isometric training. Patient demonstrates good transverse abdominus isolation with core strengthening exercises so attempted to progress to dynamic strengthening but she was unable to due to increased pain in low back. Unable to tolerate prone knee bends due to pain as well. Modalities applied at end of session for pain relief.  Will continue to progress patient per tolerance and tissue irritability.     Sarah is progressing well towards her goals.   Pt prognosis is Good.     Pt will continue to benefit from skilled outpatient physical therapy to address the deficits listed in the problem list box on initial evaluation, provide pt/family education and to maximize pt's level of independence in the home and community environment.     Pt's spiritual, cultural and educational needs considered and pt agreeable to plan of care and goals.     Anticipated Barriers for therapy: sedentary lifestyle and chronicity of condition    Short Term Goals:  4 weeks Progress   1/27/2023   Pain: Pt will demonstrate improved pain by reports of less than or equal to 7/10 worst pain on the verbal rating scale in order to progress toward maximal functional ability and improve QOL. PC   Function: Patient will demonstrate improved function as indicated by a functional limitation  score of less than or equal to 45 out of 100 on FOTO. PC   HEP: Patient will demonstrate independence with HEP in order to progress toward functional independence. PC      Long Term Goals:  8 weeks Progress  1/27/2023   Pain: Pt will demonstrate improved pain by reports of less than or equal to 4/10 worst pain on the verbal rating scale in order to progress toward maximal functional ability and improve QOL.   PC   Function: Patient will demonstrate improved function as indicated by a functional limitation score of less than or equal to 38 out of 100 on FOTO. PC   Strength: Patient will improve strength to stated goals, listed in objective measures above, in order to improve functional independence and quality of life. PC   Gait: Patient will demonstrate normalized gait mechanics with minimal compensation in order to return to PLOF. PC   Goals Key:  PC= progressing/continue; PM= partially met;        DC= discontinue    PLAN   Plan of care Certification: 1/27/2023 to 3/24/2023      Outpatient Physical Therapy 2 times weekly for 8 weeks to include any combination of the following interventions: virtual visits, dry needling, modalities, electrical stimulation (IFC, Pre-Mod, Attended with Functional Dry Needling), Cervical/Lumbar Traction, Gait Training, Manual Therapy, Neuromuscular Re-ed, Patient Education, Self Care, Therapeutic Activites, and Therapeutic Exercise      Continue Plan of Care (POC) and progress per patient tolerance. See treatment section for details on planned progressions next session.      Roslyn Rosa PTA,

## 2023-02-16 NOTE — PROGRESS NOTES
"  Physical Therapy Assistant Daily Treatment Note     Name: Sarah Brown  Clinic Number: [id number]    Therapy Diagnosis:   Encounter Diagnoses   Name Primary?    Chronic bilateral low back pain with right-sided sciatica Yes    Weakness        Physician: Jennifer Rose MD    Visit Date: 2/17/2023    Physician Orders: PT Eval and Treat  Medical Diagnosis from Referral: Osteoarthritis of spine with radiculopathy, lumbar region [M47.26]  Evaluation Date: 1/27/2023  Authorization Period Expiration: 12/31/2022  Plan of Care Expiration: 3/24/2023  Visit # / Visits authorized: 4/20  FOTO: 1/3    Precautions: Standard    Time In: 2:55 pm  Time Out: 3:40 pm  Total Billable Time: 45 minutes    SUBJECTIVE     Today, pt reports: she doesn't have any pain today.    She was compliant with home exercise program.  Response to previous treatment: decreased pain  Functional change: n/a today     Pain: 0/10    Location: R hip/ low back    OBJECTIVE / TREATMENT     Objective measures to be updated at Updated POC unless specified otherwise.    Sarah received the following manual therapy techniques applied for (0) minutes, including:    STM and palpation to R hip muscles and lumbar paraspinals    Sarah received therapeutic exercises to develop strength, endurance, ROM, flexibility, posture, and core stabilization for (30)  minutes including:    SKTC 3 x 20" B  LTR x 10 B   Nustep 5' for joint nutrition and mobility  Cat/ cow in quadruped x 10  Hand heel rocks x15  Supine nerve glides x 10 B   Bridges 2 x 5  Piriformis stretch 3 x 20" B    Sarah participated in neuromuscular re-education activities to improve: Balance, Coordination, Kinesthetic, Sense, Proprioception, and Posture for (15)  minutes., including:    PPT 2 x 10 5" holds   TA activation 2 x 10 5" holds   + bent knee fall out x10 BLE  + march x10 BLE  Supine clams GTB 2x10    Sarah participated in dynamic functional therapeutic activities to improve functional performance for " (00)  minutes, including:      Palpation Assessment to determine the necessity for Functional Dry Needling to R hip rotators and gluts.   Patient provided written and verbal consent to Functional Dry Needling YES  Written Handout on response to FDN provided: yes    Sarah demonstrated good  understanding of the education provided.   Patient demonstrated appropriate response to Functional Dry Needling. YES.       After exs., pt rec'd functional dry needling today as follows: FDN to R lateral hip rotators using four 100 mm fusiform needles and to B lumbar paraspinals using two 75 mm fusiform needles. Needling was done without estim and with patient instructing therapist on symptoms upon insertion to allow deeper insertion once referral pain was diminished. Needles were left in-situ for 0 minutes once deepest point in muscles were reached.     supervised modalities after being cleared for contradictions: IFC Electrical Stimulation:  Sarah received IFC Electrical Stimulation for pain control applied to the low back. Pt received stimulation for 0 minutes. Sarah tolerated treatment well without any adverse effects.      hot pack for 0 minutes to low back.    Home Exercises Provided and Patient Education Provided     Education/Self-Care provided:  Patient educated on biomechanical justification for therapeutic exercise and importance of compliance with HEP in order to improve overall impairments and QOL   Patient was educated on all the above exercise prior/during/after for proper posture, positioning, and execution for safe performance with home exercise program.    Patient educated on the importance of improved core and lower extremity strength in order to improve alignment of the spine and lower extremities with static positions and dynamic movement.     Written Home Exercises Provided: Patient instructed to cont prior HEP.  Exercises were reviewed and Sarah was able to demonstrate them prior to the end of the  session.  Sarah demonstrated good  understanding of the education provided.     See EMR under Patient Instructions for exercises provided prior visit.    ASSESSMENT   Patient presents today with no complaints of pain although continued tightness noted in piriformis. Continued gentle mobility, stretching and neural glides within tolerance. Unable to tolerate figure four stretch due to increased tightness. Patient demonstrates good transverse abdominus isolation so core strengthening exercises progressed to dynamic strengthening with improved tolerance today. Attempted side lying clams but patient was limited when side lying on right side so it was initiated in supine today.  Will continue to progress patient per tolerance and tissue irritability.     Sarah is progressing well towards her goals.   Pt prognosis is Good.     Pt will continue to benefit from skilled outpatient physical therapy to address the deficits listed in the problem list box on initial evaluation, provide pt/family education and to maximize pt's level of independence in the home and community environment.     Pt's spiritual, cultural and educational needs considered and pt agreeable to plan of care and goals.     Anticipated Barriers for therapy: sedentary lifestyle and chronicity of condition      Short Term Goals:  4 weeks Progress   1/27/2023   Pain: Pt will demonstrate improved pain by reports of less than or equal to 7/10 worst pain on the verbal rating scale in order to progress toward maximal functional ability and improve QOL. PC   Function: Patient will demonstrate improved function as indicated by a functional limitation score of less than or equal to 45 out of 100 on FOTO. PC   HEP: Patient will demonstrate independence with HEP in order to progress toward functional independence. PC      Long Term Goals:  8 weeks Progress  1/27/2023   Pain: Pt will demonstrate improved pain by reports of less than or equal to 4/10 worst pain on the  verbal rating scale in order to progress toward maximal functional ability and improve QOL.   PC   Function: Patient will demonstrate improved function as indicated by a functional limitation score of less than or equal to 38 out of 100 on FOTO. PC   Strength: Patient will improve strength to stated goals, listed in objective measures above, in order to improve functional independence and quality of life. PC   Gait: Patient will demonstrate normalized gait mechanics with minimal compensation in order to return to PLOF. PC   Goals Key:  PC= progressing/continue; PM= partially met;        DC= discontinue    PLAN   Plan of care Certification: 1/27/2023 to 3/24/2023      Outpatient Physical Therapy 2 times weekly for 8 weeks to include any combination of the following interventions: virtual visits, dry needling, modalities, electrical stimulation (IFC, Pre-Mod, Attended with Functional Dry Needling), Cervical/Lumbar Traction, Gait Training, Manual Therapy, Neuromuscular Re-ed, Patient Education, Self Care, Therapeutic Activites, and Therapeutic Exercise      Continue Plan of Care (POC) and progress per patient tolerance. See treatment section for details on planned progressions next session.    Roslyn Rosa PTA,

## 2023-02-17 ENCOUNTER — CLINICAL SUPPORT (OUTPATIENT)
Dept: REHABILITATION | Facility: HOSPITAL | Age: 43
End: 2023-02-17
Payer: COMMERCIAL

## 2023-02-17 DIAGNOSIS — G89.29 CHRONIC BILATERAL LOW BACK PAIN WITH RIGHT-SIDED SCIATICA: Primary | ICD-10-CM

## 2023-02-17 DIAGNOSIS — M54.41 CHRONIC BILATERAL LOW BACK PAIN WITH RIGHT-SIDED SCIATICA: Primary | ICD-10-CM

## 2023-02-17 DIAGNOSIS — R53.1 WEAKNESS: ICD-10-CM

## 2023-02-17 PROCEDURE — 97110 THERAPEUTIC EXERCISES: CPT | Mod: PN,CQ

## 2023-02-17 PROCEDURE — 97112 NEUROMUSCULAR REEDUCATION: CPT | Mod: PN,CQ

## 2023-02-20 ENCOUNTER — CLINICAL SUPPORT (OUTPATIENT)
Dept: REHABILITATION | Facility: HOSPITAL | Age: 43
End: 2023-02-20
Payer: COMMERCIAL

## 2023-02-20 DIAGNOSIS — M54.41 CHRONIC BILATERAL LOW BACK PAIN WITH RIGHT-SIDED SCIATICA: Primary | ICD-10-CM

## 2023-02-20 DIAGNOSIS — G89.29 CHRONIC BILATERAL LOW BACK PAIN WITH RIGHT-SIDED SCIATICA: Primary | ICD-10-CM

## 2023-02-20 DIAGNOSIS — R53.1 WEAKNESS: ICD-10-CM

## 2023-02-20 PROCEDURE — 97530 THERAPEUTIC ACTIVITIES: CPT | Mod: PN,CQ

## 2023-02-20 PROCEDURE — 97112 NEUROMUSCULAR REEDUCATION: CPT | Mod: PN,CQ

## 2023-02-20 PROCEDURE — 97110 THERAPEUTIC EXERCISES: CPT | Mod: PN,CQ

## 2023-02-20 NOTE — PROGRESS NOTES
"  Physical Therapy Assistant Daily Treatment Note     Name: Sarah Brown  Clinic Number: [id number]    Therapy Diagnosis:   Encounter Diagnoses   Name Primary?    Chronic bilateral low back pain with right-sided sciatica Yes    Weakness          Physician: Jennifer Rose MD    Visit Date: 2/20/2023    Physician Orders: PT Eval and Treat  Medical Diagnosis from Referral: Osteoarthritis of spine with radiculopathy, lumbar region [M47.26]  Evaluation Date: 1/27/2023  Authorization Period Expiration: 12/31/2022  Plan of Care Expiration: 3/24/2023  Visit # / Visits authorized: 5/20  FOTO: 2/3     CMS Impairment/Limitation/Restriction for FOTO Lumbar Survey     Therapist reviewed FOTO scores for Sarah on 2/20/2023.   FOTO documents entered into EP[employer] - see Media section.     Limitation Score: 31%        Precautions: Standard    Time In: 2:15 pm  Time Out: 3:00 pm  Total Billable Time: 45 minutes    SUBJECTIVE     Today, pt reports: she can walk without pain today    She was compliant with home exercise program.  Response to previous treatment: decreased pain  Functional change: n/a today     Pain: 0/10    Location: R hip/ low back    OBJECTIVE / TREATMENT     Objective measures to be updated at Updated POC unless specified otherwise.    Sarah received the following manual therapy techniques applied for (0) minutes, including:    STM and palpation to R hip muscles and lumbar paraspinals    Sarah received therapeutic exercises to develop strength, endurance, ROM, flexibility, posture, and core stabilization for (25)  minutes including:    Nustep 5' for joint nutrition and mobility  SKTC 3 x 20" B  LTR x 15 B   Piriformis stretch 3 x 20" B  Bridges with ball squeeze 2 x 10  Hand heel rocks x10  Supine nerve glides x 10 B     Sarah participated in neuromuscular re-education activities to improve: Balance, Coordination, Kinesthetic, Sense, Proprioception, and Posture for (10)  minutes., including:    Cat/ cow in quadruped " Please schedule left L5-S1 transforaminal selective nerve root injection.   "with focus on breathing x 10  PPT 2 x 10 5" holds   Seated TA activation 2 x 10 5" holds   Paloff Press double yellow theraband 2x10    Deferred:  + bent knee fall out x10 BLE  + march x10 BLE  Supine clams GTB 2x10    Sarah participated in dynamic functional therapeutic activities to improve functional performance for (10)  minutes, including:  Farmer's carry 15# KB  Sit to Stand 20 inch box 2x10  Dead lift 20# KB 2x10    Palpation Assessment to determine the necessity for Functional Dry Needling to R hip rotators and gluts.   Patient provided written and verbal consent to Functional Dry Needling YES  Written Handout on response to FDN provided: yes    Sarah demonstrated good  understanding of the education provided.   Patient demonstrated appropriate response to Functional Dry Needling. YES.       After exs., pt rec'd functional dry needling today as follows: FDN to R lateral hip rotators using four 100 mm fusiform needles and to B lumbar paraspinals using two 75 mm fusiform needles. Needling was done without estim and with patient instructing therapist on symptoms upon insertion to allow deeper insertion once referral pain was diminished. Needles were left in-situ for 0 minutes once deepest point in muscles were reached.     supervised modalities after being cleared for contradictions: IFC Electrical Stimulation:  Sarah received IFC Electrical Stimulation for pain control applied to the low back. Pt received stimulation for 0 minutes. Sarah tolerated treatment well without any adverse effects.      hot pack for 0 minutes to low back.    Home Exercises Provided and Patient Education Provided     Education/Self-Care provided:  Patient educated on biomechanical justification for therapeutic exercise and importance of compliance with HEP in order to improve overall impairments and QOL   Patient was educated on all the above exercise prior/during/after for proper posture, positioning, and execution for safe " performance with home exercise program.    Patient educated on the importance of improved core and lower extremity strength in order to improve alignment of the spine and lower extremities with static positions and dynamic movement.     Written Home Exercises Provided: Patient instructed to cont prior HEP.  Exercises were reviewed and Sarah was able to demonstrate them prior to the end of the session.  Sarah demonstrated good  understanding of the education provided.     See EMR under Patient Instructions for exercises provided prior visit.    ASSESSMENT   Patient presents today with no complaints of pain. Patient has made significant improvement with functional mobility since evaluation. Patient was able to progress with more functional tasks with good tolerance although increased fatigue noted. She was limited with repetitions due to fatigue.    Sarah is progressing well towards her goals.   Pt prognosis is Good.     Pt will continue to benefit from skilled outpatient physical therapy to address the deficits listed in the problem list box on initial evaluation, provide pt/family education and to maximize pt's level of independence in the home and community environment.     Pt's spiritual, cultural and educational needs considered and pt agreeable to plan of care and goals.     Anticipated Barriers for therapy: sedentary lifestyle and chronicity of condition    Short Term Goals:  4 weeks Progress   1/27/2023   Pain: Pt will demonstrate improved pain by reports of less than or equal to 7/10 worst pain on the verbal rating scale in order to progress toward maximal functional ability and improve QOL. PC   Function: Patient will demonstrate improved function as indicated by a functional limitation score of less than or equal to 45 out of 100 on FOTO. PC   HEP: Patient will demonstrate independence with HEP in order to progress toward functional independence. PC      Long Term Goals:  8 weeks Progress  1/27/2023    Pain: Pt will demonstrate improved pain by reports of less than or equal to 4/10 worst pain on the verbal rating scale in order to progress toward maximal functional ability and improve QOL.   PC   Function: Patient will demonstrate improved function as indicated by a functional limitation score of less than or equal to 38 out of 100 on FOTO. PC   Strength: Patient will improve strength to stated goals, listed in objective measures above, in order to improve functional independence and quality of life. PC   Gait: Patient will demonstrate normalized gait mechanics with minimal compensation in order to return to PLOF. PC   Goals Key:  PC= progressing/continue; PM= partially met;        DC= discontinue    PLAN   Plan of care Certification: 1/27/2023 to 3/24/2023      Outpatient Physical Therapy 2 times weekly for 8 weeks to include any combination of the following interventions: virtual visits, dry needling, modalities, electrical stimulation (IFC, Pre-Mod, Attended with Functional Dry Needling), Cervical/Lumbar Traction, Gait Training, Manual Therapy, Neuromuscular Re-ed, Patient Education, Self Care, Therapeutic Activites, and Therapeutic Exercise      Continue Plan of Care (POC) and progress per patient tolerance. See treatment section for details on planned progressions next session.    Roslyn Rosa PTA,

## 2023-02-22 ENCOUNTER — CLINICAL SUPPORT (OUTPATIENT)
Dept: REHABILITATION | Facility: HOSPITAL | Age: 43
End: 2023-02-22
Payer: COMMERCIAL

## 2023-02-22 DIAGNOSIS — G89.29 CHRONIC BILATERAL LOW BACK PAIN WITH RIGHT-SIDED SCIATICA: Primary | ICD-10-CM

## 2023-02-22 DIAGNOSIS — M54.41 CHRONIC BILATERAL LOW BACK PAIN WITH RIGHT-SIDED SCIATICA: Primary | ICD-10-CM

## 2023-02-22 DIAGNOSIS — R53.1 WEAKNESS: ICD-10-CM

## 2023-02-22 PROCEDURE — 97112 NEUROMUSCULAR REEDUCATION: CPT | Mod: PN

## 2023-02-22 PROCEDURE — 97110 THERAPEUTIC EXERCISES: CPT | Mod: PN

## 2023-02-22 PROCEDURE — 97530 THERAPEUTIC ACTIVITIES: CPT | Mod: PN

## 2023-02-22 NOTE — PROGRESS NOTES
"  Physical Therapy  Daily Treatment Note     Name: Sarah Brown  Clinic Number: [id number]    Therapy Diagnosis:   Encounter Diagnoses   Name Primary?    Chronic bilateral low back pain with right-sided sciatica Yes    Weakness            Physician: Jennifer Rose MD    Visit Date: 2/22/2023    Physician Orders: PT Eval and Treat  Medical Diagnosis from Referral: Osteoarthritis of spine with radiculopathy, lumbar region [M47.26]  Evaluation Date: 1/27/2023  Authorization Period Expiration: 12/31/2022  Plan of Care Expiration: 3/24/2023  Visit # / Visits authorized: 6/20  FOTO: 2/3    Precautions: Standard    Time In: 4:55 pm ( patient late arrival)   Time Out: 5:35 pm  Total Billable Time: 40 minutes    SUBJECTIVE     Today, pt reports: she has not had any pain in the last few days. Tension remains but she only notices it when stretching.    She was compliant with home exercise program.  Response to previous treatment: decreased pain  Functional change: n/a today     Pain: 0/10    Location: R hip/ low back    OBJECTIVE / TREATMENT     Objective measures to be updated at Updated POC unless specified otherwise.    Sarah received the following manual therapy techniques applied for (0) minutes, including:    STM and palpation to R hip muscles and lumbar paraspinals    Sarah received therapeutic exercises to develop strength, endurance, ROM, flexibility, posture, and core stabilization for (24)  minutes including:    SKTC 3 x 20" B  LTR x 15 B   Piriformis stretch 3 x 20" B  Bridges with ball squeeze 2 x 10  Hand heel rocks x10  Supine nerve glides x 10 B     Sarah participated in neuromuscular re-education activities to improve: Balance, Coordination, Kinesthetic, Sense, Proprioception, and Posture for (8)  minutes., including:    Cat/ cow in quadruped with focus on breathing x 10  PPT 2 x 10 5" holds   Seated TA activation 2 x 10 5" holds   Paloff Press double yellow theraband 2x10    Deferred:  + bent knee fall out " x10 BLE  + march x10 BLE  Supine clams GTB 2x10    Sarah participated in dynamic functional therapeutic activities to improve functional performance for (8)  minutes, including:  Farmer's carry 15# KB  Sit to Stand 20 inch box 2x10  Dead lift 20# KB 2x10    NOT DONE TODAY  Palpation Assessment to determine the necessity for Functional Dry Needling to R hip rotators and gluts.   Patient provided written and verbal consent to Functional Dry Needling YES  Written Handout on response to FDN provided: yes    Sarah demonstrated good  understanding of the education provided.   Patient demonstrated appropriate response to Functional Dry Needling. YES.       After exs., pt rec'd functional dry needling today as follows: FDN to R lateral hip rotators using four 100 mm fusiform needles and to B lumbar paraspinals using two 75 mm fusiform needles. Needling was done without estim and with patient instructing therapist on symptoms upon insertion to allow deeper insertion once referral pain was diminished. Needles were left in-situ for 0 minutes once deepest point in muscles were reached.     supervised modalities after being cleared for contradictions: IFC Electrical Stimulation:  Sarah received IFC Electrical Stimulation for pain control applied to the low back. Pt received stimulation for 0 minutes. Sarah tolerated treatment well without any adverse effects.      hot pack for 0 minutes to low back.    Home Exercises Provided and Patient Education Provided     Education/Self-Care provided:  Patient educated on biomechanical justification for therapeutic exercise and importance of compliance with HEP in order to improve overall impairments and QOL   Patient was educated on all the above exercise prior/during/after for proper posture, positioning, and execution for safe performance with home exercise program.    Patient educated on the importance of improved core and lower extremity strength in order to improve alignment of  the spine and lower extremities with static positions and dynamic movement.     Written Home Exercises Provided: Patient instructed to cont prior HEP.  Exercises were reviewed and Sarah was able to demonstrate them prior to the end of the session.  Sarah demonstrated good  understanding of the education provided.     See EMR under Patient Instructions for exercises provided prior visit.    ASSESSMENT   Patient presents today with continued improvement in symptoms. Therefore, continued with progressed treatment plan to address lacking stability and strength affecting functional tasks. Pt tolerates well but cues are are required to prevent trunk compensations. Significant fatigue is noted with limited endurance during exercises.     Sarah is progressing well towards her goals.   Pt prognosis is Good.     Pt will continue to benefit from skilled outpatient physical therapy to address the deficits listed in the problem list box on initial evaluation, provide pt/family education and to maximize pt's level of independence in the home and community environment.     Pt's spiritual, cultural and educational needs considered and pt agreeable to plan of care and goals.     Anticipated Barriers for therapy: sedentary lifestyle and chronicity of condition    Short Term Goals:  4 weeks Progress   1/27/2023   Pain: Pt will demonstrate improved pain by reports of less than or equal to 7/10 worst pain on the verbal rating scale in order to progress toward maximal functional ability and improve QOL. PC   Function: Patient will demonstrate improved function as indicated by a functional limitation score of less than or equal to 45 out of 100 on FOTO. PC   HEP: Patient will demonstrate independence with HEP in order to progress toward functional independence. PC      Long Term Goals:  8 weeks Progress  1/27/2023   Pain: Pt will demonstrate improved pain by reports of less than or equal to 4/10 worst pain on the verbal rating scale in  order to progress toward maximal functional ability and improve QOL.   PC   Function: Patient will demonstrate improved function as indicated by a functional limitation score of less than or equal to 38 out of 100 on FOTO. PC   Strength: Patient will improve strength to stated goals, listed in objective measures above, in order to improve functional independence and quality of life. PC   Gait: Patient will demonstrate normalized gait mechanics with minimal compensation in order to return to PLOF. PC   Goals Key:  PC= progressing/continue; PM= partially met;        DC= discontinue    PLAN   Plan of care Certification: 1/27/2023 to 3/24/2023      Outpatient Physical Therapy 2 times weekly for 8 weeks to include any combination of the following interventions: virtual visits, dry needling, modalities, electrical stimulation (IFC, Pre-Mod, Attended with Functional Dry Needling), Cervical/Lumbar Traction, Gait Training, Manual Therapy, Neuromuscular Re-ed, Patient Education, Self Care, Therapeutic Activites, and Therapeutic Exercise      Continue Plan of Care (POC) and progress per patient tolerance. See treatment section for details on planned progressions next session.    Evelyn Miller, PT,

## 2023-02-27 ENCOUNTER — CLINICAL SUPPORT (OUTPATIENT)
Dept: REHABILITATION | Facility: HOSPITAL | Age: 43
End: 2023-02-27
Payer: COMMERCIAL

## 2023-02-27 DIAGNOSIS — R53.1 WEAKNESS: ICD-10-CM

## 2023-02-27 DIAGNOSIS — M54.41 CHRONIC BILATERAL LOW BACK PAIN WITH RIGHT-SIDED SCIATICA: Primary | ICD-10-CM

## 2023-02-27 DIAGNOSIS — G89.29 CHRONIC BILATERAL LOW BACK PAIN WITH RIGHT-SIDED SCIATICA: Primary | ICD-10-CM

## 2023-02-27 PROCEDURE — 97110 THERAPEUTIC EXERCISES: CPT | Mod: PN

## 2023-02-27 PROCEDURE — 97140 MANUAL THERAPY 1/> REGIONS: CPT | Mod: PN

## 2023-02-27 PROCEDURE — 97112 NEUROMUSCULAR REEDUCATION: CPT | Mod: PN

## 2023-02-27 NOTE — PROGRESS NOTES
"  Physical Therapy  Daily Treatment Note     Name: Sarah Brown  Clinic Number: [id number]    Therapy Diagnosis:   Encounter Diagnoses   Name Primary?    Chronic bilateral low back pain with right-sided sciatica Yes    Weakness            Physician: Jennifer Rose MD    Visit Date: 2/27/2023    Physician Orders: PT Eval and Treat  Medical Diagnosis from Referral: Osteoarthritis of spine with radiculopathy, lumbar region [M47.26]  Evaluation Date: 1/27/2023  Authorization Period Expiration: 12/31/2022  Plan of Care Expiration: 3/24/2023  Visit # / Visits authorized: 7/20  FOTO: 2/3    Precautions: Standard    Time In: 2:20 pm   Time Out: 3:10 pm   Total Billable Time: 50 minutes    SUBJECTIVE     Today, pt reports: she has had some increased tension was noted in R low back causing difficulty laying down over the weekend after starting to walk neighborhood again.     She was compliant with home exercise program.  Response to previous treatment: decreased pain  Functional change: n/a today     Pain: 0/10    Location: R hip/ low back    OBJECTIVE / TREATMENT     Objective measures to be updated at Updated POC unless specified otherwise.    Sarah received the following manual therapy techniques applied for (10) minutes, including:    STM and palpation to R hip muscles and lumbar paraspinals  Gentle hip P-A mobilization in sidelying     Sarah received therapeutic exercises to develop strength, endurance, ROM, flexibility, posture, and core stabilization for (25)  minutes including:    SKTC 3 x 20" B  LTR x 15 B   Piriformis stretch 3 x 20" B  Bridges with ball squeeze 2 x 10  Mod prayer stretch leaning on high mat x 5   Mod IT band stretch straight leg crossover x 5   Supine nerve glides x 10     Sarah participated in neuromuscular re-education activities to improve: Balance, Coordination, Kinesthetic, Sense, Proprioception, and Posture for (15)  minutes., including:    Supine diaphragmatic breathing 3 minutes focus on " "lumbar muscle relaxation and core activation on exhale    PPT minimal motion x 10 5" holds   Supine TA activation 2 x 10 5" holds     Deferred:  + bent knee fall out x10 BLE  + march x10 BLE  Supine clams GTB 2x10  Cat/ cow in quadruped with focus on breathing x 10  Paloff Press double yellow theraband 2x10    Sarah participated in dynamic functional therapeutic activities to improve functional performance for (00)  minutes, including:  Farmer's carry 15# KB  Sit to Stand 20 inch box 2x10  Dead lift 20# KB 2x10    NOT DONE TODAY  Palpation Assessment to determine the necessity for Functional Dry Needling to R hip rotators and gluts.   Patient provided written and verbal consent to Functional Dry Needling YES  Written Handout on response to FDN provided: yes    Sarah demonstrated good  understanding of the education provided.   Patient demonstrated appropriate response to Functional Dry Needling. YES.    After exs., pt rec'd functional dry needling today as follows: FDN to R lateral hip rotators using four 100 mm fusiform needles and to B lumbar paraspinals using two 75 mm fusiform needles. Needling was done without estim and with patient instructing therapist on symptoms upon insertion to allow deeper insertion once referral pain was diminished. Needles were left in-situ for 0 minutes once deepest point in muscles were reached.     supervised modalities after being cleared for contradictions: IFC Electrical Stimulation:  Sarah received IFC Electrical Stimulation with hot pack for pain control applied to the low back following treatment. Pt received stimulation for 00 minutes. Sarah tolerated treatment well without any adverse effects.      hot pack for 10 minutes to low back.    Home Exercises Provided and Patient Education Provided     Education/Self-Care provided:  Patient educated on biomechanical justification for therapeutic exercise and importance of compliance with HEP in order to improve overall " impairments and QOL   Patient was educated on all the above exercise prior/during/after for proper posture, positioning, and execution for safe performance with home exercise program.    Patient educated on the importance of improved core and lower extremity strength in order to improve alignment of the spine and lower extremities with static positions and dynamic movement.     Written Home Exercises Provided: Patient instructed to cont prior HEP.  Exercises were reviewed and Sarah was able to demonstrate them prior to the end of the session.  Sarah demonstrated good  understanding of the education provided.     See EMR under Patient Instructions for exercises provided prior visit.    ASSESSMENT   Patient notes tension in R low back and hip after increased activity over the weekend presenting today. Session was started with manual therapy to address this tension. Tension was made worse by treatment and patient became further inflamed due to unforseen new level of irritability. Remainder of session was, therefore, decreased in intensity. Treatment focused on gentle stretching and tissue tolerance of mobility, along with core activation and relaxation exercises to relax system. Pt tolerates well with pain decreased by the time session was finished.     Sarah is progressing well towards her goals.   Pt prognosis is Good.     Pt will continue to benefit from skilled outpatient physical therapy to address the deficits listed in the problem list box on initial evaluation, provide pt/family education and to maximize pt's level of independence in the home and community environment.     Pt's spiritual, cultural and educational needs considered and pt agreeable to plan of care and goals.     Anticipated Barriers for therapy: sedentary lifestyle and chronicity of condition    Short Term Goals:  4 weeks Progress   1/27/2023   Pain: Pt will demonstrate improved pain by reports of less than or equal to 7/10 worst pain on the  verbal rating scale in order to progress toward maximal functional ability and improve QOL. PC   Function: Patient will demonstrate improved function as indicated by a functional limitation score of less than or equal to 45 out of 100 on FOTO. PC   HEP: Patient will demonstrate independence with HEP in order to progress toward functional independence. PC      Long Term Goals:  8 weeks Progress  1/27/2023   Pain: Pt will demonstrate improved pain by reports of less than or equal to 4/10 worst pain on the verbal rating scale in order to progress toward maximal functional ability and improve QOL.   PC   Function: Patient will demonstrate improved function as indicated by a functional limitation score of less than or equal to 38 out of 100 on FOTO. PC   Strength: Patient will improve strength to stated goals, listed in objective measures above, in order to improve functional independence and quality of life. PC   Gait: Patient will demonstrate normalized gait mechanics with minimal compensation in order to return to PLOF. PC   Goals Key:  PC= progressing/continue; PM= partially met;        DC= discontinue    PLAN   Plan of care Certification: 1/27/2023 to 3/24/2023      Outpatient Physical Therapy 2 times weekly for 8 weeks to include any combination of the following interventions: virtual visits, dry needling, modalities, electrical stimulation (IFC, Pre-Mod, Attended with Functional Dry Needling), Cervical/Lumbar Traction, Gait Training, Manual Therapy, Neuromuscular Re-ed, Patient Education, Self Care, Therapeutic Activites, and Therapeutic Exercise      Continue Plan of Care (POC) and progress per patient tolerance. See treatment section for details on planned progressions next session.    Evelyn Miller, PT,

## 2023-02-28 ENCOUNTER — OFFICE VISIT (OUTPATIENT)
Dept: OBSTETRICS AND GYNECOLOGY | Facility: CLINIC | Age: 43
End: 2023-02-28
Payer: COMMERCIAL

## 2023-02-28 VITALS
HEIGHT: 63 IN | DIASTOLIC BLOOD PRESSURE: 84 MMHG | WEIGHT: 160.25 LBS | SYSTOLIC BLOOD PRESSURE: 130 MMHG | BODY MASS INDEX: 28.39 KG/M2

## 2023-02-28 DIAGNOSIS — Z12.4 CERVICAL CANCER SCREENING: ICD-10-CM

## 2023-02-28 DIAGNOSIS — Z01.419 WELL WOMAN EXAM WITH ROUTINE GYNECOLOGICAL EXAM: Primary | ICD-10-CM

## 2023-02-28 PROCEDURE — 87624 HPV HI-RISK TYP POOLED RSLT: CPT | Performed by: OBSTETRICS & GYNECOLOGY

## 2023-02-28 PROCEDURE — 1160F PR REVIEW ALL MEDS BY PRESCRIBER/CLIN PHARMACIST DOCUMENTED: ICD-10-PCS | Mod: CPTII,S$GLB,, | Performed by: OBSTETRICS & GYNECOLOGY

## 2023-02-28 PROCEDURE — 3079F PR MOST RECENT DIASTOLIC BLOOD PRESSURE 80-89 MM HG: ICD-10-PCS | Mod: CPTII,S$GLB,, | Performed by: OBSTETRICS & GYNECOLOGY

## 2023-02-28 PROCEDURE — 99999 PR PBB SHADOW E&M-EST. PATIENT-LVL III: CPT | Mod: PBBFAC,,, | Performed by: OBSTETRICS & GYNECOLOGY

## 2023-02-28 PROCEDURE — 3075F PR MOST RECENT SYSTOLIC BLOOD PRESS GE 130-139MM HG: ICD-10-PCS | Mod: CPTII,S$GLB,, | Performed by: OBSTETRICS & GYNECOLOGY

## 2023-02-28 PROCEDURE — 3008F BODY MASS INDEX DOCD: CPT | Mod: CPTII,S$GLB,, | Performed by: OBSTETRICS & GYNECOLOGY

## 2023-02-28 PROCEDURE — 1159F MED LIST DOCD IN RCRD: CPT | Mod: CPTII,S$GLB,, | Performed by: OBSTETRICS & GYNECOLOGY

## 2023-02-28 PROCEDURE — 3044F PR MOST RECENT HEMOGLOBIN A1C LEVEL <7.0%: ICD-10-PCS | Mod: CPTII,S$GLB,, | Performed by: OBSTETRICS & GYNECOLOGY

## 2023-02-28 PROCEDURE — 88175 CYTOPATH C/V AUTO FLUID REDO: CPT | Performed by: OBSTETRICS & GYNECOLOGY

## 2023-02-28 PROCEDURE — 3075F SYST BP GE 130 - 139MM HG: CPT | Mod: CPTII,S$GLB,, | Performed by: OBSTETRICS & GYNECOLOGY

## 2023-02-28 PROCEDURE — 99396 PREV VISIT EST AGE 40-64: CPT | Mod: S$GLB,,, | Performed by: OBSTETRICS & GYNECOLOGY

## 2023-02-28 PROCEDURE — 3079F DIAST BP 80-89 MM HG: CPT | Mod: CPTII,S$GLB,, | Performed by: OBSTETRICS & GYNECOLOGY

## 2023-02-28 PROCEDURE — 99999 PR PBB SHADOW E&M-EST. PATIENT-LVL III: ICD-10-PCS | Mod: PBBFAC,,, | Performed by: OBSTETRICS & GYNECOLOGY

## 2023-02-28 PROCEDURE — 1160F RVW MEDS BY RX/DR IN RCRD: CPT | Mod: CPTII,S$GLB,, | Performed by: OBSTETRICS & GYNECOLOGY

## 2023-02-28 PROCEDURE — 99396 PR PREVENTIVE VISIT,EST,40-64: ICD-10-PCS | Mod: S$GLB,,, | Performed by: OBSTETRICS & GYNECOLOGY

## 2023-02-28 PROCEDURE — 3044F HG A1C LEVEL LT 7.0%: CPT | Mod: CPTII,S$GLB,, | Performed by: OBSTETRICS & GYNECOLOGY

## 2023-02-28 PROCEDURE — 3008F PR BODY MASS INDEX (BMI) DOCUMENTED: ICD-10-PCS | Mod: CPTII,S$GLB,, | Performed by: OBSTETRICS & GYNECOLOGY

## 2023-02-28 PROCEDURE — 1159F PR MEDICATION LIST DOCUMENTED IN MEDICAL RECORD: ICD-10-PCS | Mod: CPTII,S$GLB,, | Performed by: OBSTETRICS & GYNECOLOGY

## 2023-02-28 NOTE — PROGRESS NOTES
Subjective:       Patient ID: Sarah Brown is a 42 y.o. female.    Chief Complaint:  Well Woman      History of Present Illness  HPI  Presents for well-woman exam.  No gyn complaints.  Pt has regular, monthly periods.  MM with a male partner.  Had BTL for sterilization.  Last pap: 2019: neg  MMG scheduled tomorrow    GYN & OB History  Patient's last menstrual period was 02/15/2023.   Date of Last Pap: No result found    OB History    Para Term  AB Living   2 2 2     2   SAB IAB Ectopic Multiple Live Births           2      # Outcome Date GA Lbr Percy/2nd Weight Sex Delivery Anes PTL Lv   2 Term      Vag-Spont   CELESTINA   1 Term      Vag-Spont   CELESTINA       Review of Systems  Review of Systems   Constitutional:  Negative for activity change, fatigue, fever and unexpected weight change.   Gastrointestinal:  Negative for abdominal pain, bloating, constipation, diarrhea, nausea and vomiting.   Endocrine: Negative for hair loss and hot flashes.   Genitourinary:  Negative for dysmenorrhea, dyspareunia, dysuria, frequency, genital sores, hematuria, menorrhagia, menstrual problem, pelvic pain, urgency, vaginal bleeding, vaginal discharge, vaginal pain, postcoital bleeding and vaginal odor.   Integumentary:  Negative for rash, hair changes, breast mass, nipple discharge and breast skin changes.   Hematological:  Negative for adenopathy.   Breast: Negative for mass, mastodynia, nipple discharge and skin changes        Objective:    Physical Exam:   Constitutional: She is oriented to person, place, and time. She appears well-developed and well-nourished. No distress.      Neck: No thyromegaly present.     Pulmonary/Chest: Right breast exhibits no inverted nipple, no mass, no nipple discharge, no skin change, no tenderness, no bleeding and no swelling. Left breast exhibits no inverted nipple, no mass, no nipple discharge, no skin change, no tenderness, no bleeding and no swelling. Breasts are symmetrical.         Abdominal: Soft. She exhibits no distension and no mass. There is no abdominal tenderness. There is no rebound and no guarding.     Genitourinary:    Pelvic exam was performed with patient supine.   There is no rash, tenderness, lesion or injury on the right labia. There is no rash, tenderness, lesion or injury on the left labia. Cervix is normal. Right adnexum displays no mass, no tenderness and no fullness. Left adnexum displays no mass, no tenderness and no fullness. No erythema,  no vaginal discharge, tenderness, bleeding, rectocele or cystocele in the vagina.    No foreign body in the vagina.      No signs of injury in the vagina.   Cervix exhibits no motion tenderness, no discharge and no friability. Uterus is not deviated, not enlarged, not fixed, not tender and not hosting fibroids.               Neurological: She is alert and oriented to person, place, and time.     Psychiatric: She has a normal mood and affect.        Assessment:        1. Well woman exam with routine gynecological exam    2. Cervical cancer screening                Plan:      Sarah was seen today for well woman.    Diagnoses and all orders for this visit:    Well woman exam with routine gynecological exam    Cervical cancer screening  -     Liquid-Based Pap Smear, Screening  -     HPV High Risk Genotypes, PCR     Reviewed updated recommendations for pap smears (every 3 years) in low risk patients.   Recommend annual pelvic exams.  Reviewed recommendations for annual CBE and annual MMG.  RTC 1 year or sooner prn.

## 2023-03-01 ENCOUNTER — CLINICAL SUPPORT (OUTPATIENT)
Dept: REHABILITATION | Facility: HOSPITAL | Age: 43
End: 2023-03-01
Payer: COMMERCIAL

## 2023-03-01 ENCOUNTER — HOSPITAL ENCOUNTER (OUTPATIENT)
Dept: RADIOLOGY | Facility: HOSPITAL | Age: 43
Discharge: HOME | End: 2023-03-01
Attending: FAMILY MEDICINE
Payer: COMMERCIAL

## 2023-03-01 VITALS — HEIGHT: 63 IN | WEIGHT: 160.25 LBS | BODY MASS INDEX: 28.39 KG/M2

## 2023-03-01 DIAGNOSIS — G89.29 CHRONIC BILATERAL LOW BACK PAIN WITH RIGHT-SIDED SCIATICA: Primary | ICD-10-CM

## 2023-03-01 DIAGNOSIS — R53.1 WEAKNESS: ICD-10-CM

## 2023-03-01 DIAGNOSIS — M54.41 CHRONIC BILATERAL LOW BACK PAIN WITH RIGHT-SIDED SCIATICA: Primary | ICD-10-CM

## 2023-03-01 DIAGNOSIS — Z12.31 SCREENING MAMMOGRAM FOR HIGH-RISK PATIENT: ICD-10-CM

## 2023-03-01 PROCEDURE — 77067 SCR MAMMO BI INCL CAD: CPT | Mod: TC

## 2023-03-01 PROCEDURE — 97530 THERAPEUTIC ACTIVITIES: CPT | Mod: PN

## 2023-03-01 PROCEDURE — 97112 NEUROMUSCULAR REEDUCATION: CPT | Mod: PN

## 2023-03-01 PROCEDURE — 97140 MANUAL THERAPY 1/> REGIONS: CPT | Mod: PN

## 2023-03-01 PROCEDURE — 77067 MAMMO DIGITAL SCREENING BILAT WITH TOMO: ICD-10-PCS | Mod: 26,,, | Performed by: RADIOLOGY

## 2023-03-01 PROCEDURE — 77063 MAMMO DIGITAL SCREENING BILAT WITH TOMO: ICD-10-PCS | Mod: 26,,, | Performed by: RADIOLOGY

## 2023-03-01 PROCEDURE — 97110 THERAPEUTIC EXERCISES: CPT | Mod: PN

## 2023-03-01 PROCEDURE — 77067 SCR MAMMO BI INCL CAD: CPT | Mod: 26,,, | Performed by: RADIOLOGY

## 2023-03-01 PROCEDURE — 77063 BREAST TOMOSYNTHESIS BI: CPT | Mod: 26,,, | Performed by: RADIOLOGY

## 2023-03-01 NOTE — PROGRESS NOTES
"  Physical Therapy  Daily Treatment Note     Name: Sarah Brown  Clinic Number: [id number]    Therapy Diagnosis:   Encounter Diagnoses   Name Primary?    Chronic bilateral low back pain with right-sided sciatica Yes    Weakness        Physician: Jennifer Rose MD    Visit Date: 3/1/2023    Physician Orders: PT Eval and Treat  Medical Diagnosis from Referral: Osteoarthritis of spine with radiculopathy, lumbar region [M47.26]  Evaluation Date: 1/27/2023  Authorization Period Expiration: 12/31/2022  Plan of Care Expiration: 3/24/2023  Visit # / Visits authorized: 8/20  FOTO: 2/3    Precautions: Standard    Time In: 11:30 am   Time Out: 12:20 pm   Total Billable Time: 50 minutes    SUBJECTIVE     Today, pt reports: pain she felt last session that started over the weekend has only worsened in the past couple days.     She was compliant with home exercise program.  Response to previous treatment: increased soreness   Functional change: n/a today     Pain: 8/10    Location: R hip/ low back    OBJECTIVE / TREATMENT     Objective measures to be updated at Updated POC unless specified otherwise.    Sarah received the following manual therapy techniques applied for (8) minutes, including:    STM and palpation to R hip muscles and lumbar paraspinals prior to and following FDN     Sarah received therapeutic exercises to develop strength, endurance, ROM, flexibility, posture, and core stabilization for (24)  minutes including:    SKTC 3 x 20" B  LTR x 15 B   Piriformis stretch 3 x 20" B  Bridges with ball squeeze 2 x 10  Supine nerve glides x 10     Deferred:   Mod prayer stretch leaning on high mat x 5   Mod IT band stretch straight leg crossover x 5     Sarah participated in neuromuscular re-education activities to improve: Balance, Coordination, Kinesthetic, Sense, Proprioception, and Posture for (10)  minutes., including:    Supine diaphragmatic breathing 3 minutes focus on lumbar muscle relaxation and core activation on " "exhale    PPT minimal motion x 10 5" holds   Supine TA activation 2 x 10 5" holds     Deferred:  + bent knee fall out x10 BLE  + march x10 BLE  Supine clams GTB 2x10  Cat/ cow in quadruped with focus on breathing x 10  Paloff Press double yellow theraband 2x10    Sarah participated in dynamic functional therapeutic activities to improve functional performance for (8)  minutes, including:    Instruction, demonstration and education on sleep postures and log roll transfers while in such a high level of irritability    Deferred:  Farmer's carry 15# KB  Sit to Stand 20 inch box 2x10  Dead lift 20# KB 2x10    Dry Needling Treatment:     Palpation Assessment to determine the necessity for Functional Dry Needling to R hip rotators and gluts.   Patient provided written and verbal consent to Functional Dry Needling YES  Written Handout on response to FDN provided: yes    Sarah demonstrated good  understanding of the education provided.   Patient demonstrated appropriate response to Functional Dry Needling. YES.      After exs., pt rec'd functional dry needling today as follows: FDN to B lumbar paraspinals using four 75 mm fusiform needles placed parallel. Needling was done without estim and with patient instructing therapist on symptoms upon insertion to allow deeper insertion once referral pain was diminished. Needles were left in-situ for 8 minutes once deepest point in muscles were reached.     hot pack for 10 minutes to low back.    Home Exercises Provided and Patient Education Provided     Education/Self-Care provided:  Patient educated on biomechanical justification for therapeutic exercise and importance of compliance with HEP in order to improve overall impairments and QOL   Patient was educated on all the above exercise prior/during/after for proper posture, positioning, and execution for safe performance with home exercise program.    Patient educated on the importance of improved core and lower extremity " strength in order to improve alignment of the spine and lower extremities with static positions and dynamic movement.     Written Home Exercises Provided: Patient instructed to cont prior HEP.  Exercises were reviewed and Sarah was able to demonstrate them prior to the end of the session.  Sarah demonstrated good  understanding of the education provided.     See EMR under Patient Instructions for exercises provided prior visit.    ASSESSMENT   Patient present with continued high pain following activity this past weekend. Therefore, continued with very gentle neuromuscular reeducation and therapeutic exercise to promote relaxation of inflamed tissue. Added back in FDN treatment today, as patient has had successful relief of hyperirritability with this treatment initiation of therapy. Pt tolerance was poor of needling today due to pain levels, but patient requested to go through with treatment in hopes of pain relief following. Therapeutic activity was done to help with pain management at home by means of proper positioning and transitional movements to avoid further irritating motion.     Sarah is progressing well towards her goals.   Pt prognosis is Good.     Pt will continue to benefit from skilled outpatient physical therapy to address the deficits listed in the problem list box on initial evaluation, provide pt/family education and to maximize pt's level of independence in the home and community environment.     Pt's spiritual, cultural and educational needs considered and pt agreeable to plan of care and goals.     Anticipated Barriers for therapy: sedentary lifestyle and chronicity of condition    Short Term Goals:  4 weeks Progress   3/1/2023   Pain: Pt will demonstrate improved pain by reports of less than or equal to 7/10 worst pain on the verbal rating scale in order to progress toward maximal functional ability and improve QOL. PC   Function: Patient will demonstrate improved function as indicated by a  functional limitation score of less than or equal to 45 out of 100 on FOTO. PC   HEP: Patient will demonstrate independence with HEP in order to progress toward functional independence. MET      Long Term Goals:  8 weeks Progress  3/1/2023   Pain: Pt will demonstrate improved pain by reports of less than or equal to 4/10 worst pain on the verbal rating scale in order to progress toward maximal functional ability and improve QOL.   PC   Function: Patient will demonstrate improved function as indicated by a functional limitation score of less than or equal to 38 out of 100 on FOTO. PC   Strength: Patient will improve strength to stated goals, listed in objective measures above, in order to improve functional independence and quality of life. PC   Gait: Patient will demonstrate normalized gait mechanics with minimal compensation in order to return to PLOF. PC   Goals Key:  PC= progressing/continue; PM= partially met;        DC= discontinue    PLAN   Plan of care Certification: 1/27/2023 to 3/24/2023      Outpatient Physical Therapy 2 times weekly for 8 weeks to include any combination of the following interventions: virtual visits, dry needling, modalities, electrical stimulation (IFC, Pre-Mod, Attended with Functional Dry Needling), Cervical/Lumbar Traction, Gait Training, Manual Therapy, Neuromuscular Re-ed, Patient Education, Self Care, Therapeutic Activites, and Therapeutic Exercise      Continue Plan of Care (POC) and progress per patient tolerance. See treatment section for details on planned progressions next session.    Evelyn Miller, PT

## 2023-03-04 LAB
FINAL PATHOLOGIC DIAGNOSIS: NORMAL
Lab: NORMAL

## 2023-03-06 ENCOUNTER — TELEPHONE (OUTPATIENT)
Dept: INTERNAL MEDICINE | Facility: CLINIC | Age: 43
End: 2023-03-06
Payer: COMMERCIAL

## 2023-03-06 ENCOUNTER — CLINICAL SUPPORT (OUTPATIENT)
Dept: REHABILITATION | Facility: HOSPITAL | Age: 43
End: 2023-03-06
Payer: COMMERCIAL

## 2023-03-06 DIAGNOSIS — G89.29 CHRONIC BILATERAL LOW BACK PAIN WITH RIGHT-SIDED SCIATICA: Primary | ICD-10-CM

## 2023-03-06 DIAGNOSIS — R53.1 WEAKNESS: ICD-10-CM

## 2023-03-06 DIAGNOSIS — M47.26 OSTEOARTHRITIS OF SPINE WITH RADICULOPATHY, LUMBAR REGION: Primary | ICD-10-CM

## 2023-03-06 DIAGNOSIS — M54.41 CHRONIC BILATERAL LOW BACK PAIN WITH RIGHT-SIDED SCIATICA: Primary | ICD-10-CM

## 2023-03-06 PROCEDURE — 97112 NEUROMUSCULAR REEDUCATION: CPT | Mod: PN

## 2023-03-06 PROCEDURE — 97110 THERAPEUTIC EXERCISES: CPT | Mod: PN

## 2023-03-06 NOTE — TELEPHONE ENCOUNTER
----- Message from Shady Laughlin MA sent at 3/6/2023  3:23 PM CST -----  Contact: self [phone]  Pt requesting referral to Ortho or Neuro for continuous pain in back and legs per PT. Pt states that she has been in an extreme amount of pain as well. PLEASE ADVISE.  ----- Message -----  From: Shasha Barriga  Sent: 3/6/2023   3:00 PM CST  To: [facility] Staff    Patient called in this afternoon complaining of back pain and swelling in right leg and foot. Starting to affect the left leg. She has been out of work for the past 2 days. Physical therapy is not working. Patient states she needs to see  a specialist. Please call [phone]

## 2023-03-06 NOTE — PROGRESS NOTES
"  Physical Therapy  Daily Treatment Note     Name: Sarah Brown   Clinic Number: [id number]    Therapy Diagnosis:   Encounter Diagnoses   Name Primary?    Chronic bilateral low back pain with right-sided sciatica Yes    Weakness        Physician: Jennifer Rose MD    Visit Date: 3/6/2023    Physician Orders: PT Eval and Treat  Medical Diagnosis from Referral: Osteoarthritis of spine with radiculopathy, lumbar region [M47.26]  Evaluation Date: 1/27/2023  Authorization Period Expiration: 12/31/2022  Plan of Care Expiration: 3/24/2023  Visit # / Visits authorized: 9/20  FOTO: 2/3    Precautions: Standard    Time In: 2:15 pm   Time Out: 2:55 pm   Total Billable Time: 40 minutes    SUBJECTIVE     Today, pt reports: B back pain (R>L) and R lower extremity pain has continued to be heightened since last seen. Pt states she has not gone to work and is barely sleeping due to pain. She has also noticed swelling again in the R lower extremity.     She was compliant with home exercise program.  Response to previous treatment: increased soreness   Functional change: n/a today     Pain: 8/10    Location: R hip/ low back    OBJECTIVE / TREATMENT     Objective measures to be updated at Updated POC unless specified otherwise.    Sarah received the following manual therapy techniques applied for (00) minutes, including:       Sarah received therapeutic exercises to develop strength, endurance, ROM, flexibility, posture, and core stabilization for (25)  minutes including:    SKTC 3 x 20" B  LTR x 15 B   Piriformis stretch 3 x 20" B  Supine Hip Add ball squeeze 2 x 10  Supine Hip Abd against belt 2 x 10    Supine nerve glides (very gentle) x 10     Deferred:   Mod prayer stretch leaning on high mat x 5   Mod IT band stretch straight leg crossover x 5     Sarah participated in neuromuscular re-education activities to improve: Balance, Coordination, Kinesthetic, Sense, Proprioception, and Posture for (15)  minutes., including:    Supine " "diaphragmatic breathing 3 minutes focus on lumbar muscle relaxation and core activation on exhale    PPT minimal motion x 10 5" holds   Supine TA activation 2 x 10 5" holds     Deferred:  + bent knee fall out x10 BLE  + march x10 BLE  Supine clams GTB 2x10  Cat/ cow in quadruped with focus on breathing x 10  Paloff Press double yellow theraband 2x10    Sarah participated in dynamic functional therapeutic activities to improve functional performance for (0)  minutes, including:      Deferred:  Farmer's carry 15# KB  Sit to Stand 20 inch box 2x10  Dead lift 20# KB 2x10    Dry Needling Treatment: (NOT DONE TODAY 3/7)    Palpation Assessment to determine the necessity for Functional Dry Needling to R hip rotators and gluts.   Patient provided written and verbal consent to Functional Dry Needling YES  Written Handout on response to FDN provided: yes    Sarah demonstrated good  understanding of the education provided.   Patient demonstrated appropriate response to Functional Dry Needling. YES.      After exs., pt rec'd functional dry needling today as follows: FDN to B lumbar paraspinals using four 75 mm fusiform needles placed parallel. Needling was done without estim and with patient instructing therapist on symptoms upon insertion to allow deeper insertion once referral pain was diminished. Needles were left in-situ for 8 minutes once deepest point in muscles were reached.     hot pack for 10 minutes to low back.    Home Exercises Provided and Patient Education Provided     Education/Self-Care provided:  Patient educated on biomechanical justification for therapeutic exercise and importance of compliance with HEP in order to improve overall impairments and QOL   Patient was educated on all the above exercise prior/during/after for proper posture, positioning, and execution for safe performance with home exercise program.    Patient was educated on the anatomy around affected area and possible causes of pain. "       Written Home Exercises Provided: Patient instructed to cont prior HEP.  Exercises were reviewed and Sarah was able to demonstrate them prior to the end of the session.  Sarah demonstrated good  understanding of the education provided.     See EMR under Patient Instructions for exercises provided prior visit.    ASSESSMENT   Patient presents with continued high pain without resolution. PT instructed patient to contact MD about persistent symptoms to ask about further steps for management of symptoms. Previous gains with physical therapy have been regressed due patients tolerance decrease with the recent increase in symptoms. Continued with gentle core activation and mobility activity. Will continue and progress per tolerance and MD recommendations.     Sarah is progressing well towards her goals.   Pt prognosis is Good.     Pt will continue to benefit from skilled outpatient physical therapy to address the deficits listed in the problem list box on initial evaluation, provide pt/family education and to maximize pt's level of independence in the home and community environment.     Pt's spiritual, cultural and educational needs considered and pt agreeable to plan of care and goals.     Anticipated Barriers for therapy: sedentary lifestyle and chronicity of condition    Short Term Goals:  4 weeks Progress   3/1/2023   Pain: Pt will demonstrate improved pain by reports of less than or equal to 7/10 worst pain on the verbal rating scale in order to progress toward maximal functional ability and improve QOL. PC   Function: Patient will demonstrate improved function as indicated by a functional limitation score of less than or equal to 45 out of 100 on FOTO. PC   HEP: Patient will demonstrate independence with HEP in order to progress toward functional independence. MET      Long Term Goals:  8 weeks Progress  3/1/2023   Pain: Pt will demonstrate improved pain by reports of less than or equal to 4/10 worst pain on  the verbal rating scale in order to progress toward maximal functional ability and improve QOL.   PC   Function: Patient will demonstrate improved function as indicated by a functional limitation score of less than or equal to 38 out of 100 on FOTO. PC   Strength: Patient will improve strength to stated goals, listed in objective measures above, in order to improve functional independence and quality of life. PC   Gait: Patient will demonstrate normalized gait mechanics with minimal compensation in order to return to PLOF. PC   Goals Key:  PC= progressing/continue; PM= partially met;        DC= discontinue    PLAN   Plan of care Certification: 1/27/2023 to 3/24/2023      Outpatient Physical Therapy 2 times weekly for 8 weeks to include any combination of the following interventions: virtual visits, dry needling, modalities, electrical stimulation (IFC, Pre-Mod, Attended with Functional Dry Needling), Cervical/Lumbar Traction, Gait Training, Manual Therapy, Neuromuscular Re-ed, Patient Education, Self Care, Therapeutic Activites, and Therapeutic Exercise      Continue Plan of Care (POC) and progress per patient tolerance. See treatment section for details on planned progressions next session.    Evelyn Miller, PT

## 2023-03-07 ENCOUNTER — TELEPHONE (OUTPATIENT)
Dept: PAIN MEDICINE | Facility: CLINIC | Age: 43
End: 2023-03-07
Payer: COMMERCIAL

## 2023-03-07 ENCOUNTER — OFFICE VISIT (OUTPATIENT)
Dept: PHYSICAL MEDICINE AND REHAB | Facility: CLINIC | Age: 43
End: 2023-03-07
Payer: COMMERCIAL

## 2023-03-07 ENCOUNTER — OFFICE VISIT (OUTPATIENT)
Dept: PAIN MEDICINE | Facility: CLINIC | Age: 43
End: 2023-03-07
Payer: COMMERCIAL

## 2023-03-07 ENCOUNTER — HOSPITAL ENCOUNTER (OUTPATIENT)
Dept: RADIOLOGY | Facility: HOSPITAL | Age: 43
Discharge: HOME | End: 2023-03-07
Attending: PHYSICAL MEDICINE & REHABILITATION
Payer: COMMERCIAL

## 2023-03-07 VITALS
SYSTOLIC BLOOD PRESSURE: 142 MMHG | RESPIRATION RATE: 14 BRPM | HEIGHT: 63 IN | BODY MASS INDEX: 28.35 KG/M2 | DIASTOLIC BLOOD PRESSURE: 97 MMHG | WEIGHT: 160 LBS | HEART RATE: 93 BPM

## 2023-03-07 VITALS
WEIGHT: 158.5 LBS | BODY MASS INDEX: 28.08 KG/M2 | SYSTOLIC BLOOD PRESSURE: 127 MMHG | HEIGHT: 63 IN | RESPIRATION RATE: 17 BRPM | DIASTOLIC BLOOD PRESSURE: 88 MMHG | HEART RATE: 88 BPM

## 2023-03-07 DIAGNOSIS — M47.26 OSTEOARTHRITIS OF SPINE WITH RADICULOPATHY, LUMBAR REGION: ICD-10-CM

## 2023-03-07 DIAGNOSIS — M51.36 DDD (DEGENERATIVE DISC DISEASE), LUMBAR: Primary | ICD-10-CM

## 2023-03-07 DIAGNOSIS — M54.9 DORSALGIA, UNSPECIFIED: ICD-10-CM

## 2023-03-07 DIAGNOSIS — M54.16 LUMBAR RADICULOPATHY: ICD-10-CM

## 2023-03-07 DIAGNOSIS — M54.16 LUMBAR RADICULOPATHY: Primary | ICD-10-CM

## 2023-03-07 DIAGNOSIS — M47.816 LUMBAR FACET ARTHROPATHY: ICD-10-CM

## 2023-03-07 LAB
HPV HR 12 DNA SPEC QL NAA+PROBE: NEGATIVE
HPV16 AG SPEC QL: NEGATIVE
HPV18 DNA SPEC QL NAA+PROBE: NEGATIVE

## 2023-03-07 PROCEDURE — 99999 PR PBB SHADOW E&M-EST. PATIENT-LVL IV: CPT | Mod: PBBFAC,,, | Performed by: ANESTHESIOLOGY

## 2023-03-07 PROCEDURE — 1159F MED LIST DOCD IN RCRD: CPT | Mod: CPTII,S$GLB,, | Performed by: PHYSICAL MEDICINE & REHABILITATION

## 2023-03-07 PROCEDURE — 72114 X-RAY EXAM L-S SPINE BENDING: CPT | Mod: TC

## 2023-03-07 PROCEDURE — 3079F DIAST BP 80-89 MM HG: CPT | Mod: CPTII,S$GLB,, | Performed by: ANESTHESIOLOGY

## 2023-03-07 PROCEDURE — 3080F PR MOST RECENT DIASTOLIC BLOOD PRESSURE >= 90 MM HG: ICD-10-PCS | Mod: CPTII,S$GLB,, | Performed by: PHYSICAL MEDICINE & REHABILITATION

## 2023-03-07 PROCEDURE — 3044F HG A1C LEVEL LT 7.0%: CPT | Mod: CPTII,S$GLB,, | Performed by: PHYSICAL MEDICINE & REHABILITATION

## 2023-03-07 PROCEDURE — 1159F PR MEDICATION LIST DOCUMENTED IN MEDICAL RECORD: ICD-10-PCS | Mod: CPTII,S$GLB,, | Performed by: PHYSICAL MEDICINE & REHABILITATION

## 2023-03-07 PROCEDURE — 1159F PR MEDICATION LIST DOCUMENTED IN MEDICAL RECORD: ICD-10-PCS | Mod: CPTII,S$GLB,, | Performed by: ANESTHESIOLOGY

## 2023-03-07 PROCEDURE — 3079F PR MOST RECENT DIASTOLIC BLOOD PRESSURE 80-89 MM HG: ICD-10-PCS | Mod: CPTII,S$GLB,, | Performed by: ANESTHESIOLOGY

## 2023-03-07 PROCEDURE — 3074F PR MOST RECENT SYSTOLIC BLOOD PRESSURE < 130 MM HG: ICD-10-PCS | Mod: CPTII,S$GLB,, | Performed by: ANESTHESIOLOGY

## 2023-03-07 PROCEDURE — 99204 OFFICE O/P NEW MOD 45 MIN: CPT | Mod: S$GLB,,, | Performed by: ANESTHESIOLOGY

## 2023-03-07 PROCEDURE — 3044F PR MOST RECENT HEMOGLOBIN A1C LEVEL <7.0%: ICD-10-PCS | Mod: CPTII,S$GLB,, | Performed by: ANESTHESIOLOGY

## 2023-03-07 PROCEDURE — 99999 PR PBB SHADOW E&M-EST. PATIENT-LVL IV: CPT | Mod: PBBFAC,,, | Performed by: PHYSICAL MEDICINE & REHABILITATION

## 2023-03-07 PROCEDURE — 3044F HG A1C LEVEL LT 7.0%: CPT | Mod: CPTII,S$GLB,, | Performed by: ANESTHESIOLOGY

## 2023-03-07 PROCEDURE — 99999 PR PBB SHADOW E&M-EST. PATIENT-LVL IV: ICD-10-PCS | Mod: PBBFAC,,, | Performed by: ANESTHESIOLOGY

## 2023-03-07 PROCEDURE — 3077F PR MOST RECENT SYSTOLIC BLOOD PRESSURE >= 140 MM HG: ICD-10-PCS | Mod: CPTII,S$GLB,, | Performed by: PHYSICAL MEDICINE & REHABILITATION

## 2023-03-07 PROCEDURE — 3008F BODY MASS INDEX DOCD: CPT | Mod: CPTII,S$GLB,, | Performed by: PHYSICAL MEDICINE & REHABILITATION

## 2023-03-07 PROCEDURE — 72114 XR LUMBAR SPINE 5 VIEW WITH FLEX AND EXT: ICD-10-PCS | Mod: 26,,, | Performed by: RADIOLOGY

## 2023-03-07 PROCEDURE — 1160F PR REVIEW ALL MEDS BY PRESCRIBER/CLIN PHARMACIST DOCUMENTED: ICD-10-PCS | Mod: CPTII,S$GLB,, | Performed by: ANESTHESIOLOGY

## 2023-03-07 PROCEDURE — 99204 PR OFFICE/OUTPT VISIT, NEW, LEVL IV, 45-59 MIN: ICD-10-PCS | Mod: S$GLB,,, | Performed by: ANESTHESIOLOGY

## 2023-03-07 PROCEDURE — 99204 PR OFFICE/OUTPT VISIT, NEW, LEVL IV, 45-59 MIN: ICD-10-PCS | Mod: S$GLB,,, | Performed by: PHYSICAL MEDICINE & REHABILITATION

## 2023-03-07 PROCEDURE — 1160F PR REVIEW ALL MEDS BY PRESCRIBER/CLIN PHARMACIST DOCUMENTED: ICD-10-PCS | Mod: CPTII,S$GLB,, | Performed by: PHYSICAL MEDICINE & REHABILITATION

## 2023-03-07 PROCEDURE — 99999 PR PBB SHADOW E&M-EST. PATIENT-LVL IV: ICD-10-PCS | Mod: PBBFAC,,, | Performed by: PHYSICAL MEDICINE & REHABILITATION

## 2023-03-07 PROCEDURE — 3074F SYST BP LT 130 MM HG: CPT | Mod: CPTII,S$GLB,, | Performed by: ANESTHESIOLOGY

## 2023-03-07 PROCEDURE — 72114 X-RAY EXAM L-S SPINE BENDING: CPT | Mod: 26,,, | Performed by: RADIOLOGY

## 2023-03-07 PROCEDURE — 1160F RVW MEDS BY RX/DR IN RCRD: CPT | Mod: CPTII,S$GLB,, | Performed by: PHYSICAL MEDICINE & REHABILITATION

## 2023-03-07 PROCEDURE — 3008F PR BODY MASS INDEX (BMI) DOCUMENTED: ICD-10-PCS | Mod: CPTII,S$GLB,, | Performed by: PHYSICAL MEDICINE & REHABILITATION

## 2023-03-07 PROCEDURE — 1160F RVW MEDS BY RX/DR IN RCRD: CPT | Mod: CPTII,S$GLB,, | Performed by: ANESTHESIOLOGY

## 2023-03-07 PROCEDURE — 3077F SYST BP >= 140 MM HG: CPT | Mod: CPTII,S$GLB,, | Performed by: PHYSICAL MEDICINE & REHABILITATION

## 2023-03-07 PROCEDURE — 1159F MED LIST DOCD IN RCRD: CPT | Mod: CPTII,S$GLB,, | Performed by: ANESTHESIOLOGY

## 2023-03-07 PROCEDURE — 3008F PR BODY MASS INDEX (BMI) DOCUMENTED: ICD-10-PCS | Mod: CPTII,S$GLB,, | Performed by: ANESTHESIOLOGY

## 2023-03-07 PROCEDURE — 3008F BODY MASS INDEX DOCD: CPT | Mod: CPTII,S$GLB,, | Performed by: ANESTHESIOLOGY

## 2023-03-07 PROCEDURE — 3044F PR MOST RECENT HEMOGLOBIN A1C LEVEL <7.0%: ICD-10-PCS | Mod: CPTII,S$GLB,, | Performed by: PHYSICAL MEDICINE & REHABILITATION

## 2023-03-07 PROCEDURE — 99204 OFFICE O/P NEW MOD 45 MIN: CPT | Mod: S$GLB,,, | Performed by: PHYSICAL MEDICINE & REHABILITATION

## 2023-03-07 PROCEDURE — 3080F DIAST BP >= 90 MM HG: CPT | Mod: CPTII,S$GLB,, | Performed by: PHYSICAL MEDICINE & REHABILITATION

## 2023-03-07 RX ORDER — METHYLPREDNISOLONE 4 MG/1
TABLET ORAL
Qty: 1 EACH | Refills: 0 | Status: SHIPPED | OUTPATIENT
Start: 2023-03-07

## 2023-03-07 RX ORDER — KETOROLAC TROMETHAMINE 10 MG/1
10 TABLET, FILM COATED ORAL 2 TIMES DAILY
Qty: 10 TABLET | Refills: 0 | Status: SHIPPED | OUTPATIENT
Start: 2023-03-07 | End: 2023-03-12

## 2023-03-07 RX ORDER — GABAPENTIN 300 MG/1
300 CAPSULE ORAL 3 TIMES DAILY
Qty: 90 CAPSULE | Refills: 1 | Status: SHIPPED | OUTPATIENT
Start: 2023-03-07

## 2023-03-07 NOTE — PROGRESS NOTES
PM&R CLINIC NOTE    Chief Complaint   Patient presents with    Back Pain    leg pain     HPI: This is a 42 y.o.  female being seen in clinic today for evaluation of worsening low back and right leg pain over the past few days.  She was in PT and progressing well, but has new pain with radiation of burning into her leg.  She is limited in walking and prolonged sitting or standing.      History obtained from patient    Functional History:  Walking: Not limited  Transfers: Independent  Assistive devices: No  Power mobility: No  Falls: None   Directional preference:    Employment status:    Needs help with:  Nothing - all ADLS normal    Review of Systems:     General- denies lethargy, weight change, fever, chills  Head/neck- denies swallowing difficulties  ENT- denies hearing changes  Cardiovascular-denies chest pain  Pulmonary- denies shortness of breath  GI- denies constipation or bowel incontinence  - denies bladder incontinence  Skin- denies wounds or rashes  Musculoskeletal- denies weakness, +pain  Neurologic- +numbness and tingling  Psychiatric- denies depressive or psychotic features, denies anxiety  Lymphatic-denies swelling  Endocrine- denies hypoglycemic symptoms/DM history    Physical Examination:  General: Well developed, well nourished female, NAD  HEENT: NCAT EOMI  Pulmonary: Normal respirations    Spinal Examination: CERVICAL  Active ROM is within normal limits.  Inspection: No deformity of spinal alignment.    Spinal Examination: LUMBAR or THORACIC  Active ROM is limited at full flexion and ext  Inspection: No deformity of spinal alignment.  No palpable olisthesis.  Palpation: ttp at si joints and paraspinals  Slr +on right    Bilateral Upper and Lower Extremities:  Shoulder/Elbow/Wrist/Hand ROM   Hip/Knee/Ankle ROM wnl except limited hip and knee ROM on right due to pain  Bilateral Extremities show normal capillary refill.  No signs of cyanosis, rubor, edema, skin changes, or dysvascular  changes of appendages.  Nails appear intact.    Neurological Exam:  Cranial Nerves:  II-XII grossly intact    Manual Muscle Testing: (Motor 5=normal)    RIGHT Lower extremity: Hip flexion 5/5, Hip Abduction 4/5, Knee extension 3+p/5, Knee flexion 5/5, Ankle dorsiflexion 5/5, Extensor hallucis longus 5/5, Ankle plantarflexion 5/5  LEFT Lower extremity:  Hip flexion 5/5, Hip Abduction 4/5, Knee extension 5/5, Knee flexion 5/5, Ankle dorsiflexion 5/5, Extensor hallucis longus 5/5, Ankle plantarflexion 5/5    No focal atrophy is noted of either upper or lower extremity.    Sensation: tested to light touch  - intact in legs except dec at right lower leg  Gait: analgic favoring right leg    IMPRESSION/PLAN: This is a 42 y.o.  female with mild lumbar DJD/DDD, right leg radiculitis    1. Keotorolac 10mg BID x5 days, gabapentin 300mg TID  2. Message sent to [facility] as pt has referral from her PCP  3. Xray lumbar spine  4. Cont HEP to keep core strong, stretch as tolerated  5. Stop overuse of heat. Try epsom soak or hot shower, ice compress    Carmen Gray M.D.  Physical Medicine and Rehab

## 2023-03-07 NOTE — PROGRESS NOTES
New Patient Chronic Pain Note (Initial Visit)    Referring Physician: Carmen Gray MD    PCP: Jennifer Rose MD    Chief Complaint:   Chief Complaint   Patient presents with    Low-back Pain    Leg Pain        SUBJECTIVE:    Sarah Brown is a 42 y.o. female with past medical history significant for migraines, hypertension, obesity who presents to the clinic for the evaluation of lower back and leg pain.  Patient reports pain has been present for the last 2-3 years without inciting accident injury or trauma.  Pain has been particularly severe over the last few months.  Pain is intermittent and today is rated a 10/10.  Pain is described as pulling, numbness in nature.  Patient reports pain in a bandlike distribution in the lower back which radiates down the lateral and posterior aspects of bilateral lower extremities in L4-S2 distribution to the calf.  Pain is more severe on the right than on the left.  Pain is exacerbated with prolonged sitting, moving from sitting to standing and with ambulating even a few steps.  Patient does endorse associated weakness in the right lower extremity associated with her pain.  Pain is marginally improved with sitting on a pillow.  Patient has completed 10 sessions of physical therapy from 01/27/2023 through 03/06/2023 without any meaningful improvement in her lower back or leg pain.  Patient was started on gabapentin 300 mg 3 times daily by Dr. Gray starting today as well as Toradol 10 mg up to twice daily.  Patient has not trialed membrane stabilizing agents prior to this.  Patient has not received prior intervention.    Patient reports significant motor weakness and loss of sensations.  Patient denies night fever/night sweats, urinary incontinence, bowel incontinence, and significant weight loss.      Pain Disability Index Review:     Last 3 PDI Scores 3/7/2023   Pain Disability Index (PDI) 60       Non-Pharmacologic Treatments:  Physical Therapy/Home Exercise:  yes  Ice/Heat:no  TENS: no  Acupuncture: no  Massage: no  Chiropractic: no    Other: no      Pain Medications:  - Adjuvant Medications: Neurontin (Gabapentin) and Toradol (Ketorolac)    Pain Procedures:   None    Past Medical History:   Diagnosis Date    Hypertension     diet controled    Migraines     with aura, per pt     Past Surgical History:   Procedure Laterality Date    TUBAL LIGATION  9/26/14     Review of patient's allergies indicates:   Allergen Reactions    Gluten protein Itching       Current Outpatient Medications   Medication Sig    fluticasone propionate (FLONASE) 50 mcg/actuation nasal spray 1 spray (50 mcg total) by Each Nostril route once daily.    gabapentin (NEURONTIN) 300 MG capsule Take 1 capsule (300 mg total) by mouth 3 (three) times daily.    hydroCHLOROthiazide (HYDRODIURIL) 25 MG tablet Take 1 tablet (25 mg total) by mouth once daily.    ketorolac (TORADOL) 10 mg tablet Take 1 tablet (10 mg total) by mouth 2 (two) times daily. for 5 days    naproxen (NAPROSYN) 500 MG tablet Take 1 tablet (500 mg total) by mouth 2 (two) times daily as needed.    NIFEdipine (PROCARDIA-XL) 60 MG (OSM) 24 hr tablet Take 1 tablet (60 mg total) by mouth 2 (two) times a day.    methylPREDNISolone (MEDROL DOSEPACK) 4 mg tablet use as directed     No current facility-administered medications for this visit.       Review of Systems     GENERAL:  No weight loss, malaise or fevers.  HEENT:   No recent changes in vision or hearing  NECK:  Negative for lumps, no difficulty with swallowing.  RESPIRATORY:  Negative for cough, wheezing or shortness of breath, patient denies any recent URI.  CARDIOVASCULAR:  Negative for chest pain or palpitations.  GI:  Negative for abdominal discomfort, blood in stools or black stools or change in bowel habits.  MUSCULOSKELETAL:  See HPI.  SKIN:  Negative for lesions, rash, and itching.  PSYCH:  No mood disorder or recent psychosocial stressors.   HEMATOLOGY/LYMPHOLOGY:  Negative for  "prolonged bleeding, bruising easily or swollen nodes.    NEURO:   No history of syncope, paralysis, seizures or tremors.  All other reviewed and negative other than HPI.    OBJECTIVE:    /88   Pulse 88   Resp 17   Ht 5' 3" (1.6 m)   Wt 71.9 kg (158 lb 8.2 oz)   LMP 02/15/2023   BMI 28.08 kg/m²       Physical Exam    GENERAL: Well appearing, in no acute distress, alert and oriented x3.  PSYCH:  Mood and affect appropriate.  SKIN: Skin color, texture, turgor normal, no rashes or lesions.  HEAD/FACE:  Normocephalic, atraumatic. Cranial nerves grossly intact.    CV: RRR with palpation of the radial artery.  PULM: No evidence of respiratory difficulty, symmetric chest rise.  GI:  Soft and non-tender.    BACK: Straight leg raising in the sitting and supine positions is negative to radicular pain. pain to palpation over the facet joints of the lumbar spine or spinous processes. Normal range of motion without pain reproduction.  EXTREMITIES:  Peripheral joint range of motion is full but with pain in obvious instability in the right lower extremity.  No deformities, edema, or skin discoloration. Good capillary refill.  MUSCULOSKELETAL: Unable to stand on heels & toes.   hip, and knee provocative maneuvers are negative.    SIJ testing: bilateral  - TTP over SI joint: Present  - Tiera's/ Ubaldo's: Positive    - Sacroiliac Distraction Test (anterior pressure): Positive  - Sacroiliac Compression Test (lateral pressure): Positive   - SacralThrust Test (posterior pressure): Positive    Facet loading test is positive bilaterally.   Bilateral upper and lower extremity strength is normal and symmetric.  No atrophy or tone abnormalities are noted.    RIGHT Lower extremity: Hip flexion 4/5, Hip Abduction 4/5, Hip Adduction 4/5, Knee extension 4/5, Knee flexion 4/5, Ankle dorsiflexion 5/5, Extensor hallucis longus 5/5, Ankle plantarflexion 5/5  LEFT Lower extremity:  Hip flexion 5/5, Hip Abduction 5/5,Hip Adduction 5/5, " Knee extension 5/5, Knee flexion 5/5, Ankle dorsiflexion 5/5, Extensor hallucis longus 5/5, Ankle plantarflexion 5/5  -Normal testing knee (patellar) jerk and ankle (achilles) jerk    NEURO: Bilateral upper and lower extremity coordination and muscle stretch reflexes are physiologic and symmetric. No loss of sensation is noted.  GAIT: normal.    Imagin16    X-Ray Lumbar Spine AP And Lateral    Narrative  Three views of the lumbar spine    Findings: The vertebral bodies demonstrate normal height.  There is very minimal dextroscoliosis of the thoracolumbar junction. Minimal disc space narrowing is suspected at the L5-S1 level. There appears to be some mild facet arthropathy within the lower lumbar spine.      ASSESSMENT: 42 y.o. year old female with lower back and leg pain, consistent with     1. DDD (degenerative disc disease), lumbar        2. Osteoarthritis of spine with radiculopathy, lumbar region  Ambulatory referral/consult to Pain Clinic      3. Lumbar facet arthropathy        4. Dorsalgia, unspecified        5. Lumbar radiculopathy  MRI Lumbar Spine Without Contrast            PLAN:   - Interventions:  We have discussed considering a lumbar epidural to address right-sided radicular pain versus lumbar medial branch block to address axial back pain.  We will 1st review lumbar MRI. Explained the risks and benefits of the procedure in detail with the patient today in clinic along with alternative treatment options    - Anticoagulation use: no no anticoagulation     report:  Reviewed and consistent with medication use as prescribed.    - Medications:  -  We have discussed starting the patient on gabapentin.  We have reviewed potential side effects of this medication including daytime somnolence, weight gain and peripheral edema  -Gabapentin  300 mg t.i.d. (Dr. Gray)      - Therapy:   We discussed continuing physical therapy to help manage the patient/s painful condition. The patient was counseled  that muscle strengthening will improve the long term prognosis in regards to pain and may also help increase range of motion and mobility.     - Imaging: Reviewed available imaging with patient and answered any questions they had regarding study.  MRI lumbar spine to better evaluate severe pain and weakness    - Follow up visit: return to clinic in 4-6 weeks      The above plan and management options were discussed at length with patient. Patient is in agreement with the above and verbalized understanding.    - I discussed the goals of interventional chronic pain management with the patient on today's visit. We discussed a multimodal and systematic approach to pain.  This includes diagnostic and therapeutic injections, adjuvant pharmacologic treatment, physical therapy, and at times psychiatry.  I emphasized the importance of regular exercise, core strengthening and stretching, diet and weight loss as a cornerstone of long-term pain management.    - This condition does not require this patient to take time off of work, and the primary goal of our Pain Management services is to improve the patient's functional capacity.  - Patient Questions: Answered all of the patient's questions regarding diagnoses, therapy, treatment and next steps        Doris Mello MD  Interventional Pain Management  [facility]    Disclaimer:  This note was prepared using voice recognition system and is likely to have sound alike errors that may have been overlooked even after proof reading.  Please call me with any questions

## 2023-03-07 NOTE — TELEPHONE ENCOUNTER
Pt contacted to schedule apt per , first available upholding the gal integrity, next available after today was May, Pt accepted today's appointment. Currently in x-ray.

## 2023-03-07 NOTE — TELEPHONE ENCOUNTER
----- Message from Carmen Gray MD sent at 3/7/2023 12:53 PM CST -----  Can someone please contact this patient to get scheduled for MARÍA eval. Acute  radic in right leg. I'm getting updated xray today and starting her on gabapentin and ketorolac x5 days.

## 2023-03-08 ENCOUNTER — CLINICAL SUPPORT (OUTPATIENT)
Dept: REHABILITATION | Facility: HOSPITAL | Age: 43
End: 2023-03-08
Payer: COMMERCIAL

## 2023-03-08 DIAGNOSIS — G89.29 CHRONIC BILATERAL LOW BACK PAIN WITH RIGHT-SIDED SCIATICA: Primary | ICD-10-CM

## 2023-03-08 DIAGNOSIS — R53.1 WEAKNESS: ICD-10-CM

## 2023-03-08 DIAGNOSIS — M54.41 CHRONIC BILATERAL LOW BACK PAIN WITH RIGHT-SIDED SCIATICA: Primary | ICD-10-CM

## 2023-03-08 PROCEDURE — 97112 NEUROMUSCULAR REEDUCATION: CPT | Mod: PN,CQ

## 2023-03-08 PROCEDURE — 97110 THERAPEUTIC EXERCISES: CPT | Mod: PN,CQ

## 2023-03-08 NOTE — PROGRESS NOTES
"  Physical Therapy Assistant Daily Treatment Note     Name: Sarah Brown   Clinic Number: [id number]    Therapy Diagnosis:   Encounter Diagnoses   Name Primary?    Chronic bilateral low back pain with right-sided sciatica Yes    Weakness        Physician: Jennifer Rose MD    Visit Date: 3/8/2023    Physician Orders: PT Eval and Treat  Medical Diagnosis from Referral: Osteoarthritis of spine with radiculopathy, lumbar region [M47.26]  Evaluation Date: 1/27/2023  Authorization Period Expiration: 12/31/2022  Plan of Care Expiration: 3/24/2023  Visit # / Visits authorized: 10/20  FOTO: 2/3    Precautions: Standard    Time In: 11:31 am   Time Out: 12:10 pm   Total Billable Time: 39 minutes    SUBJECTIVE     Today, pt reports: she is having less pain in right leg pain today since taking medication for nerve pain, anti-inflammatory and muscle relaxer as well as steroid. Doctor wants her to avoid heat since she has inflammation and DDD. She also said to avoid bending and heavy lifting when she returns to work on Monday. MRI is scheduled for tomorrow to determine if injection or surgery would be helpful.    She was compliant with home exercise program.  Response to previous treatment: increased soreness   Functional change: n/a today     Pain: 3/10    Location: R hip/ low back    OBJECTIVE / TREATMENT     Objective measures to be updated at Updated POC unless specified otherwise.    Sarah received the following manual therapy techniques applied for (00) minutes, including:       Sarah received therapeutic exercises to develop strength, endurance, ROM, flexibility, posture, and core stabilization for (24)  minutes including:    SKTC 3 x 20" B  LTR x 15 B   Piriformis stretch 3 x 20" B  Supine Hip Add ball squeeze 2 x 10  Supine Hip Abd against belt 2 x 10    Supine nerve glides (very gentle) x 10     Deferred:   Mod prayer stretch leaning on high mat x 5   Mod IT band stretch straight leg crossover x 5     Sarah " "participated in neuromuscular re-education activities to improve: Balance, Coordination, Kinesthetic, Sense, Proprioception, and Posture for (15)  minutes., including:    Supine diaphragmatic breathing 3 minutes focus on lumbar muscle relaxation and core activation on exhale    PPT minimal motion x 10 5" holds   Supine TA activation 2 x 10 5" holds   + march x10 BLE  + bent knee fall out x10 BLE    Deferred:  Supine clams GTB 2x10  Cat/ cow in quadruped with focus on breathing x 10  Paloff Press double yellow theraband 2x10    Sarah participated in dynamic functional therapeutic activities to improve functional performance for (0)  minutes, including:      Deferred:  Farmer's carry 15# KB  Sit to Stand 20 inch box 2x10  Dead lift 20# KB 2x10    Dry Needling Treatment: (NOT DONE TODAY 3/7)    Palpation Assessment to determine the necessity for Functional Dry Needling to R hip rotators and gluts.   Patient provided written and verbal consent to Functional Dry Needling YES  Written Handout on response to FDN provided: yes    Sarah demonstrated good  understanding of the education provided.   Patient demonstrated appropriate response to Functional Dry Needling. YES.      After exs., pt rec'd functional dry needling today as follows: FDN to B lumbar paraspinals using four 75 mm fusiform needles placed parallel. Needling was done without estim and with patient instructing therapist on symptoms upon insertion to allow deeper insertion once referral pain was diminished. Needles were left in-situ for 8 minutes once deepest point in muscles were reached.     Cold pack for 0 minutes to low back.    Home Exercises Provided and Patient Education Provided     Education/Self-Care provided:  Patient educated on biomechanical justification for therapeutic exercise and importance of compliance with HEP in order to improve overall impairments and QOL   Patient was educated on all the above exercise prior/during/after for proper " posture, positioning, and execution for safe performance with home exercise program.    Patient was educated on the anatomy around affected area and possible causes of pain.       Written Home Exercises Provided: Patient instructed to cont prior HEP.  Exercises were reviewed and Sarah was able to demonstrate them prior to the end of the session.  Sarah demonstrated good  understanding of the education provided.     See EMR under Patient Instructions for exercises provided prior visit.    ASSESSMENT   Patient presents with decreased pain levels with medication change when she saw specialists yesterday. PT session focused on deep core strengthening and pain relief techniques to improve mobility. Patient demonstrates good tolerance to core strengthening without any lateral hip shifting demonstrating good core control. Patient will schedule more PT appointments to maintain mobility and improve strength while awaiting further specialist appointments.    Sarah is progressing well towards her goals.   Pt prognosis is Good.     Pt will continue to benefit from skilled outpatient physical therapy to address the deficits listed in the problem list box on initial evaluation, provide pt/family education and to maximize pt's level of independence in the home and community environment.     Pt's spiritual, cultural and educational needs considered and pt agreeable to plan of care and goals.     Anticipated Barriers for therapy: sedentary lifestyle and chronicity of condition    Short Term Goals:  4 weeks Progress   3/1/2023   Pain: Pt will demonstrate improved pain by reports of less than or equal to 7/10 worst pain on the verbal rating scale in order to progress toward maximal functional ability and improve QOL. PC   Function: Patient will demonstrate improved function as indicated by a functional limitation score of less than or equal to 45 out of 100 on FOTO. PC   HEP: Patient will demonstrate independence with HEP in order  to progress toward functional independence. MET      Long Term Goals:  8 weeks Progress  3/1/2023   Pain: Pt will demonstrate improved pain by reports of less than or equal to 4/10 worst pain on the verbal rating scale in order to progress toward maximal functional ability and improve QOL.   PC   Function: Patient will demonstrate improved function as indicated by a functional limitation score of less than or equal to 38 out of 100 on FOTO. PC   Strength: Patient will improve strength to stated goals, listed in objective measures above, in order to improve functional independence and quality of life. PC   Gait: Patient will demonstrate normalized gait mechanics with minimal compensation in order to return to PLOF. PC   Goals Key:  PC= progressing/continue; PM= partially met;        DC= discontinue    PLAN   Plan of care Certification: 1/27/2023 to 3/24/2023      Outpatient Physical Therapy 2 times weekly for 8 weeks to include any combination of the following interventions: virtual visits, dry needling, modalities, electrical stimulation (IFC, Pre-Mod, Attended with Functional Dry Needling), Cervical/Lumbar Traction, Gait Training, Manual Therapy, Neuromuscular Re-ed, Patient Education, Self Care, Therapeutic Activites, and Therapeutic Exercise      Continue Plan of Care (POC) and progress per patient tolerance. See treatment section for details on planned progressions next session.    Roslyn Rosa, PTA

## 2023-03-09 ENCOUNTER — HOSPITAL ENCOUNTER (OUTPATIENT)
Dept: RADIOLOGY | Facility: HOSPITAL | Age: 43
Discharge: HOME | End: 2023-03-09
Attending: ANESTHESIOLOGY
Payer: COMMERCIAL

## 2023-03-09 DIAGNOSIS — M54.16 LUMBAR RADICULOPATHY: ICD-10-CM

## 2023-03-09 PROCEDURE — 72148 MRI LUMBAR SPINE W/O DYE: CPT | Mod: TC

## 2023-03-09 PROCEDURE — 72148 MRI LUMBAR SPINE W/O DYE: CPT | Mod: 26,,, | Performed by: STUDENT IN AN ORGANIZED HEALTH CARE EDUCATION/TRAINING PROGRAM

## 2023-03-09 PROCEDURE — 72148 MRI LUMBAR SPINE WITHOUT CONTRAST: ICD-10-PCS | Mod: 26,,, | Performed by: STUDENT IN AN ORGANIZED HEALTH CARE EDUCATION/TRAINING PROGRAM

## 2023-03-15 ENCOUNTER — CLINICAL SUPPORT (OUTPATIENT)
Dept: REHABILITATION | Facility: HOSPITAL | Age: 43
End: 2023-03-15
Payer: COMMERCIAL

## 2023-03-15 ENCOUNTER — DOCUMENTATION ONLY (OUTPATIENT)
Dept: REHABILITATION | Facility: HOSPITAL | Age: 43
End: 2023-03-15
Payer: COMMERCIAL

## 2023-03-15 DIAGNOSIS — G89.29 CHRONIC BILATERAL LOW BACK PAIN WITH RIGHT-SIDED SCIATICA: Primary | ICD-10-CM

## 2023-03-15 DIAGNOSIS — R53.1 WEAKNESS: ICD-10-CM

## 2023-03-15 DIAGNOSIS — M54.41 CHRONIC BILATERAL LOW BACK PAIN WITH RIGHT-SIDED SCIATICA: Primary | ICD-10-CM

## 2023-03-15 PROCEDURE — 97010 HOT OR COLD PACKS THERAPY: CPT | Mod: PN,CQ

## 2023-03-15 PROCEDURE — 97110 THERAPEUTIC EXERCISES: CPT | Mod: PN,CQ

## 2023-03-15 PROCEDURE — 97112 NEUROMUSCULAR REEDUCATION: CPT | Mod: PN,CQ

## 2023-03-15 PROCEDURE — 97140 MANUAL THERAPY 1/> REGIONS: CPT | Mod: PN,CQ

## 2023-03-15 PROCEDURE — 97014 ELECTRIC STIMULATION THERAPY: CPT | Mod: PN,CQ

## 2023-03-15 NOTE — PROGRESS NOTES
"  Physical Therapy Assistant Daily Treatment Note     Name: Sarah Brown   Clinic Number: [id number]    Therapy Diagnosis:   Encounter Diagnoses   Name Primary?    Chronic bilateral low back pain with right-sided sciatica Yes    Weakness          Physician: Jennifer Rose MD    Visit Date: 3/15/2023    Physician Orders: PT Eval and Treat  Medical Diagnosis from Referral: Osteoarthritis of spine with radiculopathy, lumbar region [M47.26]  Evaluation Date: 1/27/2023  Authorization Period Expiration: 12/31/2022  Plan of Care Expiration: 3/24/2023  Visit # / Visits authorized: 11/20  FOTO: 2/3    Precautions: Standard    Time In: 11:30 am   Time Out: 12:15 pm   Total Billable Time: 45 minutes    SUBJECTIVE     Today, pt reports: she had MRI done and they said she had some bulging discs. She has been having increased pain since Saturday and is complete course of medication. Patient returned to work on Monday.    She was compliant with home exercise program.  Response to previous treatment: no complaints  Functional change: n/a today     Pain: 5/10    Location: R hip/ low back    OBJECTIVE / TREATMENT     Objective measures to be updated at Updated POC unless specified otherwise.    Sarah received the following manual therapy techniques applied for (08) minutes, including:  Attempted lumbar and right hip distraction    Sarah received therapeutic exercises to develop strength, endurance, ROM, flexibility, posture, and core stabilization for (12)  minutes including:    Open books x 10  SKTC 3 x 20" B  LTR x 15 B   Piriformis stretch 3 x 20" B    Deferred:   Supine Hip Add ball squeeze 2 x 10  Supine Hip Abd against belt 2 x 10    Supine nerve glides (very gentle) x 10   Mod prayer stretch leaning on high mat x 5   Mod IT band stretch straight leg crossover x 5     Sarah participated in neuromuscular re-education activities to improve: Balance, Coordination, Kinesthetic, Sense, Proprioception, and Posture for (10)  " "minutes., including:    Side lying clams 2x10  Supine diaphragmatic breathing 3 minutes focus on lumbar muscle relaxation and core activation on exhale    PPT minimal motion x 10 5" holds     Deferred:  Supine TA activation 2 x 10 5" holds   + march x10 BLE  + bent knee fall out x10 BLE  Cat/ cow in quadruped with focus on breathing x 10  Paloff Press double yellow theraband 2x10    Sarah participated in dynamic functional therapeutic activities to improve functional performance for (0)  minutes, including:      Deferred:  Farmer's carry 15# KB  Sit to Stand 20 inch box 2x10  Dead lift 20# KB 2x10    Dry Needling Treatment: (NOT DONE TODAY 3/7)    Palpation Assessment to determine the necessity for Functional Dry Needling to R hip rotators and gluts.   Patient provided written and verbal consent to Functional Dry Needling YES  Written Handout on response to FDN provided: yes    Sarah demonstrated good  understanding of the education provided.   Patient demonstrated appropriate response to Functional Dry Needling. YES.      After exs., pt rec'd functional dry needling today as follows: FDN to B lumbar paraspinals using four 75 mm fusiform needles placed parallel. Needling was done without estim and with patient instructing therapist on symptoms upon insertion to allow deeper insertion once referral pain was diminished. Needles were left in-situ for 0 minutes once deepest point in muscles were reached.     Cold pack for 15 minutes to low back with IFC to decrease inflammation.    Home Exercises Provided and Patient Education Provided     Education/Self-Care provided:  Patient educated on biomechanical justification for therapeutic exercise and importance of compliance with HEP in order to improve overall impairments and QOL   Patient was educated on all the above exercise prior/during/after for proper posture, positioning, and execution for safe performance with home exercise program.    Patient was educated on the " anatomy around affected area and possible causes of pain.       Written Home Exercises Provided: Patient instructed to cont prior HEP.  Exercises were reviewed and Sarah was able to demonstrate them prior to the end of the session.  Sarah demonstrated good  understanding of the education provided.     See EMR under Patient Instructions for exercises provided prior visit.    ASSESSMENT   Patient presents with increased pain in low back and right hip this morning. Began treatment attempting manual therapy to improve mobility and decrease pain but patient has increased sensitivity to distraction techniques therefore deferred. Patient performed additional thoracic mobility and hip strengthening within tolerance. Continued deep core strengthening exercises but patient unable to tolerate supine weight bearing to right posterior hip due to significance of pain. Exercises deferred and modalities applied for pain relief and decreasing inflammation in low back.    Sarah is progressing well towards her goals.   Pt prognosis is Good.     Pt will continue to benefit from skilled outpatient physical therapy to address the deficits listed in the problem list box on initial evaluation, provide pt/family education and to maximize pt's level of independence in the home and community environment.     Pt's spiritual, cultural and educational needs considered and pt agreeable to plan of care and goals.     Anticipated Barriers for therapy: sedentary lifestyle and chronicity of condition    Short Term Goals:  4 weeks Progress   3/1/2023   Pain: Pt will demonstrate improved pain by reports of less than or equal to 7/10 worst pain on the verbal rating scale in order to progress toward maximal functional ability and improve QOL. PC   Function: Patient will demonstrate improved function as indicated by a functional limitation score of less than or equal to 45 out of 100 on FOTO. PC   HEP: Patient will demonstrate independence with HEP in  order to progress toward functional independence. MET      Long Term Goals:  8 weeks Progress  3/1/2023   Pain: Pt will demonstrate improved pain by reports of less than or equal to 4/10 worst pain on the verbal rating scale in order to progress toward maximal functional ability and improve QOL.   PC   Function: Patient will demonstrate improved function as indicated by a functional limitation score of less than or equal to 38 out of 100 on FOTO. PC   Strength: Patient will improve strength to stated goals, listed in objective measures above, in order to improve functional independence and quality of life. PC   Gait: Patient will demonstrate normalized gait mechanics with minimal compensation in order to return to PLOF. PC   Goals Key:  PC= progressing/continue; PM= partially met;        DC= discontinue    PLAN   Plan of care Certification: 1/27/2023 to 3/24/2023      Outpatient Physical Therapy 2 times weekly for 8 weeks to include any combination of the following interventions: virtual visits, dry needling, modalities, electrical stimulation (IFC, Pre-Mod, Attended with Functional Dry Needling), Cervical/Lumbar Traction, Gait Training, Manual Therapy, Neuromuscular Re-ed, Patient Education, Self Care, Therapeutic Activites, and Therapeutic Exercise      Continue Plan of Care (POC) and progress per patient tolerance. See treatment section for details on planned progressions next session.    Roslyn Rosa, PTA

## 2023-03-22 ENCOUNTER — CLINICAL SUPPORT (OUTPATIENT)
Dept: REHABILITATION | Facility: HOSPITAL | Age: 43
End: 2023-03-22
Payer: COMMERCIAL

## 2023-03-22 DIAGNOSIS — R53.1 WEAKNESS: ICD-10-CM

## 2023-03-22 DIAGNOSIS — G89.29 CHRONIC BILATERAL LOW BACK PAIN WITH RIGHT-SIDED SCIATICA: Primary | ICD-10-CM

## 2023-03-22 DIAGNOSIS — M54.41 CHRONIC BILATERAL LOW BACK PAIN WITH RIGHT-SIDED SCIATICA: Primary | ICD-10-CM

## 2023-03-22 PROCEDURE — 97110 THERAPEUTIC EXERCISES: CPT | Mod: PN

## 2023-03-22 PROCEDURE — 97112 NEUROMUSCULAR REEDUCATION: CPT | Mod: PN

## 2023-03-22 NOTE — PROGRESS NOTES
"  Physical Therapy  Daily Treatment Note     Name: Sarah Brown   Clinic Number: [id number]    Therapy Diagnosis:   Encounter Diagnoses   Name Primary?    Chronic bilateral low back pain with right-sided sciatica Yes    Weakness        Physician: Jennifer Rose MD    Visit Date: 3/22/2023    Physician Orders: PT Eval and Treat  Medical Diagnosis from Referral: Osteoarthritis of spine with radiculopathy, lumbar region [M47.26]  Evaluation Date: 1/27/2023  Authorization Period Expiration: 12/31/2022  Plan of Care Expiration: 3/24/2023  Visit # / Visits authorized: 12/20  FOTO: 2/3    Precautions: Standard    Time In: 9:53 am   Time Out: 10:43 pm   Total Billable Time: 50 minutes    SUBJECTIVE     Today, pt reports:  Pain is improved staying at about a 3/10 over the past week. Pt reports to neurologist on Monday.     She was compliant with home exercise program.  Response to previous treatment: no complaints  Functional change: n/a today     Pain: 3/10    Location: R hip/ low back    OBJECTIVE / TREATMENT     Objective measures to be updated at Updated POC unless specified otherwise.    Sarah received the following manual therapy techniques applied for (00) minutes, including:    Sarah received therapeutic exercises to develop strength, endurance, ROM, flexibility, posture, and core stabilization for (38)  minutes including:    Open books x 10  SKTC 3 x 20" B  LTR x 15 B   Piriformis stretch 3 x 20" B  Supine Hip Add ball squeeze 2 x 10  Supine Hip Abd against belt 2 x 10    R/L trunk flexion stretch ball roll outs x 10 B   Supine nerve glides (very gentle) x 10   Mod prayer stretch leaning on high mat x 5     Sarah participated in neuromuscular re-education activities to improve: Balance, Coordination, Kinesthetic, Sense, Proprioception, and Posture for (12)  minutes., including:    Side lying clams 2x10  Supine diaphragmatic breathing 3 minutes focus on lumbar muscle relaxation and core activation on exhale    PPT " "minimal motion x 10 5" holds   Supine TA activation + Swiss ball press 2 x 10 5" holds   + bent knee fall out x10 BLE    Deferred:   Cat/ cow in quadruped with focus on breathing x 10  Paloff Press double yellow theraband 2x10    Sarah participated in dynamic functional therapeutic activities to improve functional performance for (0)  minutes, including:      Deferred:  Farmer's carry 15# KB  Sit to Stand 20 inch box 2x10  Dead lift 20# KB 2x10    Cold pack for 15 minutes to low back with IFC to decrease inflammation.    Home Exercises Provided and Patient Education Provided     Education/Self-Care provided:  Patient educated on biomechanical justification for therapeutic exercise and importance of compliance with HEP in order to improve overall impairments and QOL   Patient was educated on all the above exercise prior/during/after for proper posture, positioning, and execution for safe performance with home exercise program.    Patient was educated on the anatomy around affected area and possible causes of pain.       Written Home Exercises Provided: Patient instructed to cont prior HEP.  Exercises were reviewed and Sarah was able to demonstrate them prior to the end of the session.  Sarah demonstrated good  understanding of the education provided.     See EMR under Patient Instructions for exercises provided prior visit.    ASSESSMENT   Patient presents today reporting improvement in symptoms over the last few days. Therefore, treatment today was progressed to further mobility tolerance and core stability. Patient does well with increased activity today. However, tolerance of pelvic tilts remains minimal. Pt reports no reproduction of lower extremity symptoms with treatment, but posterior pelvic tilts does provide a very uncomfortable pull in patient's lumbar region. Pt reports pain is somewhat lessened by end of session today and denied need for modalities.     Sarah is progressing well towards her goals. "   Pt prognosis is Good.     Pt will continue to benefit from skilled outpatient physical therapy to address the deficits listed in the problem list box on initial evaluation, provide pt/family education and to maximize pt's level of independence in the home and community environment.     Pt's spiritual, cultural and educational needs considered and pt agreeable to plan of care and goals.     Anticipated Barriers for therapy: sedentary lifestyle and chronicity of condition    Short Term Goals:  4 weeks Progress   3/1/2023   Pain: Pt will demonstrate improved pain by reports of less than or equal to 7/10 worst pain on the verbal rating scale in order to progress toward maximal functional ability and improve QOL. PC   Function: Patient will demonstrate improved function as indicated by a functional limitation score of less than or equal to 45 out of 100 on FOTO. PC   HEP: Patient will demonstrate independence with HEP in order to progress toward functional independence. MET      Long Term Goals:  8 weeks Progress  3/1/2023   Pain: Pt will demonstrate improved pain by reports of less than or equal to 4/10 worst pain on the verbal rating scale in order to progress toward maximal functional ability and improve QOL.   PC   Function: Patient will demonstrate improved function as indicated by a functional limitation score of less than or equal to 38 out of 100 on FOTO. PC   Strength: Patient will improve strength to stated goals, listed in objective measures above, in order to improve functional independence and quality of life. PC   Gait: Patient will demonstrate normalized gait mechanics with minimal compensation in order to return to PLOF. PC   Goals Key:  PC= progressing/continue; PM= partially met;        DC= discontinue    PLAN   Plan of care Certification: 1/27/2023 to 3/24/2023      Outpatient Physical Therapy 2 times weekly for 8 weeks to include any combination of the following interventions: virtual visits, dry  needling, modalities, electrical stimulation (IFC, Pre-Mod, Attended with Functional Dry Needling), Cervical/Lumbar Traction, Gait Training, Manual Therapy, Neuromuscular Re-ed, Patient Education, Self Care, Therapeutic Activites, and Therapeutic Exercise      Continue Plan of Care (POC) and progress per patient tolerance. See treatment section for details on planned progressions next session.    Evelyn Miller, PT

## 2023-03-27 ENCOUNTER — OFFICE VISIT (OUTPATIENT)
Dept: PAIN MEDICINE | Facility: CLINIC | Age: 43
End: 2023-03-27
Payer: COMMERCIAL

## 2023-03-27 DIAGNOSIS — M54.16 LUMBAR RADICULOPATHY: Primary | ICD-10-CM

## 2023-03-27 PROCEDURE — 1160F PR REVIEW ALL MEDS BY PRESCRIBER/CLIN PHARMACIST DOCUMENTED: ICD-10-PCS | Mod: CPTII,95,, | Performed by: PHYSICIAN ASSISTANT

## 2023-03-27 PROCEDURE — 1159F MED LIST DOCD IN RCRD: CPT | Mod: CPTII,95,, | Performed by: PHYSICIAN ASSISTANT

## 2023-03-27 PROCEDURE — 1159F PR MEDICATION LIST DOCUMENTED IN MEDICAL RECORD: ICD-10-PCS | Mod: CPTII,95,, | Performed by: PHYSICIAN ASSISTANT

## 2023-03-27 PROCEDURE — 3044F PR MOST RECENT HEMOGLOBIN A1C LEVEL <7.0%: ICD-10-PCS | Mod: CPTII,95,, | Performed by: PHYSICIAN ASSISTANT

## 2023-03-27 PROCEDURE — 99214 PR OFFICE/OUTPT VISIT, EST, LEVL IV, 30-39 MIN: ICD-10-PCS | Mod: 95,,, | Performed by: PHYSICIAN ASSISTANT

## 2023-03-27 PROCEDURE — 1160F RVW MEDS BY RX/DR IN RCRD: CPT | Mod: CPTII,95,, | Performed by: PHYSICIAN ASSISTANT

## 2023-03-27 PROCEDURE — 3044F HG A1C LEVEL LT 7.0%: CPT | Mod: CPTII,95,, | Performed by: PHYSICIAN ASSISTANT

## 2023-03-27 PROCEDURE — 99214 OFFICE O/P EST MOD 30 MIN: CPT | Mod: 95,,, | Performed by: PHYSICIAN ASSISTANT

## 2023-03-27 NOTE — PROGRESS NOTES
Est Patient Chronic Pain Note (Follow up Visit)    Referring Physician: No ref. provider found    PCP: Jennifer Rose MD    Chief Complaint:   No chief complaint on file.       SUBJECTIVE:    Interval History (3/27/2023):  Sarah Brown presents today for follow-up visit to review MRI.  Patient was last seen on 3/7/2023. At that visit, the plan was to order MRI of lumbar spine to further evaluate radicular symptoms. Patient reports pain as 5/10 today. MRI consistent with mild multilevel degenerative changes with mild edema within the right L5 pedicle which may be a source of pain. Pain is described as pulling, numbness in nature.  Patient reports pain in a bandlike distribution in the lower back which radiates down the lateral and posterior aspects of bilateral lower extremities, right  worse than on the left.  Pain is exacerbated with prolonged sitting, moving from sitting to standing and with ambulating even a few steps. She reports intermittent swelling along her right lower extremity, not improved by elevating lower extremities. Patient previously started on Gabapentin by Dr. Gray, taking 300 mg TID, which she reports offers some relief from her symptoms. Pain interferes with daily activities.    MRI Lumbar spine 03/09/2023  FINDINGS:  There are 5 non-rib-bearing lumbar vertebrae.  Alignment is unremarkable with no significant listhesis.  No acute fracture or compression deformity.  No aggressive focal signal abnormality.  Mild degenerative endplate osteophytosis noted.  Mild edema within the right L5 pedicle.     Conus medullaris terminates at the L2 level.  Conus medullaris is normal in size and signal.     T12-L1: No significant disc pathology.  No significant spinal canal or neural foraminal stenosis.     L1-L2: No significant disc pathology.  No significant spinal canal or neural foraminal stenosis.     L2-L3: Trace annular disc bulge.  Mild bilateral facet arthropathy.  No significant spinal canal or  neural foraminal stenosis.     L3-L4: Trace annular disc bulge.  Mild bilateral facet arthropathy.  No significant spinal canal or neural foraminal stenosis.     L4-L5: Disc desiccation and small circumferential disc bulge.  Bilateral facet arthropathy with ligamentum flavum thickening.  No significant spinal canal stenosis.  Mild bilateral neural foraminal stenosis.     L5-S1: Trace annular disc bulge.  Mild bilateral facet arthropathy.  No significant spinal canal or neural foraminal stenosis.     Paraspinal soft tissues are unremarkable.  Visualized intra-abdominal and pelvic contents are also unremarkable.     Impression:     Mild multilevel degenerative changes as detailed above including mild edema within the right L5 pedicle which may be a source of pain.     No significant spinal canal stenosis.  Mild bilateral neural foraminal stenosis at L4-L5.    Initial HPI 03/07/2023  Sarah Brown is a 42 y.o. female with past medical history significant for migraines, hypertension, obesity who presents to the clinic for the evaluation of lower back and leg pain.  Patient reports pain has been present for the last 2-3 years without inciting accident injury or trauma.  Pain has been particularly severe over the last few months.  Pain is intermittent and today is rated a 10/10.  Pain is described as pulling, numbness in nature.  Patient reports pain in a bandlike distribution in the lower back which radiates down the lateral and posterior aspects of bilateral lower extremities in L4-S2 distribution to the calf.  Pain is more severe on the right than on the left.  Pain is exacerbated with prolonged sitting, moving from sitting to standing and with ambulating even a few steps.  Patient does endorse associated weakness in the right lower extremity associated with her pain.  Pain is marginally improved with sitting on a pillow.  Patient has completed 10 sessions of physical therapy from 01/27/2023 through 03/06/2023 without  any meaningful improvement in her lower back or leg pain.  Patient was started on gabapentin 300 mg 3 times daily by Dr. Gray starting today as well as Toradol 10 mg up to twice daily.  Patient has not trialed membrane stabilizing agents prior to this.  Patient has not received prior intervention.    Patient reports significant motor weakness and loss of sensations.  Patient denies night fever/night sweats, urinary incontinence, bowel incontinence, and significant weight loss.      Pain Disability Index Review:     Last 3 PDI Scores 3/7/2023   Pain Disability Index (PDI) 60       Non-Pharmacologic Treatments:  Physical Therapy/Home Exercise: yes  Ice/Heat:no  TENS: no  Acupuncture: no  Massage: no  Chiropractic: no    Other: no      Pain Medications:  - Adjuvant Medications: Neurontin (Gabapentin) and Toradol (Ketorolac)    Pain Procedures:   None    Past Medical History:   Diagnosis Date    Hypertension     diet controled    Migraines     with aura, per pt     Past Surgical History:   Procedure Laterality Date    TUBAL LIGATION  9/26/14     Review of patient's allergies indicates:   Allergen Reactions    Gluten protein Itching       Current Outpatient Medications   Medication Sig    fluticasone propionate (FLONASE) 50 mcg/actuation nasal spray USE 1 SPRAY (50 MCG TOTAL) IN EACH NOSTRIL ONCE DAILY    gabapentin (NEURONTIN) 300 MG capsule Take 1 capsule (300 mg total) by mouth 3 (three) times daily.    hydroCHLOROthiazide (HYDRODIURIL) 25 MG tablet Take 1 tablet (25 mg total) by mouth once daily.    methylPREDNISolone (MEDROL DOSEPACK) 4 mg tablet use as directed    naproxen (NAPROSYN) 500 MG tablet Take 1 tablet (500 mg total) by mouth 2 (two) times daily as needed.    NIFEdipine (PROCARDIA-XL) 60 MG (OSM) 24 hr tablet Take 1 tablet (60 mg total) by mouth 2 (two) times a day.     No current facility-administered medications for this visit.       Review of Systems     GENERAL:  No weight loss, malaise or  fevers.  HEENT:   No recent changes in vision or hearing  NECK:  Negative for lumps, no difficulty with swallowing.  RESPIRATORY:  Negative for cough, wheezing or shortness of breath, patient denies any recent URI.  CARDIOVASCULAR:  Negative for chest pain or palpitations.  GI:  Negative for abdominal discomfort, blood in stools or black stools or change in bowel habits.  MUSCULOSKELETAL:  See HPI.  SKIN:  Negative for lesions, rash, and itching.  PSYCH:  No mood disorder or recent psychosocial stressors.   HEMATOLOGY/LYMPHOLOGY:  Negative for prolonged bleeding, bruising easily or swollen nodes.    NEURO:   No history of syncope, paralysis, seizures or tremors.  All other reviewed and negative other than HPI.    OBJECTIVE:    LMP 02/15/2023     Telemedicine Exam  There were no vitals filed for this visit.  There is no height or weight on file to calculate BMI.   (reviewed on 3/27/2023)     GENERAL: Well appearing, in no acute distress, alert and oriented x3.  Cooperative.  PSYCH:  Mood and affect appropriate.  SKIN: Skin color & texture with no obvious abnormalities.    HEAD/FACE:  Normocephalic, atraumatic.    PULM:  No difficulty breathing. No nasal flaring. No obvious wheezing.  EXTREMITIES: No obvious deformities. Moving all extremities well, appears to have symmetric strength throughout.  MUSCULOSKELETAL: No obvious atrophy abnormalities are noted.   NEURO: No obvious neurologic deficit.   GAIT: sitting.     Physical Exam: last in clinic visit:    Physical Exam    GENERAL: Well appearing, in no acute distress, alert and oriented x3.  PSYCH:  Mood and affect appropriate.  SKIN: Skin color, texture, turgor normal, no rashes or lesions.  HEAD/FACE:  Normocephalic, atraumatic. Cranial nerves grossly intact.    CV: RRR with palpation of the radial artery.  PULM: No evidence of respiratory difficulty, symmetric chest rise.  GI:  Soft and non-tender.    BACK: Straight leg raising in the sitting and supine positions  is negative to radicular pain. pain to palpation over the facet joints of the lumbar spine or spinous processes. Normal range of motion without pain reproduction.  EXTREMITIES:  Peripheral joint range of motion is full but with pain in obvious instability in the right lower extremity.  No deformities, edema, or skin discoloration. Good capillary refill.  MUSCULOSKELETAL: Unable to stand on heels & toes.   hip, and knee provocative maneuvers are negative.    SIJ testing: bilateral  - TTP over SI joint: Present  - Tiera's/ Ubaldo's: Positive    - Sacroiliac Distraction Test (anterior pressure): Positive  - Sacroiliac Compression Test (lateral pressure): Positive   - SacralThrust Test (posterior pressure): Positive    Facet loading test is positive bilaterally.   Bilateral upper and lower extremity strength is normal and symmetric.  No atrophy or tone abnormalities are noted.    RIGHT Lower extremity: Hip flexion 4/5, Hip Abduction 4/5, Hip Adduction 4/5, Knee extension 4/5, Knee flexion 4/5, Ankle dorsiflexion 5/5, Extensor hallucis longus 5/5, Ankle plantarflexion 5/5  LEFT Lower extremity:  Hip flexion 5/5, Hip Abduction 5/5,Hip Adduction 5/5, Knee extension 5/5, Knee flexion 5/5, Ankle dorsiflexion 5/5, Extensor hallucis longus 5/5, Ankle plantarflexion 5/5  -Normal testing knee (patellar) jerk and ankle (achilles) jerk    NEURO: Bilateral upper and lower extremity coordination and muscle stretch reflexes are physiologic and symmetric. No loss of sensation is noted.  GAIT: normal.    Imagin16    X-Ray Lumbar Spine AP And Lateral    Narrative  Three views of the lumbar spine    Findings: The vertebral bodies demonstrate normal height.  There is very minimal dextroscoliosis of the thoracolumbar junction. Minimal disc space narrowing is suspected at the L5-S1 level. There appears to be some mild facet arthropathy within the lower lumbar spine.      ASSESSMENT: 42 y.o. year old female with lower back and  leg pain, consistent with     No diagnosis found.        PLAN:   - Interventions:  Schedule bilateral L4/5 TF MARÍA to address radicular symptoms. MRI consistent with mild multilevel degenerative changes with mild edema within the right L5 pedicle which may be a source of pain as well as bilateral facet arthropathy at L4/5 with foraminal narrowing.    - Anticoagulation use: no no anticoagulation     report:  Reviewed and consistent with medication use as prescribed.    - Medications:  -  We have discussed continuing Gabapentin 300 mg TID.  We have reviewed potential side effects of this medication including daytime somnolence, weight gain and peripheral edema      - Therapy:   We discussed continuing physical therapy to help manage the patient/s painful condition. The patient was counseled that muscle strengthening will improve the long term prognosis in regards to pain and may also help increase range of motion and mobility.     - Imaging: Reviewed available imaging with patient and answered any questions they had regarding study.  MRI lumbar spine to better evaluate severe pain and weakness    - Follow up visit: return to clinic in 4 weeks after injection      The above plan and management options were discussed at length with patient. Patient is in agreement with the above and verbalized understanding.    - I discussed the goals of interventional chronic pain management with the patient on today's visit. We discussed a multimodal and systematic approach to pain.  This includes diagnostic and therapeutic injections, adjuvant pharmacologic treatment, physical therapy, and at times psychiatry.  I emphasized the importance of regular exercise, core strengthening and stretching, diet and weight loss as a cornerstone of long-term pain management.    - This condition does not require this patient to take time off of work, and the primary goal of our Pain Management services is to improve the patient's functional  capacity.  - Patient Questions: Answered all of the patient's questions regarding diagnoses, therapy, treatment and next steps        Anum Brambila PA-C  Interventional Pain Management  [facility]    Disclaimer:  This note was prepared using voice recognition system and is likely to have sound alike errors that may have been overlooked even after proof reading.  Please call me with any questions

## 2023-03-27 NOTE — Clinical Note
Pain Provider: Riaz Patient Name: Sarah Brown MRN: [id number] Case: LUMBAR TFESI Level: L4/5 Laterality: bilateral Anticoagulation: None   4 week follow up  Anum

## 2023-03-29 ENCOUNTER — PATIENT MESSAGE (OUTPATIENT)
Dept: PAIN MEDICINE | Facility: CLINIC | Age: 43
End: 2023-03-29
Payer: COMMERCIAL

## 2023-03-29 ENCOUNTER — CLINICAL SUPPORT (OUTPATIENT)
Dept: REHABILITATION | Facility: HOSPITAL | Age: 43
End: 2023-03-29
Payer: COMMERCIAL

## 2023-03-29 DIAGNOSIS — G89.29 CHRONIC BILATERAL LOW BACK PAIN WITH RIGHT-SIDED SCIATICA: Primary | ICD-10-CM

## 2023-03-29 DIAGNOSIS — M54.41 CHRONIC BILATERAL LOW BACK PAIN WITH RIGHT-SIDED SCIATICA: Primary | ICD-10-CM

## 2023-03-29 DIAGNOSIS — R53.1 WEAKNESS: ICD-10-CM

## 2023-03-29 PROCEDURE — 97112 NEUROMUSCULAR REEDUCATION: CPT | Mod: PN

## 2023-03-29 PROCEDURE — 97110 THERAPEUTIC EXERCISES: CPT | Mod: PN

## 2023-03-29 NOTE — PLAN OF CARE
Outpatient Therapy Updated Plan of Care     Visit Date: 3/29/2023  Name: Sarah Brown   Clinic Number: [id number]    Therapy Diagnosis:   Encounter Diagnoses   Name Primary?    Chronic bilateral low back pain with right-sided sciatica Yes    Weakness      Physician: Jennifer Rose MD    Physician Orders: PT Eval and Treat  Medical Diagnosis from Referral: Osteoarthritis of spine with radiculopathy, lumbar region [M47.26]  Evaluation Date: 1/27/2023  Authorization Period Expiration: 1/25/2024  Plan of Care Expiration: 3/24/2023  Visit # / Visits authorized: 13/20 + eval  FOTO: 1/3     Precautions: Standard    Cancelled Visits: 4     Time In: 8:12 am  Time Out: 9:00 am  Total Billable Time (timed & untimed codes): 48 minutes    Subjective     Update: Patient reports she is continuing to have improvements in symptoms and activity tolerance though she remains limited. Patient states pain has not reached above 5/10 in the past week. She continues to struggle at work with certain activity, but she can now tolerate a full work day. Patient states she is waiting to hear about scheduling an injection that will hopefully improve pain so she can focus more on strengthening. Patient continues to report compliance with HEP is improving as symptoms are more controled.      Pain: 3/10    Location: R hip/ low back    Objective     Update:     RANGE OF MOTION:     Lumbar Right  (spine) Left    Pain/Dysfunction with Movement   Lumbar Flexion  100% --- Pulling pain    Lumbar Extension  100% --- Pain in low back    Lumbar Side Bending  75% 75% Pulling pain CL with bilateral motion     STRENGTH:     L/E MMT Right Left Pain/Dysfunction with Movement Current 3/29 Goal   Hip Flexion  4/5 4/5 Pain at R leg and low back with R 4/5 B 4+/5 B   Hip Abduction  3+/5 NT Pain reproduction  4/5 B pain on R  4+/5 B   Knee Extension 4/5 4+/5 --- 5/5 B  5/5 B   Knee Flexion 4/5 4/5 Pain reproduction on R 5/5 B  5/5 B      CMS  "Impairment/Limitation/Restriction for FOTO Lumbar Survey    Therapist reviewed FOTO scores for Sarah Brown on 3/29/2023.   FOTO documents entered into EPIC - see Media section.    Limitation Score: 43%        Treatment      Sarah received the following manual therapy techniques applied for (00) minutes, including:     Sarah received therapeutic exercises to develop strength, endurance, ROM, flexibility, posture, and core stabilization for (38)  minutes including:     Open books x 10  DKTC 3 x 20" B  LTR x 15 B   Piriformis stretch 3 x 20" B  Supine Hip Add ball squeeze and Supine Hip Abd against belt combo 3 minutes 5" holds  R/L trunk flexion stretch ball roll outs x 10 B   Supine nerve glides (very gentle) x 10   Mod prayer stretch leaning on high mat x 5      Sarah participated in neuromuscular re-education activities to improve: Balance, Coordination, Kinesthetic, Sense, Proprioception, and Posture for (10)  minutes., including:     Side lying clams 2x10  PPT minimal motion 2 x 10 5" holds   Supine TA activation + Swiss ball press 2 x 10 5" holds   Mod dead bugs into Swiss ball x 10 B     Deferred:   Cat/ cow in quadruped with focus on breathing x 10  Paloff Press double yellow theraband 2x10  Bent knee fall out x10 BLE     Sarah participated in dynamic functional therapeutic activities to improve functional performance for (0)  minutes, including:        Deferred:  Farmer's carry 15# KB  Sit to Stand 20 inch box 2x10  Dead lift 20# KB 2x10     Cold pack for 15 minutes to low back with IFC to decrease inflammation.     Home Exercises Provided and Patient Education Provided      Education/Self-Care provided:  Patient educated on biomechanical justification for therapeutic exercise and importance of compliance with HEP in order to improve overall impairments and QOL   Patient was educated on all the above exercise prior/during/after for proper posture, positioning, and execution for safe performance with " home exercise program.    Patient was educated on the anatomy around affected area and possible causes of pain.         Written Home Exercises Provided: Patient instructed to cont prior HEP.  Exercises were reviewed and Sarah was able to demonstrate them prior to the end of the session.  Sarah demonstrated good  understanding of the education provided.      See EMR under Patient Instructions for exercises provided prior visit.     Assessment   Sarah is progressing well with physical therapy, with moderate complaints of pain or discomfort and significant improvements noted in lower extremity strength, activity tolerance, static positional tolerance, core stability and pain since initial evaluation; please see exam for details. Sarah continues to have difficulty with walking, bending, lifting and stair ambulation, due to limitations that remain in mobility/flexibility, strength, and stability.  FOTO scores noted above indicate the patients perceived functional levels are improving since prior assessment. The above impairments and functional deficits will continue to be addressed over the course of this plan of care. Sarah was educated on continued progression of PT, expectations for the duration of care, updates on goals set at initial evaluation, and home exercise program.  Sarah will continue to benefit from physical therapy in order to further address goals, maximize function and quality of life.        Short Term Goals:  4 weeks Progress   3/29/2023   Pain: Pt will demonstrate improved pain by reports of less than or equal to 7/10 worst pain on the verbal rating scale in order to progress toward maximal functional ability and improve QOL. MET   Function: Patient will demonstrate improved function as indicated by a functional limitation score of less than or equal to 45 out of 100 on FOTO. MET   HEP: Patient will demonstrate independence with HEP in order to progress toward functional independence. MET       Long Term Goals:  8 weeks Progress  3/29/2023   Pain: Pt will demonstrate improved pain by reports of less than or equal to 4/10 worst pain on the verbal rating scale in order to progress toward maximal functional ability and improve QOL.   PC   Function: Patient will demonstrate improved function as indicated by a functional limitation score of less than or equal to 38 out of 100 on FOTO. PC   Strength: Patient will improve strength to stated goals, listed in objective measures above, in order to improve functional independence and quality of life. PC   Gait: Patient will demonstrate normalized gait mechanics with minimal compensation in order to return to PLOF. PC   Goals Key:  PC= progressing/continue;        PM= partially met;             DC= discontinue    Long Term Goal Status:   continue per initial plan of care.  Reasons for Recertification of Therapy:   Pt will continue to benefit from skilled outpatient physical therapy to address the deficits listed in the problem list box on initial evaluation, provide pt/family education and to maximize pt's level of independence in the home and community environment.     Plan     Update Certification Period: 3/29/2023 to 4/26/2023  Recommended Treatment Plan: 2 times per week for 4 weeks: Aquatic Therapy, Cervical/Lumbar Traction, Electrical Stimulation  , Gait Training, Manual Therapy, Moist Heat/ Ice, Neuromuscular Re-ed, Patient Education, Self Care, Therapeutic Exercise, Ultrasound, and Therapeutic Activites    Evelyn Miller, PT, DPT      I CERTIFY THE NEED FOR THESE SERVICES FURNISHED UNDER THIS PLAN OF TREATMENT AND WHILE UNDER MY CARE    Physician's comments:        Physician's Signature: ___________________________________________________

## 2023-04-05 ENCOUNTER — CLINICAL SUPPORT (OUTPATIENT)
Dept: REHABILITATION | Facility: HOSPITAL | Age: 43
End: 2023-04-05
Payer: COMMERCIAL

## 2023-04-05 DIAGNOSIS — M54.41 CHRONIC BILATERAL LOW BACK PAIN WITH RIGHT-SIDED SCIATICA: Primary | ICD-10-CM

## 2023-04-05 DIAGNOSIS — R53.1 WEAKNESS: ICD-10-CM

## 2023-04-05 DIAGNOSIS — G89.29 CHRONIC BILATERAL LOW BACK PAIN WITH RIGHT-SIDED SCIATICA: Primary | ICD-10-CM

## 2023-04-05 PROCEDURE — 97112 NEUROMUSCULAR REEDUCATION: CPT | Mod: PN

## 2023-04-05 PROCEDURE — 97110 THERAPEUTIC EXERCISES: CPT | Mod: PN

## 2023-04-05 NOTE — PROGRESS NOTES
"  Physical Therapy  Daily Treatment Note     Name: Sarah Brown   Clinic Number: [id number]    Therapy Diagnosis:   Encounter Diagnoses   Name Primary?    Chronic bilateral low back pain with right-sided sciatica Yes    Weakness        Physician: Jennifer Rose MD    Visit Date: 4/5/2023    Physician Orders: PT Eval and Treat  Medical Diagnosis from Referral: Osteoarthritis of spine with radiculopathy, lumbar region [M47.26]  Evaluation Date: 1/27/2023  Authorization Period Expiration: 12/31/2022  Plan of Care Expiration: 4/26/2023  Visit # / Visits authorized: 14/20  FOTO: 2/3    Precautions: Standard    Time In: 9:35 am   Time Out: 10:40 pm   Total Billable Time: 65 minutes    SUBJECTIVE     Today, pt reports: pain was about a 5/10 this past week mainly after working. Injection is scheduled for April 12th.     She was compliant with home exercise program.  Response to previous treatment: no complaints  Functional change: n/a today     Pain: 4/10    Location: R hip/ low back    OBJECTIVE / TREATMENT     Objective measures to be updated at Updated POC unless specified otherwise.    Sarah received the following manual therapy techniques applied for (00) minutes, including:    Sarah received therapeutic exercises to develop strength, endurance, ROM, flexibility, posture, and core stabilization for (25)  minutes including:    Open books x 10  SKTC 3 x 20" B  LTR x 15 B   Piriformis stretch 3 x 20" B  Supine Hip Add ball squeeze 2 x 10  Supine Hip Abd against belt 2 x 10    R/L trunk flexion stretch ball roll outs x 10 B   Supine nerve glides (very gentle) x 10     Sarah participated in neuromuscular re-education activities to improve: Balance, Coordination, Kinesthetic, Sense, Proprioception, and Posture for (40)  minutes., including:    Side lying clams 2x10  Mod bridges from bolster x 10 10" holds  Quad sets submax x 10 5" holds  SAQ x 10 x 10"   Supine diaphragmatic breathing 3 minutes focus on lumbar muscle " "relaxation and core activation on exhale    PPT minimal motion x 10 5" holds   Supine TA activation + Swiss ball press 2 x 10 5" holds   + bent knee fall out x10 BLE  Education on neuromotor control and muscle activation     Sarah participated in dynamic functional therapeutic activities to improve functional performance for (0)  minutes, including:      Deferred:  Farmer's carry 15# KB  Sit to Stand 20 inch box 2x10  Dead lift 20# KB 2x10    Cold pack for 15 minutes to low back with IFC to decrease inflammation.    Home Exercises Provided and Patient Education Provided     Education/Self-Care provided:  Patient educated on biomechanical justification for therapeutic exercise and importance of compliance with HEP in order to improve overall impairments and QOL   Patient was educated on all the above exercise prior/during/after for proper posture, positioning, and execution for safe performance with home exercise program.    Patient was educated on the anatomy around affected area and possible causes of pain.       Written Home Exercises Provided: Patient instructed to cont prior HEP.  Exercises were reviewed and Sarah was able to demonstrate them prior to the end of the session.  Sarah demonstrated good  understanding of the education provided.     See EMR under Patient Instructions for exercises provided prior visit.    ASSESSMENT   Patient presents reporting pain has continued to stay controlled. Started more of an attempt at strengthening due to this improvement. Patient continues to have trouble activating other muscles without activating low back . Cueing and education continues to be need and patient is only able to perform submax contractions at the R lower extremity and core before low back musculature takes over. Will continue with mobility exercises to help calm muscle tension and irritability while, also continuing to focus on proper muscle activation and relaxation with activity.     Sarah is " progressing well towards her goals.   Pt prognosis is Good.     Pt will continue to benefit from skilled outpatient physical therapy to address the deficits listed in the problem list box on initial evaluation, provide pt/family education and to maximize pt's level of independence in the home and community environment.     Pt's spiritual, cultural and educational needs considered and pt agreeable to plan of care and goals.     Anticipated Barriers for therapy: sedentary lifestyle and chronicity of condition    Short Term Goals:  4 weeks Progress   3/29/2023   Pain: Pt will demonstrate improved pain by reports of less than or equal to 7/10 worst pain on the verbal rating scale in order to progress toward maximal functional ability and improve QOL. MET   Function: Patient will demonstrate improved function as indicated by a functional limitation score of less than or equal to 45 out of 100 on FOTO. MET   HEP: Patient will demonstrate independence with HEP in order to progress toward functional independence. MET      Long Term Goals:  8 weeks Progress  3/29/2023   Pain: Pt will demonstrate improved pain by reports of less than or equal to 4/10 worst pain on the verbal rating scale in order to progress toward maximal functional ability and improve QOL.   PC   Function: Patient will demonstrate improved function as indicated by a functional limitation score of less than or equal to 38 out of 100 on FOTO. PC   Strength: Patient will improve strength to stated goals, listed in objective measures above, in order to improve functional independence and quality of life. PC   Gait: Patient will demonstrate normalized gait mechanics with minimal compensation in order to return to PLOF. PC   Goals Key:  PC= progressing/continue;        PM= partially met;             DC= discontinue    PLAN   Update Certification Period: 3/29/2023 to 4/26/2023  Recommended Treatment Plan: 2 times per week for 4 weeks: Aquatic Therapy,  Cervical/Lumbar Traction, Electrical Stimulation  , Gait Training, Manual Therapy, Moist Heat/ Ice, Neuromuscular Re-ed, Patient Education, Self Care, Therapeutic Exercise, Ultrasound, and Therapeutic Activites    Continue Plan of Care (POC) and progress per patient tolerance. See treatment section for details on planned progressions next session.    Evelyn Miller, PT

## 2023-04-12 ENCOUNTER — HOSPITAL ENCOUNTER (OUTPATIENT)
Facility: HOSPITAL | Age: 43
Discharge: HOME | End: 2023-04-12
Attending: ANESTHESIOLOGY | Admitting: ANESTHESIOLOGY
Payer: COMMERCIAL

## 2023-04-12 VITALS
OXYGEN SATURATION: 97 % | HEART RATE: 60 BPM | RESPIRATION RATE: 15 BRPM | WEIGHT: 156.31 LBS | DIASTOLIC BLOOD PRESSURE: 83 MMHG | HEIGHT: 63 IN | TEMPERATURE: 97 F | BODY MASS INDEX: 27.7 KG/M2 | SYSTOLIC BLOOD PRESSURE: 129 MMHG

## 2023-04-12 DIAGNOSIS — M54.16 LUMBAR RADICULOPATHY: ICD-10-CM

## 2023-04-12 LAB
B-HCG UR QL: NEGATIVE
CTP QC/QA: YES

## 2023-04-12 PROCEDURE — 64483 NJX AA&/STRD TFRM EPI L/S 1: CPT | Mod: 50,,, | Performed by: ANESTHESIOLOGY

## 2023-04-12 PROCEDURE — 25000003 PHARM REV CODE 250: Performed by: ANESTHESIOLOGY

## 2023-04-12 PROCEDURE — 64483 NJX AA&/STRD TFRM EPI L/S 1: CPT | Mod: LT | Performed by: ANESTHESIOLOGY

## 2023-04-12 PROCEDURE — 81025 URINE PREGNANCY TEST: CPT | Performed by: ANESTHESIOLOGY

## 2023-04-12 PROCEDURE — 25500020 PHARM REV CODE 255: Performed by: ANESTHESIOLOGY

## 2023-04-12 PROCEDURE — 63600175 PHARM REV CODE 636 W HCPCS: Performed by: ANESTHESIOLOGY

## 2023-04-12 PROCEDURE — 64483 PR EPIDURAL INJ, ANES/STEROID, TRANSFORAMINAL, LUMB/SACR, SNGL LEVL: ICD-10-PCS | Mod: 50,,, | Performed by: ANESTHESIOLOGY

## 2023-04-12 RX ORDER — MIDAZOLAM HYDROCHLORIDE 1 MG/ML
INJECTION, SOLUTION INTRAMUSCULAR; INTRAVENOUS
Status: DISCONTINUED | OUTPATIENT
Start: 2023-04-12 | End: 2023-04-12 | Stop reason: HOSPADM

## 2023-04-12 RX ORDER — INDOMETHACIN 25 MG/1
CAPSULE ORAL
Status: DISCONTINUED | OUTPATIENT
Start: 2023-04-12 | End: 2023-04-12 | Stop reason: HOSPADM

## 2023-04-12 RX ORDER — FENTANYL CITRATE 50 UG/ML
INJECTION, SOLUTION INTRAMUSCULAR; INTRAVENOUS
Status: DISCONTINUED | OUTPATIENT
Start: 2023-04-12 | End: 2023-04-12 | Stop reason: HOSPADM

## 2023-04-12 RX ORDER — DEXAMETHASONE SODIUM PHOSPHATE 10 MG/ML
INJECTION INTRAMUSCULAR; INTRAVENOUS
Status: DISCONTINUED | OUTPATIENT
Start: 2023-04-12 | End: 2023-04-12 | Stop reason: HOSPADM

## 2023-04-12 RX ORDER — BUPIVACAINE HYDROCHLORIDE 2.5 MG/ML
INJECTION, SOLUTION EPIDURAL; INFILTRATION; INTRACAUDAL
Status: DISCONTINUED | OUTPATIENT
Start: 2023-04-12 | End: 2023-04-12 | Stop reason: HOSPADM

## 2023-04-12 NOTE — DISCHARGE SUMMARY
Discharge Note  Short Stay      SUMMARY     Admit Date: 4/12/2023    Attending Physician: Doris Mello MD        Discharge Physician: Doris Mello MD        Discharge Date: 4/12/2023 12:37 PM    Procedure(s) (LRB):  Bilateral L4/5 TF MARÍA RN IV Sedation (Bilateral)    Final Diagnosis: Lumbar radiculopathy [M54.16]    Disposition: Home or self care    Patient Instructions:   Current Discharge Medication List        CONTINUE these medications which have NOT CHANGED    Details   hydroCHLOROthiazide (HYDRODIURIL) 25 MG tablet Take 1 tablet (25 mg total) by mouth once daily.  Qty: 90 tablet, Refills: 0    Comments: .  Associated Diagnoses: Essential hypertension      fluticasone propionate (FLONASE) 50 mcg/actuation nasal spray SPRAY 1 SPRAY (50 MCG TOTAL) INTO INTO EACH NOSTRIL EVERY DAY  Qty: 32 mL, Refills: 3    Associated Diagnoses: Environmental and seasonal allergies      gabapentin (NEURONTIN) 300 MG capsule Take 1 capsule (300 mg total) by mouth 3 (three) times daily.  Qty: 90 capsule, Refills: 1    Associated Diagnoses: Lumbar radiculopathy      methylPREDNISolone (MEDROL DOSEPACK) 4 mg tablet use as directed  Qty: 1 each, Refills: 0      naproxen (NAPROSYN) 500 MG tablet Take 1 tablet (500 mg total) by mouth 2 (two) times daily as needed.  Qty: 30 tablet, Refills: 0    Associated Diagnoses: Osteoarthritis of spine with radiculopathy, lumbar region      NIFEdipine (PROCARDIA-XL) 60 MG (OSM) 24 hr tablet Take 1 tablet (60 mg total) by mouth 2 (two) times a day.  Qty: 180 tablet, Refills: 0    Comments: .  Associated Diagnoses: Essential hypertension                 Discharge Diagnosis: Lumbar radiculopathy [M54.16]  Condition on Discharge: Stable with no complications to procedure   Diet on Discharge: Same as before.  Activity: as per instruction sheet.  Discharge to: Home with a responsible adult.  Follow up: 2-4 weeks       Please call the office at [phone] if you experience any weakness or loss of  sensation, fever > 101.5, pain uncontrolled with oral medications, persistent nausea/vomiting/or diarrhea, redness or drainage from the incisions, or any other worrisome concerns. If physician on call was not reached or could not communicate with our office for any reason please go to the nearest emergency department

## 2023-04-12 NOTE — DISCHARGE INSTRUCTIONS

## 2023-04-12 NOTE — OP NOTE
Sarah Brown  42 y.o. female      Vitals:    04/12/23 1230   BP: 121/77   Pulse: (!) 59   Resp: 17   Temp:      Procedure Date: 04/12/2023        INFORMED CONSENT: The procedure, risks, benefits and options were discussed with patient. There are no contraindications to the procedure. The patient expressed understanding and agreed to proceed. The personnel performing the procedure was discussed. I verify that I personally obtained consent prior to the start of the procedure and the signed consent can be found on the patient's chart.       Anesthesia:   Conscious sedation provided by M.D    The patient was monitored with continuous pulse oximetry, EKG, and intermittent blood pressure monitors.  The patient was hemodynamically stable throughout the entire process was responsive to voice, and breathing spontaneously.  Supplemental O2 was provided at 2L/min via nasal cannula.  Patient was comfortable for the duration of the procedure. (See nurse documentation and case log for sedation time)    There was a total of 2mg IV Midazolam and 50mcg Fentanyl titrated for the procedure    Pre Procedure diagnosis: Lumbar radiculopathy [M54.16]  Post-Procedure diagnosis: SAME     Complications: None    Specimens: None      DESCRIPTION OF PROCEDURE: The patient was brought to the procedure room. IV access was obtained prior to the procedure. The patient was positioned prone on the fluoroscopy table. Continuous hemodynamic monitoring was initiated including blood pressure, EKG, and pulse oximetry. . The skin was prepped with chlorhexidine and draped in a sterile fashion. Skin anesthesia was achieved using a total of 10mL of lidocaine, 5mL over each respective injection site.     The  L4/5 transforaminal spaces were identified with fluoroscopy in the  AP, oblique, and lateral views.  A 22 gauge spinal quinke needle was then advanced into the area of the trans foraminal spaces bilateral with confirmation of proper needle position using  AP, oblique, and lateral fluoroscopic views. Once the needle tip was in the area of the transforaminal space, and there was no blood, CSF or paraesthesias,  1.5 mL of Omnipaque 300mg/ml was injected on bilateral for a total of 3mL.  Fluoroscopic imaging in the AP and lateral views revealed a clear outline of the spinal nerve with proximal spread of agent through the neural foramen into the epidural space. A total combination of 2 mL of Bupivicaine 0.25% and 10 mg dexamethasone was injected on each side for a total of 6 mL of injected medications with displacement of the contrast dye confirming that the medication went into the area of the transforaminal spaces bilateral. A sterile dressing was applied.   Patient tolerated the procedure well.    Patient was taken back to the recovery room for further observation.     The patient was discharged to home in stable condition

## 2023-04-19 ENCOUNTER — CLINICAL SUPPORT (OUTPATIENT)
Dept: REHABILITATION | Facility: HOSPITAL | Age: 43
End: 2023-04-19
Payer: COMMERCIAL

## 2023-04-19 DIAGNOSIS — R53.1 WEAKNESS: ICD-10-CM

## 2023-04-19 DIAGNOSIS — M54.41 CHRONIC BILATERAL LOW BACK PAIN WITH RIGHT-SIDED SCIATICA: Primary | ICD-10-CM

## 2023-04-19 DIAGNOSIS — G89.29 CHRONIC BILATERAL LOW BACK PAIN WITH RIGHT-SIDED SCIATICA: Primary | ICD-10-CM

## 2023-04-19 PROCEDURE — 97110 THERAPEUTIC EXERCISES: CPT | Mod: PN

## 2023-04-19 PROCEDURE — 97112 NEUROMUSCULAR REEDUCATION: CPT | Mod: PN

## 2023-04-19 NOTE — PROGRESS NOTES
"  Physical Therapy  Daily Treatment Note     Name: Sarah Brown   Clinic Number: [id number]    Therapy Diagnosis:   Encounter Diagnoses   Name Primary?    Chronic bilateral low back pain with right-sided sciatica Yes    Weakness        Physician: Jennifer Rose MD    Visit Date: 4/19/2023    Physician Orders: PT Eval and Treat  Medical Diagnosis from Referral: Osteoarthritis of spine with radiculopathy, lumbar region [M47.26]  Evaluation Date: 1/27/2023  Authorization Period Expiration: 12/31/2022  Plan of Care Expiration: 4/26/2023  Visit # / Visits authorized: 15/20  FOTO: 2/3    Precautions: Standard    Time In: 9:50 am   Time Out: 10:40 pm   Total Billable Time: 50 minutes    SUBJECTIVE     Today, pt reports: it has been a week since injection and she still has not noticed any improvement in pain. Pain remains in low back and right thigh.     She was partially compliant with home exercise program.  Response to previous treatment: no complaints  Functional change: n/a today     Pain: 4/10    Location: R hip/ low back    OBJECTIVE / TREATMENT     Objective measures to be updated at Updated POC unless specified otherwise.    Sarah received the following manual therapy techniques applied for (00) minutes, including:    Sarah received therapeutic exercises to develop strength, endurance, ROM, flexibility, posture, and core stabilization for (25)  minutes including:    SKTC 3 x 20" B  LTR x 15 B   Piriformis stretch 3 x 20" B  Supine Hip Add ball squeeze 2 x 10  Supine Hip Abd against belt 2 x 10    R/L trunk flexion stretch ball roll outs x 10 B   Supine nerve glides (very gentle) x 10     Sarah participated in neuromuscular re-education activities to improve: Balance, Coordination, Kinesthetic, Sense, Proprioception, and Posture for (30)  minutes., including:    Mod bridges from bolster x 10 10" holds  Quad sets submax x 10 5" holds  Supine diaphragmatic breathing 3 minutes focus on lumbar muscle relaxation and " "core activation on exhale    PPT minimal motion x 10 5" holds   Supine TA activation + Swiss ball press 2 x 10 5" holds   + bent knee fall out x10 BLE    Sarah participated in dynamic functional therapeutic activities to improve functional performance for (0)  minutes, including:      Deferred:  Farmer's carry 15# KB  Sit to Stand 20 inch box 2x10  Dead lift 20# KB 2x10      Home Exercises Provided and Patient Education Provided     Education/Self-Care provided:  Patient educated on biomechanical justification for therapeutic exercise and importance of compliance with HEP in order to improve overall impairments and QOL   Patient was educated on all the above exercise prior/during/after for proper posture, positioning, and execution for safe performance with home exercise program.    Patient was educated on the anatomy around affected area and possible causes of pain.       Written Home Exercises Provided: Patient instructed to cont prior HEP.  Exercises were reviewed and Sarah was able to demonstrate them prior to the end of the session.  Sarah demonstrated good  understanding of the education provided.     See EMR under Patient Instructions for exercises provided prior visit.    ASSESSMENT   Patient presents with no changes in symptoms today following injection a week ago. Patient states she attempted some home exercise in days following injection but had significantly more pain than prior with exercise so compliance was compromised. Patient continues to have trouble activating other muscles without activating low back . Cueing and education continues to be need and patient is only able to perform submax contractions at the R lower extremity and core before low back musculature takes over. Will continue with mobility exercises to help calm muscle tension and irritability while, also continuing to focus on proper muscle activation and relaxation with activity.     Sarah is progressing well towards her goals. "   Pt prognosis is Good.     Pt will continue to benefit from skilled outpatient physical therapy to address the deficits listed in the problem list box on initial evaluation, provide pt/family education and to maximize pt's level of independence in the home and community environment.     Pt's spiritual, cultural and educational needs considered and pt agreeable to plan of care and goals.     Anticipated Barriers for therapy: sedentary lifestyle and chronicity of condition    Short Term Goals:  4 weeks Progress   3/29/2023   Pain: Pt will demonstrate improved pain by reports of less than or equal to 7/10 worst pain on the verbal rating scale in order to progress toward maximal functional ability and improve QOL. MET   Function: Patient will demonstrate improved function as indicated by a functional limitation score of less than or equal to 45 out of 100 on FOTO. MET   HEP: Patient will demonstrate independence with HEP in order to progress toward functional independence. MET      Long Term Goals:  8 weeks Progress  3/29/2023   Pain: Pt will demonstrate improved pain by reports of less than or equal to 4/10 worst pain on the verbal rating scale in order to progress toward maximal functional ability and improve QOL.   PC   Function: Patient will demonstrate improved function as indicated by a functional limitation score of less than or equal to 38 out of 100 on FOTO. PC   Strength: Patient will improve strength to stated goals, listed in objective measures above, in order to improve functional independence and quality of life. PC   Gait: Patient will demonstrate normalized gait mechanics with minimal compensation in order to return to PLOF. PC   Goals Key:  PC= progressing/continue;        PM= partially met;             DC= discontinue    PLAN   Update Certification Period: 3/29/2023 to 4/26/2023  Recommended Treatment Plan: 2 times per week for 4 weeks: Aquatic Therapy, Cervical/Lumbar Traction, Electrical Stimulation   , Gait Training, Manual Therapy, Moist Heat/ Ice, Neuromuscular Re-ed, Patient Education, Self Care, Therapeutic Exercise, Ultrasound, and Therapeutic Activites    Continue Plan of Care (POC) and progress per patient tolerance. See treatment section for details on planned progressions next session.    Evelyn Miller, PT

## 2023-04-26 ENCOUNTER — CLINICAL SUPPORT (OUTPATIENT)
Dept: REHABILITATION | Facility: HOSPITAL | Age: 43
End: 2023-04-26
Payer: COMMERCIAL

## 2023-04-26 DIAGNOSIS — M54.41 CHRONIC BILATERAL LOW BACK PAIN WITH RIGHT-SIDED SCIATICA: Primary | ICD-10-CM

## 2023-04-26 DIAGNOSIS — G89.29 CHRONIC BILATERAL LOW BACK PAIN WITH RIGHT-SIDED SCIATICA: Primary | ICD-10-CM

## 2023-04-26 DIAGNOSIS — R53.1 WEAKNESS: ICD-10-CM

## 2023-04-26 PROCEDURE — 97112 NEUROMUSCULAR REEDUCATION: CPT | Mod: PN

## 2023-04-26 PROCEDURE — 97530 THERAPEUTIC ACTIVITIES: CPT | Mod: PN

## 2023-04-26 PROCEDURE — 97110 THERAPEUTIC EXERCISES: CPT | Mod: PN

## 2023-04-26 NOTE — PLAN OF CARE
"[facility]  Physical Therapy Discharge Note    Name: Sarah Brown  Clinic Number: [id number]    Therapy Diagnosis:   Encounter Diagnoses   Name Primary?    Chronic bilateral low back pain with right-sided sciatica Yes    Weakness      Physician: Jennifer Rose MD    Physician Orders: PT Eval and Treat  Medical Diagnosis from Referral: Osteoarthritis of spine with radiculopathy, lumbar region [M47.26]  Evaluation Date: 1/27/2023  Authorization Period Expiration: 12/31/2022  Plan of Care Expiration: 4/26/2023  Visit # / Visits authorized: 15/20  FOTO: 2/3     Precautions: Standard     Time In: 8:15 am   Time Out: 9:05 am   Total Billable Time: 50 minutes     SUBJECTIVE      Today, pt reports: pain has been about the same staying at about a 5/10. Patient states she feels like she is stronger in her core and has less intense pain since when she started therapy, but she remains like moderately limited.      She was compliant with home exercise program.  Response to previous treatment: no complaints  Functional change: n/a today      Pain: 4/10    Location: R hip/ low back     OBJECTIVE / TREATMENT         RANGE OF MOTION:     Lumbar Right  (spine) Left    Pain/Dysfunction with Movement   Lumbar Flexion  100% --- ---   Lumbar Extension  100% --- Pain in low back    Lumbar Side Bending  100% 100% R sided pain with R side flexion      STRENGTH:     L/E MMT Right Left Pain/Dysfunction with Movement Current 4/26 Goal   Hip Flexion  4/5 4/5 Pain at R leg and low back with R 4+/5 B 4+/5 B   Hip Abduction  3+/5 NT Pain reproduction  4+/5 B pain on R  4+/5 B   Knee Extension 4/5 4+/5 --- 5/5 B  5/5 B   Knee Flexion 4/5 4/5 Pain reproduction on R 5/5 B  5/5 B      Sarah received therapeutic exercises to develop strength, endurance, ROM, flexibility, posture, and core stabilization for (15)  minutes including:     SKTC 3 x 20" B  LTR x 15 B   Supine Hip Add ball squeeze 3 minutes  Supine Hip Abd against " "belt 3 minutes      Sarah participated in neuromuscular re-education activities to improve: Balance, Coordination, Kinesthetic, Sense, Proprioception, and Posture for (25)  minutes., including:     Mod bridges from bolster x 10 10" holds  SLR flex 2 x 8    Clams 2 x 8 GTB   PPT minimal motion 2 x 10 5" holds   Supine TA activation + Swiss ball press 2 x 10 5" holds   + bent knee fall out x10 BLE     Sarah participated in dynamic functional therapeutic activities to improve functional performance for (10)  minutes, including:     Sit to Stand 20 inch box 2x10  Dead lift 20# bag with instruction on technique and how to modify lifting techniques for better function at work.         Home Exercises Provided and Patient Education Provided      Education/Self-Care provided:  Patient educated on biomechanical justification for therapeutic exercise and importance of compliance with HEP in order to improve overall impairments and QOL   Patient was educated on all the above exercise prior/during/after for proper posture, positioning, and execution for safe performance with home exercise program.    Patient was educated    Written Home Exercises Provided: yes.  Exercises were reviewed and Sarah was able to demonstrate them prior to the end of the session.  Sarah demonstrated good  understanding of the education provided.      See EMR under Patient Instructions for exercises provided 4/26/2023.  ASSESSMENT    Sarah Brown is a 42 y.o. female who has completed 17 sessions of formal outpatient physical therapy for treatment of lumbar radiculopathy. Patient has since shown significant improvement in lower extremity strength, lumbar mobility, and activity tolerance allowing for better function with lifting, bending and standing.  FOTO scores assessed today show Sarah's overall functional status has improved with physical therapy treatment. Patient remains with limitations with lifting heavy objects and frequent bending and " transfers due to residual deficits in pain symptoms, neural tension, core stability, endurance and lower extremity strength . These residual limitations will continue to be addressed with home exercise program. Patient demonstrates independence with home program prior to discharge for safe transition to independent phase of care. Patient educated to follow up with MD is symptoms return or worsen.      Discharge reason: Patient has reached the maximum rehab potential for the present time    Discharge FOTO Score:     CMS Impairment/Limitation/Restriction for FOTO Lumbar Survey    Therapist reviewed FOTO scores for Sarah Brown on 4/26/2023.   FOTO documents entered into [employer] - see Media section.    Limitation Score: 48%          Goals:    Short Term Goals:  4 weeks Progress   4/26/2023   Pain: Pt will demonstrate improved pain by reports of less than or equal to 7/10 worst pain on the verbal rating scale in order to progress toward maximal functional ability and improve QOL. MET   Function: Patient will demonstrate improved function as indicated by a functional limitation score of less than or equal to 45 out of 100 on FOTO. MET   HEP: Patient will demonstrate independence with HEP in order to progress toward functional independence. MET      Long Term Goals:  8 weeks Progress  4/26/2023   Pain: Pt will demonstrate improved pain by reports of less than or equal to 4/10 worst pain on the verbal rating scale in order to progress toward maximal functional ability and improve QOL.   Not Met   Function: Patient will demonstrate improved function as indicated by a functional limitation score of less than or equal to 38 out of 100 on FOTO. Not Met   Strength: Patient will improve strength to stated goals, listed in objective measures above, in order to improve functional independence and quality of life. MET    Gait: Patient will demonstrate normalized gait mechanics with minimal compensation in order to return to PLOF. MET     Goals Key:  PC= progressing/continue;        PM= partially met;             DC= discontinue    PLAN   This patient is discharged from Physical Therapy      Evelyn Miller PT

## 2023-05-04 NOTE — PROGRESS NOTES
Est Patient Chronic Pain Note (Follow up Visit)    Chief Complaint:   No chief complaint on file.    Established Patient - TeleHealth Visit    The patient location is: [place]  The chief complaint leading to consultation is: chronic pain     Visit type: audiovisual    Face to Face time with patient: 10-15 minutes  20 minutes of total time spent on the encounter, which includes face to face time and non-face to face time preparing to see the patient (eg, review of tests), Obtaining and/or reviewing separately obtained history, Documenting clinical information in the electronic or other health record, Independently interpreting results (not separately reported) and communicating results to the patient/family/caregiver, or Care coordination (not separately reported).     Each patient to whom he or she provides medical services by telemedicine is:  (1) informed of the relationship between the physician and patient and the respective role of any other health care provider with respect to management of the patient; and (2) notified that he or she may decline to receive medical services by telemedicine and may withdraw from such care at any time.      SUBJECTIVE:  Interval History (5/8/2023): Sarah Brown presents today via telemed for follow-up visit.  she underwent  bilateral L4/5 TF MARÍA  on 4/12/23.  The patient reports that she is/was unchanged following the procedure.  she reports 0% pain relief.  She reports continued lower lumbosacral pain with radiation into her bilateral buttocks, lateral hips, and bilateral thighs.  Patient reports pain as 8/10 today. She completed physical therapy last week without any improvement in her pain. Her pain keeps her up at night and interferes with sleep and daily activities.    Interval History (3/27/2023):  Sarah Brown presents today for follow-up visit to review MRI.  Patient was last seen on 3/7/2023. At that visit, the plan was to order MRI of lumbar spine to further evaluate  radicular symptoms. Patient reports pain as 5/10 today. MRI consistent with mild multilevel degenerative changes with mild edema within the right L5 pedicle which may be a source of pain. Pain is described as pulling, numbness in nature.  Patient reports pain in a bandlike distribution in the lower back which radiates down the lateral and posterior aspects of bilateral lower extremities, right  worse than on the left.  Pain is exacerbated with prolonged sitting, moving from sitting to standing and with ambulating even a few steps. She reports intermittent swelling along her right lower extremity, not improved by elevating lower extremities. Patient previously started on Gabapentin by Dr. Gray, taking 300 mg TID, which she reports offers some relief from her symptoms. Pain interferes with daily activities.    MRI Lumbar spine 03/09/2023  FINDINGS:  There are 5 non-rib-bearing lumbar vertebrae.  Alignment is unremarkable with no significant listhesis.  No acute fracture or compression deformity.  No aggressive focal signal abnormality.  Mild degenerative endplate osteophytosis noted.  Mild edema within the right L5 pedicle.     Conus medullaris terminates at the L2 level.  Conus medullaris is normal in size and signal.     T12-L1: No significant disc pathology.  No significant spinal canal or neural foraminal stenosis.     L1-L2: No significant disc pathology.  No significant spinal canal or neural foraminal stenosis.     L2-L3: Trace annular disc bulge.  Mild bilateral facet arthropathy.  No significant spinal canal or neural foraminal stenosis.     L3-L4: Trace annular disc bulge.  Mild bilateral facet arthropathy.  No significant spinal canal or neural foraminal stenosis.     L4-L5: Disc desiccation and small circumferential disc bulge.  Bilateral facet arthropathy with ligamentum flavum thickening.  No significant spinal canal stenosis.  Mild bilateral neural foraminal stenosis.     L5-S1: Trace annular disc  bulge.  Mild bilateral facet arthropathy.  No significant spinal canal or neural foraminal stenosis.     Paraspinal soft tissues are unremarkable.  Visualized intra-abdominal and pelvic contents are also unremarkable.     Impression:     Mild multilevel degenerative changes as detailed above including mild edema within the right L5 pedicle which may be a source of pain.     No significant spinal canal stenosis.  Mild bilateral neural foraminal stenosis at L4-L5.    Initial HPI 03/07/2023  Sarah Brown is a 42 y.o. female with past medical history significant for migraines, hypertension, obesity who presents to the clinic for the evaluation of lower back and leg pain.  Patient reports pain has been present for the last 2-3 years without inciting accident injury or trauma.  Pain has been particularly severe over the last few months.  Pain is intermittent and today is rated a 10/10.  Pain is described as pulling, numbness in nature.  Patient reports pain in a bandlike distribution in the lower back which radiates down the lateral and posterior aspects of bilateral lower extremities in L4-S2 distribution to the calf.  Pain is more severe on the right than on the left.  Pain is exacerbated with prolonged sitting, moving from sitting to standing and with ambulating even a few steps.  Patient does endorse associated weakness in the right lower extremity associated with her pain.  Pain is marginally improved with sitting on a pillow.  Patient has completed 10 sessions of physical therapy from 01/27/2023 through 03/06/2023 without any meaningful improvement in her lower back or leg pain.  Patient was started on gabapentin 300 mg 3 times daily by Dr. Gray starting today as well as Toradol 10 mg up to twice daily.  Patient has not trialed membrane stabilizing agents prior to this.  Patient has not received prior intervention.    Patient reports significant motor weakness and loss of sensations.  Patient denies night  fever/night sweats, urinary incontinence, bowel incontinence, and significant weight loss.      Pain Disability Index Review:     Last 3 PDI Scores 3/7/2023   Pain Disability Index (PDI) 60       Non-Pharmacologic Treatments:  Physical Therapy/Home Exercise: yes  Ice/Heat:no  TENS: no  Acupuncture: no  Massage: no  Chiropractic: no    Other: no      Pain Medications:  - Adjuvant Medications: Neurontin (Gabapentin) and Toradol (Ketorolac)    Pain Procedures:   None    Past Medical History:   Diagnosis Date    Hypertension     diet controled    Migraines     with aura, per pt     Past Surgical History:   Procedure Laterality Date    SELECTIVE INJECTION OF ANESTHETIC AGENT AROUND LUMBAR SPINAL NERVE ROOT BY TRANSFORAMINAL APPROACH Bilateral 4/12/2023    Procedure: Bilateral L4/5 TF MARÍA RN IV Sedation;  Surgeon: Doris Mello MD;  [facility];  Service: Pain Management;  Laterality: Bilateral;    TUBAL LIGATION  9/26/14     Review of patient's allergies indicates:   Allergen Reactions    Gluten protein Itching       Current Outpatient Medications   Medication Sig    fluticasone propionate (FLONASE) 50 mcg/actuation nasal spray SPRAY 1 SPRAY (50 MCG TOTAL) INTO INTO EACH NOSTRIL EVERY DAY    gabapentin (NEURONTIN) 300 MG capsule Take 1 capsule (300 mg total) by mouth 3 (three) times daily.    hydroCHLOROthiazide (HYDRODIURIL) 25 MG tablet Take 1 tablet (25 mg total) by mouth once daily.    methylPREDNISolone (MEDROL DOSEPACK) 4 mg tablet use as directed    nabumetone (RELAFEN) 500 MG tablet Take 1 tablet (500 mg total) by mouth 2 (two) times daily as needed for Pain.    NIFEdipine (PROCARDIA-XL) 60 MG (OSM) 24 hr tablet Take 1 tablet (60 mg total) by mouth 2 (two) times a day.    tiZANidine (ZANAFLEX) 4 MG tablet Take 0.5-1 tablets (2-4 mg total) by mouth 2 (two) times daily as needed (muscle spasms). May cause drowsiness.     No current facility-administered medications for this visit.       Review of  Systems     GENERAL:  No weight loss, malaise or fevers.  HEENT:   No recent changes in vision or hearing  NECK:  Negative for lumps, no difficulty with swallowing.  RESPIRATORY:  Negative for cough, wheezing or shortness of breath, patient denies any recent URI.  CARDIOVASCULAR:  Negative for chest pain or palpitations.  GI:  Negative for abdominal discomfort, blood in stools or black stools or change in bowel habits.  MUSCULOSKELETAL:  See HPI.  SKIN:  Negative for lesions, rash, and itching.  PSYCH:  No mood disorder or recent psychosocial stressors.   HEMATOLOGY/LYMPHOLOGY:  Negative for prolonged bleeding, bruising easily or swollen nodes.    NEURO:   No history of syncope, paralysis, seizures or tremors.  All other reviewed and negative other than HPI.    OBJECTIVE:    There were no vitals taken for this visit.    Telemedicine Exam  There were no vitals filed for this visit.  There is no height or weight on file to calculate BMI.   (reviewed on 5/8/2023)     GENERAL: Well appearing, in no acute distress, alert and oriented x3.  Cooperative.  PSYCH:  Mood and affect appropriate.  SKIN: Skin color & texture with no obvious abnormalities.    HEAD/FACE:  Normocephalic, atraumatic.    PULM:  No difficulty breathing. No nasal flaring. No obvious wheezing.  EXTREMITIES: No obvious deformities. Moving all extremities well, appears to have symmetric strength throughout.  MUSCULOSKELETAL: No obvious atrophy abnormalities are noted.   SIJ testing, performed by patient with practitioner guidance:  - TTP over SI joint: Present  - Tiera's/ Ubaldo's: Positive    NEURO: No obvious neurologic deficit.   GAIT: sitting.     Physical Exam: last in clinic visit:    Physical Exam    GENERAL: Well appearing, in no acute distress, alert and oriented x3.  PSYCH:  Mood and affect appropriate.  SKIN: Skin color, texture, turgor normal, no rashes or lesions.  HEAD/FACE:  Normocephalic, atraumatic. Cranial nerves grossly intact.    CV:  RRR with palpation of the radial artery.  PULM: No evidence of respiratory difficulty, symmetric chest rise.  GI:  Soft and non-tender.    BACK: Straight leg raising in the sitting and supine positions is negative to radicular pain. pain to palpation over the facet joints of the lumbar spine or spinous processes. Normal range of motion without pain reproduction.  EXTREMITIES:  Peripheral joint range of motion is full but with pain in obvious instability in the right lower extremity.  No deformities, edema, or skin discoloration. Good capillary refill.  MUSCULOSKELETAL: Unable to stand on heels & toes.   hip, and knee provocative maneuvers are negative.    SIJ testing: bilateral  - TTP over SI joint: Present  - Tiera's/ Ubaldo's: Positive    - Sacroiliac Distraction Test (anterior pressure): Positive  - Sacroiliac Compression Test (lateral pressure): Positive   - SacralThrust Test (posterior pressure): Positive    Facet loading test is positive bilaterally.   Bilateral upper and lower extremity strength is normal and symmetric.  No atrophy or tone abnormalities are noted.    RIGHT Lower extremity: Hip flexion 4/5, Hip Abduction 4/5, Hip Adduction 4/5, Knee extension 4/5, Knee flexion 4/5, Ankle dorsiflexion 5/5, Extensor hallucis longus 5/5, Ankle plantarflexion 5/5  LEFT Lower extremity:  Hip flexion 5/5, Hip Abduction 5/5,Hip Adduction 5/5, Knee extension 5/5, Knee flexion 5/5, Ankle dorsiflexion 5/5, Extensor hallucis longus 5/5, Ankle plantarflexion 5/5  -Normal testing knee (patellar) jerk and ankle (achilles) jerk    NEURO: Bilateral upper and lower extremity coordination and muscle stretch reflexes are physiologic and symmetric. No loss of sensation is noted.  GAIT: normal.    Imagin16    X-Ray Lumbar Spine AP And Lateral    Narrative  Three views of the lumbar spine    Findings: The vertebral bodies demonstrate normal height.  There is very minimal dextroscoliosis of the thoracolumbar junction.  Minimal disc space narrowing is suspected at the L5-S1 level. There appears to be some mild facet arthropathy within the lower lumbar spine.      ASSESSMENT: 42 y.o. year old female with lower back and leg pain, consistent with     1. Sacroiliitis  nabumetone (RELAFEN) 500 MG tablet    IR SI Joint Injection w/Imaging    IR SI Joint Injection w/Imaging    Case Request-RAD/Other Procedure Area: Bilateral SIJ Injection RN IV Sedation    tiZANidine (ZANAFLEX) 4 MG tablet              PLAN:   - Interventions:  Schedule for bilateral SIJ injection for sacroiliitis  -s/p bilateral L4/5 TF MARÍA with 0% relief    - Anticoagulation use: no no anticoagulation     report:  Reviewed and consistent with medication use as prescribed.    - Medications:  -  We have discussed continuing Gabapentin 300 mg TID.  We have reviewed potential side effects of this medication including daytime somnolence, weight gain and peripheral edema  -Begin Relafen 500 mg BID for inflammatory pain    - Therapy:   We discussed continuing physician directed exercises at home as part of HEP    - Imaging: Reviewed available imaging with patient and answered any questions they had regarding study.      - Follow up visit: return to clinic in 4 weeks after injection      The above plan and management options were discussed at length with patient. Patient is in agreement with the above and verbalized understanding.    - I discussed the goals of interventional chronic pain management with the patient on today's visit. We discussed a multimodal and systematic approach to pain.  This includes diagnostic and therapeutic injections, adjuvant pharmacologic treatment, physical therapy, and at times psychiatry.  I emphasized the importance of regular exercise, core strengthening and stretching, diet and weight loss as a cornerstone of long-term pain management.    - This condition does not require this patient to take time off of work, and the primary goal of our  Pain Management services is to improve the patient's functional capacity.  - Patient Questions: Answered all of the patient's questions regarding diagnoses, therapy, treatment and next steps        Anum Brambila PA-C  Interventional Pain Management  [facility]    Disclaimer:  This note was prepared using voice recognition system and is likely to have sound alike errors that may have been overlooked even after proof reading.  Please call me with any questions

## 2023-05-04 NOTE — H&P (VIEW-ONLY)
Est Patient Chronic Pain Note (Follow up Visit)    Chief Complaint:   No chief complaint on file.    Established Patient - TeleHealth Visit    The patient location is: [place]  The chief complaint leading to consultation is: chronic pain     Visit type: audiovisual    Face to Face time with patient: 10-15 minutes  20 minutes of total time spent on the encounter, which includes face to face time and non-face to face time preparing to see the patient (eg, review of tests), Obtaining and/or reviewing separately obtained history, Documenting clinical information in the electronic or other health record, Independently interpreting results (not separately reported) and communicating results to the patient/family/caregiver, or Care coordination (not separately reported).     Each patient to whom he or she provides medical services by telemedicine is:  (1) informed of the relationship between the physician and patient and the respective role of any other health care provider with respect to management of the patient; and (2) notified that he or she may decline to receive medical services by telemedicine and may withdraw from such care at any time.      SUBJECTIVE:  Interval History (5/8/2023): Sarah Brown presents today via telemed for follow-up visit.  she underwent  bilateral L4/5 TF MARÍA  on 4/12/23.  The patient reports that she is/was unchanged following the procedure.  she reports 0% pain relief.  She reports continued lower lumbosacral pain with radiation into her bilateral buttocks, lateral hips, and bilateral thighs.  Patient reports pain as 8/10 today. She completed physical therapy last week without any improvement in her pain. Her pain keeps her up at night and interferes with sleep and daily activities.    Interval History (3/27/2023):  Sarah Brown presents today for follow-up visit to review MRI.  Patient was last seen on 3/7/2023. At that visit, the plan was to order MRI of lumbar spine to further evaluate  radicular symptoms. Patient reports pain as 5/10 today. MRI consistent with mild multilevel degenerative changes with mild edema within the right L5 pedicle which may be a source of pain. Pain is described as pulling, numbness in nature.  Patient reports pain in a bandlike distribution in the lower back which radiates down the lateral and posterior aspects of bilateral lower extremities, right  worse than on the left.  Pain is exacerbated with prolonged sitting, moving from sitting to standing and with ambulating even a few steps. She reports intermittent swelling along her right lower extremity, not improved by elevating lower extremities. Patient previously started on Gabapentin by Dr. Gray, taking 300 mg TID, which she reports offers some relief from her symptoms. Pain interferes with daily activities.    MRI Lumbar spine 03/09/2023  FINDINGS:  There are 5 non-rib-bearing lumbar vertebrae.  Alignment is unremarkable with no significant listhesis.  No acute fracture or compression deformity.  No aggressive focal signal abnormality.  Mild degenerative endplate osteophytosis noted.  Mild edema within the right L5 pedicle.     Conus medullaris terminates at the L2 level.  Conus medullaris is normal in size and signal.     T12-L1: No significant disc pathology.  No significant spinal canal or neural foraminal stenosis.     L1-L2: No significant disc pathology.  No significant spinal canal or neural foraminal stenosis.     L2-L3: Trace annular disc bulge.  Mild bilateral facet arthropathy.  No significant spinal canal or neural foraminal stenosis.     L3-L4: Trace annular disc bulge.  Mild bilateral facet arthropathy.  No significant spinal canal or neural foraminal stenosis.     L4-L5: Disc desiccation and small circumferential disc bulge.  Bilateral facet arthropathy with ligamentum flavum thickening.  No significant spinal canal stenosis.  Mild bilateral neural foraminal stenosis.     L5-S1: Trace annular disc  bulge.  Mild bilateral facet arthropathy.  No significant spinal canal or neural foraminal stenosis.     Paraspinal soft tissues are unremarkable.  Visualized intra-abdominal and pelvic contents are also unremarkable.     Impression:     Mild multilevel degenerative changes as detailed above including mild edema within the right L5 pedicle which may be a source of pain.     No significant spinal canal stenosis.  Mild bilateral neural foraminal stenosis at L4-L5.    Initial HPI 03/07/2023  Sarah Brown is a 42 y.o. female with past medical history significant for migraines, hypertension, obesity who presents to the clinic for the evaluation of lower back and leg pain.  Patient reports pain has been present for the last 2-3 years without inciting accident injury or trauma.  Pain has been particularly severe over the last few months.  Pain is intermittent and today is rated a 10/10.  Pain is described as pulling, numbness in nature.  Patient reports pain in a bandlike distribution in the lower back which radiates down the lateral and posterior aspects of bilateral lower extremities in L4-S2 distribution to the calf.  Pain is more severe on the right than on the left.  Pain is exacerbated with prolonged sitting, moving from sitting to standing and with ambulating even a few steps.  Patient does endorse associated weakness in the right lower extremity associated with her pain.  Pain is marginally improved with sitting on a pillow.  Patient has completed 10 sessions of physical therapy from 01/27/2023 through 03/06/2023 without any meaningful improvement in her lower back or leg pain.  Patient was started on gabapentin 300 mg 3 times daily by Dr. Gray starting today as well as Toradol 10 mg up to twice daily.  Patient has not trialed membrane stabilizing agents prior to this.  Patient has not received prior intervention.    Patient reports significant motor weakness and loss of sensations.  Patient denies night  fever/night sweats, urinary incontinence, bowel incontinence, and significant weight loss.      Pain Disability Index Review:     Last 3 PDI Scores 3/7/2023   Pain Disability Index (PDI) 60       Non-Pharmacologic Treatments:  Physical Therapy/Home Exercise: yes  Ice/Heat:no  TENS: no  Acupuncture: no  Massage: no  Chiropractic: no    Other: no      Pain Medications:  - Adjuvant Medications: Neurontin (Gabapentin) and Toradol (Ketorolac)    Pain Procedures:   None    Past Medical History:   Diagnosis Date    Hypertension     diet controled    Migraines     with aura, per pt     Past Surgical History:   Procedure Laterality Date    SELECTIVE INJECTION OF ANESTHETIC AGENT AROUND LUMBAR SPINAL NERVE ROOT BY TRANSFORAMINAL APPROACH Bilateral 4/12/2023    Procedure: Bilateral L4/5 TF MARÍA RN IV Sedation;  Surgeon: Doris Mello MD;  [facility];  Service: Pain Management;  Laterality: Bilateral;    TUBAL LIGATION  9/26/14     Review of patient's allergies indicates:   Allergen Reactions    Gluten protein Itching       Current Outpatient Medications   Medication Sig    fluticasone propionate (FLONASE) 50 mcg/actuation nasal spray SPRAY 1 SPRAY (50 MCG TOTAL) INTO INTO EACH NOSTRIL EVERY DAY    gabapentin (NEURONTIN) 300 MG capsule Take 1 capsule (300 mg total) by mouth 3 (three) times daily.    hydroCHLOROthiazide (HYDRODIURIL) 25 MG tablet Take 1 tablet (25 mg total) by mouth once daily.    methylPREDNISolone (MEDROL DOSEPACK) 4 mg tablet use as directed    nabumetone (RELAFEN) 500 MG tablet Take 1 tablet (500 mg total) by mouth 2 (two) times daily as needed for Pain.    NIFEdipine (PROCARDIA-XL) 60 MG (OSM) 24 hr tablet Take 1 tablet (60 mg total) by mouth 2 (two) times a day.    tiZANidine (ZANAFLEX) 4 MG tablet Take 0.5-1 tablets (2-4 mg total) by mouth 2 (two) times daily as needed (muscle spasms). May cause drowsiness.     No current facility-administered medications for this visit.       Review of  Systems     GENERAL:  No weight loss, malaise or fevers.  HEENT:   No recent changes in vision or hearing  NECK:  Negative for lumps, no difficulty with swallowing.  RESPIRATORY:  Negative for cough, wheezing or shortness of breath, patient denies any recent URI.  CARDIOVASCULAR:  Negative for chest pain or palpitations.  GI:  Negative for abdominal discomfort, blood in stools or black stools or change in bowel habits.  MUSCULOSKELETAL:  See HPI.  SKIN:  Negative for lesions, rash, and itching.  PSYCH:  No mood disorder or recent psychosocial stressors.   HEMATOLOGY/LYMPHOLOGY:  Negative for prolonged bleeding, bruising easily or swollen nodes.    NEURO:   No history of syncope, paralysis, seizures or tremors.  All other reviewed and negative other than HPI.    OBJECTIVE:    There were no vitals taken for this visit.    Telemedicine Exam  There were no vitals filed for this visit.  There is no height or weight on file to calculate BMI.   (reviewed on 5/8/2023)     GENERAL: Well appearing, in no acute distress, alert and oriented x3.  Cooperative.  PSYCH:  Mood and affect appropriate.  SKIN: Skin color & texture with no obvious abnormalities.    HEAD/FACE:  Normocephalic, atraumatic.    PULM:  No difficulty breathing. No nasal flaring. No obvious wheezing.  EXTREMITIES: No obvious deformities. Moving all extremities well, appears to have symmetric strength throughout.  MUSCULOSKELETAL: No obvious atrophy abnormalities are noted.   SIJ testing, performed by patient with practitioner guidance:  - TTP over SI joint: Present  - Tiera's/ Ubaldo's: Positive    NEURO: No obvious neurologic deficit.   GAIT: sitting.     Physical Exam: last in clinic visit:    Physical Exam    GENERAL: Well appearing, in no acute distress, alert and oriented x3.  PSYCH:  Mood and affect appropriate.  SKIN: Skin color, texture, turgor normal, no rashes or lesions.  HEAD/FACE:  Normocephalic, atraumatic. Cranial nerves grossly intact.    CV:  RRR with palpation of the radial artery.  PULM: No evidence of respiratory difficulty, symmetric chest rise.  GI:  Soft and non-tender.    BACK: Straight leg raising in the sitting and supine positions is negative to radicular pain. pain to palpation over the facet joints of the lumbar spine or spinous processes. Normal range of motion without pain reproduction.  EXTREMITIES:  Peripheral joint range of motion is full but with pain in obvious instability in the right lower extremity.  No deformities, edema, or skin discoloration. Good capillary refill.  MUSCULOSKELETAL: Unable to stand on heels & toes.   hip, and knee provocative maneuvers are negative.    SIJ testing: bilateral  - TTP over SI joint: Present  - Tiera's/ Ubaldo's: Positive    - Sacroiliac Distraction Test (anterior pressure): Positive  - Sacroiliac Compression Test (lateral pressure): Positive   - SacralThrust Test (posterior pressure): Positive    Facet loading test is positive bilaterally.   Bilateral upper and lower extremity strength is normal and symmetric.  No atrophy or tone abnormalities are noted.    RIGHT Lower extremity: Hip flexion 4/5, Hip Abduction 4/5, Hip Adduction 4/5, Knee extension 4/5, Knee flexion 4/5, Ankle dorsiflexion 5/5, Extensor hallucis longus 5/5, Ankle plantarflexion 5/5  LEFT Lower extremity:  Hip flexion 5/5, Hip Abduction 5/5,Hip Adduction 5/5, Knee extension 5/5, Knee flexion 5/5, Ankle dorsiflexion 5/5, Extensor hallucis longus 5/5, Ankle plantarflexion 5/5  -Normal testing knee (patellar) jerk and ankle (achilles) jerk    NEURO: Bilateral upper and lower extremity coordination and muscle stretch reflexes are physiologic and symmetric. No loss of sensation is noted.  GAIT: normal.    Imagin16    X-Ray Lumbar Spine AP And Lateral    Narrative  Three views of the lumbar spine    Findings: The vertebral bodies demonstrate normal height.  There is very minimal dextroscoliosis of the thoracolumbar junction.  Minimal disc space narrowing is suspected at the L5-S1 level. There appears to be some mild facet arthropathy within the lower lumbar spine.      ASSESSMENT: 42 y.o. year old female with lower back and leg pain, consistent with     1. Sacroiliitis  nabumetone (RELAFEN) 500 MG tablet    IR SI Joint Injection w/Imaging    IR SI Joint Injection w/Imaging    Case Request-RAD/Other Procedure Area: Bilateral SIJ Injection RN IV Sedation    tiZANidine (ZANAFLEX) 4 MG tablet              PLAN:   - Interventions:  Schedule for bilateral SIJ injection for sacroiliitis  -s/p bilateral L4/5 TF MARÍA with 0% relief    - Anticoagulation use: no no anticoagulation     report:  Reviewed and consistent with medication use as prescribed.    - Medications:  -  We have discussed continuing Gabapentin 300 mg TID.  We have reviewed potential side effects of this medication including daytime somnolence, weight gain and peripheral edema  -Begin Relafen 500 mg BID for inflammatory pain    - Therapy:   We discussed continuing physician directed exercises at home as part of HEP    - Imaging: Reviewed available imaging with patient and answered any questions they had regarding study.      - Follow up visit: return to clinic in 4 weeks after injection      The above plan and management options were discussed at length with patient. Patient is in agreement with the above and verbalized understanding.    - I discussed the goals of interventional chronic pain management with the patient on today's visit. We discussed a multimodal and systematic approach to pain.  This includes diagnostic and therapeutic injections, adjuvant pharmacologic treatment, physical therapy, and at times psychiatry.  I emphasized the importance of regular exercise, core strengthening and stretching, diet and weight loss as a cornerstone of long-term pain management.    - This condition does not require this patient to take time off of work, and the primary goal of our  Pain Management services is to improve the patient's functional capacity.  - Patient Questions: Answered all of the patient's questions regarding diagnoses, therapy, treatment and next steps        Anum Brambila PA-C  Interventional Pain Management  [facility]    Disclaimer:  This note was prepared using voice recognition system and is likely to have sound alike errors that may have been overlooked even after proof reading.  Please call me with any questions

## 2023-05-08 ENCOUNTER — OFFICE VISIT (OUTPATIENT)
Dept: PAIN MEDICINE | Facility: CLINIC | Age: 43
End: 2023-05-08
Payer: COMMERCIAL

## 2023-05-08 ENCOUNTER — PATIENT MESSAGE (OUTPATIENT)
Dept: PAIN MEDICINE | Facility: CLINIC | Age: 43
End: 2023-05-08

## 2023-05-08 DIAGNOSIS — M46.1 SACROILIITIS: Primary | ICD-10-CM

## 2023-05-08 PROCEDURE — 1159F PR MEDICATION LIST DOCUMENTED IN MEDICAL RECORD: ICD-10-PCS | Mod: CPTII,95,, | Performed by: PHYSICIAN ASSISTANT

## 2023-05-08 PROCEDURE — 3044F HG A1C LEVEL LT 7.0%: CPT | Mod: CPTII,95,, | Performed by: PHYSICIAN ASSISTANT

## 2023-05-08 PROCEDURE — 1160F PR REVIEW ALL MEDS BY PRESCRIBER/CLIN PHARMACIST DOCUMENTED: ICD-10-PCS | Mod: CPTII,95,, | Performed by: PHYSICIAN ASSISTANT

## 2023-05-08 PROCEDURE — 1159F MED LIST DOCD IN RCRD: CPT | Mod: CPTII,95,, | Performed by: PHYSICIAN ASSISTANT

## 2023-05-08 PROCEDURE — 3044F PR MOST RECENT HEMOGLOBIN A1C LEVEL <7.0%: ICD-10-PCS | Mod: CPTII,95,, | Performed by: PHYSICIAN ASSISTANT

## 2023-05-08 PROCEDURE — 1160F RVW MEDS BY RX/DR IN RCRD: CPT | Mod: CPTII,95,, | Performed by: PHYSICIAN ASSISTANT

## 2023-05-08 PROCEDURE — 99214 PR OFFICE/OUTPT VISIT, EST, LEVL IV, 30-39 MIN: ICD-10-PCS | Mod: 95,,, | Performed by: PHYSICIAN ASSISTANT

## 2023-05-08 PROCEDURE — 99214 OFFICE O/P EST MOD 30 MIN: CPT | Mod: 95,,, | Performed by: PHYSICIAN ASSISTANT

## 2023-05-08 RX ORDER — TIZANIDINE 4 MG/1
2-4 TABLET ORAL 2 TIMES DAILY PRN
Qty: 60 TABLET | Refills: 0 | Status: SHIPPED | OUTPATIENT
Start: 2023-05-08 | End: 2023-06-02 | Stop reason: SDUPTHER

## 2023-05-08 RX ORDER — NABUMETONE 500 MG/1
500 TABLET, FILM COATED ORAL 2 TIMES DAILY PRN
Qty: 60 TABLET | Refills: 1 | Status: SHIPPED | OUTPATIENT
Start: 2023-05-08

## 2023-05-17 NOTE — PRE-PROCEDURE INSTRUCTIONS
Spoke with patients regarding procedure scheduled on 5.23     Arrival time 0645     Has patient been sick with fever or on antibiotics within the last 7 days? No     Does the patient have any open wounds, sores or rashes? No     Does the patient have any recent fractures? no     Has patient received a vaccination within the last 7 days? No     Received the COVID vaccination? yes     Has the patient stopped all medications as directed? na     Does patient have a pacemaker and or defibrillator? no     Does the patient have a ride to and from procedure and someone reliable to remain with patient? son     Is the patient diabetic? no     Does the patient have sleep apnea? Or use O2 at home? no     Is the patient receiving sedation? Yes     Is the patient instructed to remain NPO beginning at midnight the night before their procedure? yes     Procedure location confirmed with patient? Yes     Covid- Denies signs/symptoms. Instructed to notify PAT/MD if any changes.

## 2023-05-23 ENCOUNTER — HOSPITAL ENCOUNTER (OUTPATIENT)
Facility: HOSPITAL | Age: 43
Discharge: HOME | End: 2023-05-23
Attending: ANESTHESIOLOGY | Admitting: ANESTHESIOLOGY
Payer: COMMERCIAL

## 2023-05-23 VITALS
BODY MASS INDEX: 27.68 KG/M2 | TEMPERATURE: 98 F | HEART RATE: 66 BPM | WEIGHT: 156.19 LBS | DIASTOLIC BLOOD PRESSURE: 86 MMHG | OXYGEN SATURATION: 99 % | RESPIRATION RATE: 16 BRPM | HEIGHT: 63 IN | SYSTOLIC BLOOD PRESSURE: 127 MMHG

## 2023-05-23 DIAGNOSIS — M46.1 SACROILIITIS: ICD-10-CM

## 2023-05-23 LAB
B-HCG UR QL: NEGATIVE
CTP QC/QA: YES

## 2023-05-23 PROCEDURE — 25000003 PHARM REV CODE 250: Performed by: ANESTHESIOLOGY

## 2023-05-23 PROCEDURE — 27096 PR INJECTION,SACROILIAC JOINT: ICD-10-PCS | Mod: 50,,, | Performed by: ANESTHESIOLOGY

## 2023-05-23 PROCEDURE — 63600175 PHARM REV CODE 636 W HCPCS: Performed by: ANESTHESIOLOGY

## 2023-05-23 PROCEDURE — 27096 INJECT SACROILIAC JOINT: CPT | Mod: 50,,, | Performed by: ANESTHESIOLOGY

## 2023-05-23 PROCEDURE — 25500020 PHARM REV CODE 255: Performed by: ANESTHESIOLOGY

## 2023-05-23 PROCEDURE — 27096 INJECT SACROILIAC JOINT: CPT | Mod: LT | Performed by: ANESTHESIOLOGY

## 2023-05-23 PROCEDURE — 81025 URINE PREGNANCY TEST: CPT | Performed by: ANESTHESIOLOGY

## 2023-05-23 RX ORDER — MIDAZOLAM HYDROCHLORIDE 1 MG/ML
INJECTION, SOLUTION INTRAMUSCULAR; INTRAVENOUS
Status: DISCONTINUED | OUTPATIENT
Start: 2023-05-23 | End: 2023-05-23 | Stop reason: HOSPADM

## 2023-05-23 RX ORDER — BUPIVACAINE HYDROCHLORIDE 2.5 MG/ML
INJECTION, SOLUTION EPIDURAL; INFILTRATION; INTRACAUDAL
Status: DISCONTINUED | OUTPATIENT
Start: 2023-05-23 | End: 2023-05-23 | Stop reason: HOSPADM

## 2023-05-23 RX ORDER — INDOMETHACIN 25 MG/1
CAPSULE ORAL
Status: DISCONTINUED | OUTPATIENT
Start: 2023-05-23 | End: 2023-05-23 | Stop reason: HOSPADM

## 2023-05-23 RX ORDER — FENTANYL CITRATE 50 UG/ML
INJECTION, SOLUTION INTRAMUSCULAR; INTRAVENOUS
Status: DISCONTINUED | OUTPATIENT
Start: 2023-05-23 | End: 2023-05-23 | Stop reason: HOSPADM

## 2023-05-23 RX ORDER — TRIAMCINOLONE ACETONIDE 40 MG/ML
INJECTION, SUSPENSION INTRA-ARTICULAR; INTRAMUSCULAR
Status: DISCONTINUED | OUTPATIENT
Start: 2023-05-23 | End: 2023-05-23 | Stop reason: HOSPADM

## 2023-05-23 NOTE — DISCHARGE SUMMARY
Discharge Note  Short Stay      SUMMARY     Admit Date: 5/23/2023    Attending Physician: Doris Mello MD        Discharge Physician: Doris Mello MD        Discharge Date: 5/23/2023 7:38 AM    Procedure(s) (LRB):  Bilateral SIJ Injection RN IV Sedation (Bilateral)    Final Diagnosis: Sacroiliitis [M46.1]    Disposition: Home or self care    Patient Instructions:   Current Discharge Medication List        CONTINUE these medications which have NOT CHANGED    Details   gabapentin (NEURONTIN) 300 MG capsule Take 1 capsule (300 mg total) by mouth 3 (three) times daily.  Qty: 90 capsule, Refills: 1    Associated Diagnoses: Lumbar radiculopathy      hydroCHLOROthiazide (HYDRODIURIL) 25 MG tablet Take 1 tablet (25 mg total) by mouth once daily.  Qty: 90 tablet, Refills: 0    Comments: .  Associated Diagnoses: Essential hypertension      NIFEdipine (PROCARDIA-XL) 60 MG (OSM) 24 hr tablet Take 1 tablet (60 mg total) by mouth 2 (two) times a day.  Qty: 180 tablet, Refills: 0    Comments: .  Associated Diagnoses: Essential hypertension      fluticasone propionate (FLONASE) 50 mcg/actuation nasal spray SPRAY 1 SPRAY (50 MCG TOTAL) INTO INTO EACH NOSTRIL EVERY DAY  Qty: 32 mL, Refills: 3    Associated Diagnoses: Environmental and seasonal allergies      methylPREDNISolone (MEDROL DOSEPACK) 4 mg tablet use as directed  Qty: 1 each, Refills: 0      nabumetone (RELAFEN) 500 MG tablet Take 1 tablet (500 mg total) by mouth 2 (two) times daily as needed for Pain.  Qty: 60 tablet, Refills: 1    Associated Diagnoses: Sacroiliitis      tiZANidine (ZANAFLEX) 4 MG tablet Take 0.5-1 tablets (2-4 mg total) by mouth 2 (two) times daily as needed (muscle spasms). May cause drowsiness.  Qty: 60 tablet, Refills: 0    Associated Diagnoses: Sacroiliitis                 Discharge Diagnosis: Sacroiliitis [M46.1]  Condition on Discharge: Stable with no complications to procedure   Diet on Discharge: Same as before.  Activity: as per instruction  sheet.  Discharge to: Home with a responsible adult.  Follow up: 2-4 weeks       Please call the office at [phone] if you experience any weakness or loss of sensation, fever > 101.5, pain uncontrolled with oral medications, persistent nausea/vomiting/or diarrhea, redness or drainage from the incisions, or any other worrisome concerns. If physician on call was not reached or could not communicate with our office for any reason please go to the nearest emergency department

## 2023-05-23 NOTE — OP NOTE
Sarah Brown  42 y.o. female      Vitals:    05/23/23 0736   BP: 115/79   Pulse: (!) 57   Resp: 16   Temp:        Procedure Date: 05/23/2023        INFORMED CONSENT: The procedure, risks, benefits and options were discussed with patient. There are no contraindications to the procedure. The patient expressed understanding and agreed to proceed. The personnel performing the procedure was discussed. I verify that I personally obtained consent prior to the start of the procedure and the signed consent can be found on the patient's chart.       Anesthesia:   Conscious sedation provided by M.D    The patient was monitored with continuous pulse oximetry, EKG, and intermittent blood pressure monitors.  The patient was hemodynamically stable throughout the entire process was responsive to voice, and breathing spontaneously.  Supplemental O2 was provided at 2L/min via nasal cannula.  Patient was comfortable for the duration of the procedure. (See nurse documentation and case log for sedation time)    There was a total of 2mg IV Midazolam and 50mcg Fentanyl titrated for the procedure    Pre Procedure diagnosis: Sacroiliitis [M46.1]  Post-Procedure diagnosis: SAME      PROCEDURE:   Bilateral sacroiliac joint injection                            REASON FOR PROCEDURE:   Sacroiliitis [M46.1]    MEDICATIONS INJECTED: 1mL 40mg/ml Kenalog and 4mL Bupivacaine 0.25% into each site    LOCAL ANESTHETIC USED: Xylocaine 1% 6ml     ESTIMATED BLOOD LOSS: None.   COMPLICATIONS: None.     TECHNIQUE:   Sacroiliac joint injection:   Laying in the prone position, the patient was prepped and draped in the usual sterile fashion using ChloraPrep and fenestrated drape.  The area was determined under fluoroscopy.  Local Xylocaine was injected by raising a wheel and going down to the periosteum using a 27-gauge hypodermic needle.  The 3.5 inch 22-gauge spinal needle was introduce into the Bilateral sacroiliac joint.  Negative pressure applied to  confirm no intravascular placement.  Omnipaque was injected to confirm placement and to confirm that there was no vascular runoff.  The medication was then injected slowly.  The patient tolerated the procedure well.                       The patient was monitored for approximately 30 minutes after the procedure. Patient was given post procedure and discharge instructions to follow at home. We will see the patient back in two weeks or the patient may call to inform of status. The patient was discharged in a stable condition

## 2023-05-23 NOTE — DISCHARGE INSTRUCTIONS

## 2023-06-01 DIAGNOSIS — M46.1 SACROILIITIS: ICD-10-CM

## 2023-06-01 NOTE — TELEPHONE ENCOUNTER
Good Morning/Afternoon,     Pt is requesting for a refill of: Tizanidine 4 MG  Last filed: 5/8/23  Last encounter: 5/8/23  Up coming apt: 6/26/23  Pharmacy: [id number] [place], [place]  Is this something you can do?      Chloé Graf  Medical Assistant

## 2023-06-02 RX ORDER — TIZANIDINE 4 MG/1
TABLET ORAL
Qty: 60 TABLET | Refills: 0 | Status: SHIPPED | OUTPATIENT
Start: 2023-06-02 | End: 2023-06-26 | Stop reason: SDUPTHER

## 2023-06-16 DIAGNOSIS — M46.1 SACROILIITIS: ICD-10-CM

## 2023-06-16 RX ORDER — TIZANIDINE 4 MG/1
TABLET ORAL
Qty: 180 TABLET | Refills: 1 | OUTPATIENT
Start: 2023-06-16

## 2023-06-16 NOTE — TELEPHONE ENCOUNTER
Call patient to inform them that they can  their medication from the pharmacy. Ot was sent to [facility] on 6/02/23 P.t  understand. All question answered.     Enrico Dominguez   Medical

## 2023-06-22 ENCOUNTER — TELEPHONE (OUTPATIENT)
Dept: PAIN MEDICINE | Facility: CLINIC | Age: 43
End: 2023-06-22
Payer: COMMERCIAL

## 2023-06-26 ENCOUNTER — OFFICE VISIT (OUTPATIENT)
Dept: PAIN MEDICINE | Facility: CLINIC | Age: 43
End: 2023-06-26
Payer: COMMERCIAL

## 2023-06-26 DIAGNOSIS — M46.1 SACROILIITIS: ICD-10-CM

## 2023-06-26 PROCEDURE — 99214 PR OFFICE/OUTPT VISIT, EST, LEVL IV, 30-39 MIN: ICD-10-PCS | Mod: 95,,, | Performed by: PHYSICIAN ASSISTANT

## 2023-06-26 PROCEDURE — 1160F RVW MEDS BY RX/DR IN RCRD: CPT | Mod: CPTII,95,, | Performed by: PHYSICIAN ASSISTANT

## 2023-06-26 PROCEDURE — 1159F PR MEDICATION LIST DOCUMENTED IN MEDICAL RECORD: ICD-10-PCS | Mod: CPTII,95,, | Performed by: PHYSICIAN ASSISTANT

## 2023-06-26 PROCEDURE — 1159F MED LIST DOCD IN RCRD: CPT | Mod: CPTII,95,, | Performed by: PHYSICIAN ASSISTANT

## 2023-06-26 PROCEDURE — 99214 OFFICE O/P EST MOD 30 MIN: CPT | Mod: 95,,, | Performed by: PHYSICIAN ASSISTANT

## 2023-06-26 PROCEDURE — 3044F PR MOST RECENT HEMOGLOBIN A1C LEVEL <7.0%: ICD-10-PCS | Mod: CPTII,95,, | Performed by: PHYSICIAN ASSISTANT

## 2023-06-26 PROCEDURE — 3044F HG A1C LEVEL LT 7.0%: CPT | Mod: CPTII,95,, | Performed by: PHYSICIAN ASSISTANT

## 2023-06-26 PROCEDURE — 1160F PR REVIEW ALL MEDS BY PRESCRIBER/CLIN PHARMACIST DOCUMENTED: ICD-10-PCS | Mod: CPTII,95,, | Performed by: PHYSICIAN ASSISTANT

## 2023-06-26 RX ORDER — TIZANIDINE 4 MG/1
TABLET ORAL
Qty: 60 TABLET | Refills: 2 | Status: SHIPPED | OUTPATIENT
Start: 2023-06-26

## 2023-06-26 NOTE — PROGRESS NOTES
Est Patient Chronic Pain Note (Follow up Visit)    Chief Complaint:   No chief complaint on file.    Established Patient - TeleHealth Visit    The patient location is: [place]  The chief complaint leading to consultation is: chronic pain     Visit type: audiovisual    Face to Face time with patient: 10-15 minutes  20 minutes of total time spent on the encounter, which includes face to face time and non-face to face time preparing to see the patient (eg, review of tests), Obtaining and/or reviewing separately obtained history, Documenting clinical information in the electronic or other health record, Independently interpreting results (not separately reported) and communicating results to the patient/family/caregiver, or Care coordination (not separately reported).     Each patient to whom he or she provides medical services by telemedicine is:  (1) informed of the relationship between the physician and patient and the respective role of any other health care provider with respect to management of the patient; and (2) notified that he or she may decline to receive medical services by telemedicine and may withdraw from such care at any time.      SUBJECTIVE:  Interval History (6/26/2023): Sarah Brown presents today via telemed for follow-up visit.  she underwent bilateral SIJ injection on 5/23/23.  The patient reports that she is/was better following the procedure.  she reports 75% pain relief, but the changes only lasted 2 weeks before the pain returned. She reports return of lower lumbosacral pain with radiation into her bilateral buttocks, lateral hips, and bilateral thighs. She reports was able to enjoy her trip to [place] due to relief from injection. She does get relief from Tizanidine.  Patient reports pain as 5/10 today.      Interval History (5/8/2023): Sarah Brown presents today via telemed for follow-up visit.  she underwent  bilateral L4/5 TF MARÍA  on 5/23/23.  The patient reports that she is/was unchanged  following the procedure.  she reports 0% pain relief.  She reports continued lower lumbosacral pain with radiation into her bilateral buttocks, lateral hips, and bilateral thighs.  Patient reports pain as 8/10 today. She completed physical therapy last week without any improvement in her pain. Her pain keeps her up at night and interferes with sleep and daily activities.    Interval History (3/27/2023):  Sarah Brown presents today for follow-up visit to review MRI.  Patient was last seen on 3/7/2023. At that visit, the plan was to order MRI of lumbar spine to further evaluate radicular symptoms. Patient reports pain as 5/10 today. MRI consistent with mild multilevel degenerative changes with mild edema within the right L5 pedicle which may be a source of pain. Pain is described as pulling, numbness in nature.  Patient reports pain in a bandlike distribution in the lower back which radiates down the lateral and posterior aspects of bilateral lower extremities, right  worse than on the left.  Pain is exacerbated with prolonged sitting, moving from sitting to standing and with ambulating even a few steps. She reports intermittent swelling along her right lower extremity, not improved by elevating lower extremities. Patient previously started on Gabapentin by Dr. Gray, taking 300 mg TID, which she reports offers some relief from her symptoms. Pain interferes with daily activities.    MRI Lumbar spine 03/09/2023  FINDINGS:  There are 5 non-rib-bearing lumbar vertebrae.  Alignment is unremarkable with no significant listhesis.  No acute fracture or compression deformity.  No aggressive focal signal abnormality.  Mild degenerative endplate osteophytosis noted.  Mild edema within the right L5 pedicle.     Conus medullaris terminates at the L2 level.  Conus medullaris is normal in size and signal.     T12-L1: No significant disc pathology.  No significant spinal canal or neural foraminal stenosis.     L1-L2: No  significant disc pathology.  No significant spinal canal or neural foraminal stenosis.     L2-L3: Trace annular disc bulge.  Mild bilateral facet arthropathy.  No significant spinal canal or neural foraminal stenosis.     L3-L4: Trace annular disc bulge.  Mild bilateral facet arthropathy.  No significant spinal canal or neural foraminal stenosis.     L4-L5: Disc desiccation and small circumferential disc bulge.  Bilateral facet arthropathy with ligamentum flavum thickening.  No significant spinal canal stenosis.  Mild bilateral neural foraminal stenosis.     L5-S1: Trace annular disc bulge.  Mild bilateral facet arthropathy.  No significant spinal canal or neural foraminal stenosis.     Paraspinal soft tissues are unremarkable.  Visualized intra-abdominal and pelvic contents are also unremarkable.     Impression:     Mild multilevel degenerative changes as detailed above including mild edema within the right L5 pedicle which may be a source of pain.     No significant spinal canal stenosis.  Mild bilateral neural foraminal stenosis at L4-L5.    Initial HPI 03/07/2023  Sarah Brown is a 43 y.o. female with past medical history significant for migraines, hypertension, obesity who presents to the clinic for the evaluation of lower back and leg pain.  Patient reports pain has been present for the last 2-3 years without inciting accident injury or trauma.  Pain has been particularly severe over the last few months.  Pain is intermittent and today is rated a 10/10.  Pain is described as pulling, numbness in nature.  Patient reports pain in a bandlike distribution in the lower back which radiates down the lateral and posterior aspects of bilateral lower extremities in L4-S2 distribution to the calf.  Pain is more severe on the right than on the left.  Pain is exacerbated with prolonged sitting, moving from sitting to standing and with ambulating even a few steps.  Patient does endorse associated weakness in the right  lower extremity associated with her pain.  Pain is marginally improved with sitting on a pillow.  Patient has completed 10 sessions of physical therapy from 01/27/2023 through 03/06/2023 without any meaningful improvement in her lower back or leg pain.  Patient was started on gabapentin 300 mg 3 times daily by Dr. Gray starting today as well as Toradol 10 mg up to twice daily.  Patient has not trialed membrane stabilizing agents prior to this.  Patient has not received prior intervention.    Patient reports significant motor weakness and loss of sensations.  Patient denies night fever/night sweats, urinary incontinence, bowel incontinence, and significant weight loss.      Pain Disability Index Review:     Last 3 PDI Scores 3/7/2023   Pain Disability Index (PDI) 60       Non-Pharmacologic Treatments:  Physical Therapy/Home Exercise: yes  Ice/Heat:no  TENS: no  Acupuncture: no  Massage: no  Chiropractic: no    Other: no      Pain Medications:  - Adjuvant Medications: Neurontin (Gabapentin) and Toradol (Ketorolac)    Pain Procedures:   bilateral SIJ injection on 5/23/23 with 75% relief    Past Medical History:   Diagnosis Date    Hypertension     diet controled    Migraines     with aura, per pt     Past Surgical History:   Procedure Laterality Date    INJECTION OF ANESTHETIC AGENT INTO SACROILIAC JOINT Bilateral 5/23/2023    Procedure: Bilateral SIJ Injection RN IV Sedation;  Surgeon: Doris Mello MD;  Location: [facility];  Service: Pain Management;  Laterality: Bilateral;    SELECTIVE INJECTION OF ANESTHETIC AGENT AROUND LUMBAR SPINAL NERVE ROOT BY TRANSFORAMINAL APPROACH Bilateral 4/12/2023    Procedure: Bilateral L4/5 TF MARÍA RN IV Sedation;  Surgeon: Doris Mello MD;  Location: [facility];  Service: Pain Management;  Laterality: Bilateral;    TUBAL LIGATION  9/26/14     Review of patient's allergies indicates:   Allergen Reactions    Gluten protein Itching       Current Outpatient Medications    Medication Sig    fluticasone propionate (FLONASE) 50 mcg/actuation nasal spray SPRAY 1 SPRAY (50 MCG TOTAL) INTO INTO EACH NOSTRIL EVERY DAY    gabapentin (NEURONTIN) 300 MG capsule Take 1 capsule (300 mg total) by mouth 3 (three) times daily.    hydroCHLOROthiazide (HYDRODIURIL) 25 MG tablet Take 1 tablet (25 mg total) by mouth once daily.    methylPREDNISolone (MEDROL DOSEPACK) 4 mg tablet use as directed    nabumetone (RELAFEN) 500 MG tablet Take 1 tablet (500 mg total) by mouth 2 (two) times daily as needed for Pain.    NIFEdipine (PROCARDIA-XL) 60 MG (OSM) 24 hr tablet Take 1 tablet (60 mg total) by mouth 2 (two) times a day.    tiZANidine (ZANAFLEX) 4 MG tablet TAKE 1/2 TO 1 TABLETS TWICE A DAY AS NEEDED MUSCLE SPASMS MAY CAUSE DROWSINESS     No current facility-administered medications for this visit.       Review of Systems     GENERAL:  No weight loss, malaise or fevers.  HEENT:   No recent changes in vision or hearing  NECK:  Negative for lumps, no difficulty with swallowing.  RESPIRATORY:  Negative for cough, wheezing or shortness of breath, patient denies any recent URI.  CARDIOVASCULAR:  Negative for chest pain or palpitations.  GI:  Negative for abdominal discomfort, blood in stools or black stools or change in bowel habits.  MUSCULOSKELETAL:  See HPI.  SKIN:  Negative for lesions, rash, and itching.  PSYCH:  No mood disorder or recent psychosocial stressors.   HEMATOLOGY/LYMPHOLOGY:  Negative for prolonged bleeding, bruising easily or swollen nodes.    NEURO:   No history of syncope, paralysis, seizures or tremors.  All other reviewed and negative other than HPI.    OBJECTIVE:    There were no vitals taken for this visit.    Telemedicine Exam  There were no vitals filed for this visit.  There is no height or weight on file to calculate BMI.   (reviewed on 6/26/2023)     GENERAL: Well appearing, in no acute distress, alert and oriented x3.  Cooperative.  PSYCH:  Mood and affect appropriate.  SKIN:  Skin color & texture with no obvious abnormalities.    HEAD/FACE:  Normocephalic, atraumatic.    PULM:  No difficulty breathing. No nasal flaring. No obvious wheezing.  EXTREMITIES: No obvious deformities. Moving all extremities well, appears to have symmetric strength throughout.  MUSCULOSKELETAL: No obvious atrophy abnormalities are noted.   SIJ testing, performed by patient with practitioner guidance:  - TTP over SI joint: Present  - Tiera's/ Ubaldo's: Positive    NEURO: No obvious neurologic deficit.   GAIT: sitting.     Physical Exam: last in clinic visit:    Physical Exam    GENERAL: Well appearing, in no acute distress, alert and oriented x3.  PSYCH:  Mood and affect appropriate.  SKIN: Skin color, texture, turgor normal, no rashes or lesions.  HEAD/FACE:  Normocephalic, atraumatic. Cranial nerves grossly intact.    CV: RRR with palpation of the radial artery.  PULM: No evidence of respiratory difficulty, symmetric chest rise.  GI:  Soft and non-tender.    BACK: Straight leg raising in the sitting and supine positions is negative to radicular pain. pain to palpation over the facet joints of the lumbar spine or spinous processes. Normal range of motion without pain reproduction.  EXTREMITIES:  Peripheral joint range of motion is full but with pain in obvious instability in the right lower extremity.  No deformities, edema, or skin discoloration. Good capillary refill.  MUSCULOSKELETAL: Unable to stand on heels & toes.   hip, and knee provocative maneuvers are negative.    SIJ testing: bilateral  - TTP over SI joint: Present  - Tiera's/ Ubaldo's: Positive    - Sacroiliac Distraction Test (anterior pressure): Positive  - Sacroiliac Compression Test (lateral pressure): Positive   - SacralThrust Test (posterior pressure): Positive    Facet loading test is positive bilaterally.   Bilateral upper and lower extremity strength is normal and symmetric.  No atrophy or tone abnormalities are noted.    RIGHT Lower  extremity: Hip flexion 4/5, Hip Abduction 4/5, Hip Adduction 4/5, Knee extension 4/5, Knee flexion 4/5, Ankle dorsiflexion 5/5, Extensor hallucis longus 5/5, Ankle plantarflexion 5/5  LEFT Lower extremity:  Hip flexion 5/5, Hip Abduction 5/5,Hip Adduction 5/5, Knee extension 5/5, Knee flexion 5/5, Ankle dorsiflexion 5/5, Extensor hallucis longus 5/5, Ankle plantarflexion 5/5  -Normal testing knee (patellar) jerk and ankle (achilles) jerk    NEURO: Bilateral upper and lower extremity coordination and muscle stretch reflexes are physiologic and symmetric. No loss of sensation is noted.  GAIT: normal.    Imagin16    X-Ray Lumbar Spine AP And Lateral    Narrative  Three views of the lumbar spine    Findings: The vertebral bodies demonstrate normal height.  There is very minimal dextroscoliosis of the thoracolumbar junction. Minimal disc space narrowing is suspected at the L5-S1 level. There appears to be some mild facet arthropathy within the lower lumbar spine.      ASSESSMENT: 43 y.o. year old female with lower back and leg pain, consistent with     1. Sacroiliitis  tiZANidine (ZANAFLEX) 4 MG tablet                PLAN:   - Interventions: None at this time, consider repeat SIJ injection. We also discussed considering SIJ ablation in the future. We discussed that this would require a diagnostic block prior to the RFA. Patient defers at this time.   -s/p bilateral SIJ injection on 23 with 75% relief  -s/p bilateral L4/5 TF MARÍA with 0% relief    - Anticoagulation use: no no anticoagulation     report:  Reviewed and consistent with medication use as prescribed.    - Medications:  -  We have discussed continuing Gabapentin 300 mg TID.  We have reviewed potential side effects of this medication including daytime somnolence, weight gain and peripheral edema  -Continue Relafen 500 mg prn for inflammatory pain  -Continue Zanaflex 4mg BID PRN muscle spasms (or can take 1/2 tablet). Patient understands this  may cause drowsiness.      - Therapy:   We discussed continuing physician directed exercises at home as part of HEP    - Imaging: Reviewed available imaging with patient and answered any questions they had regarding study.      - Follow up visit: return to clinic PRN      The above plan and management options were discussed at length with patient. Patient is in agreement with the above and verbalized understanding.    - I discussed the goals of interventional chronic pain management with the patient on today's visit. We discussed a multimodal and systematic approach to pain.  This includes diagnostic and therapeutic injections, adjuvant pharmacologic treatment, physical therapy, and at times psychiatry.  I emphasized the importance of regular exercise, core strengthening and stretching, diet and weight loss as a cornerstone of long-term pain management.    - This condition does not require this patient to take time off of work, and the primary goal of our Pain Management services is to improve the patient's functional capacity.  - Patient Questions: Answered all of the patient's questions regarding diagnoses, therapy, treatment and next steps        Anum Brambila PA-C  Interventional Pain Management  [facility]    Disclaimer:  This note was prepared using voice recognition system and is likely to have sound alike errors that may have been overlooked even after proof reading.  Please call me with any questions

## 2024-09-06 ENCOUNTER — PATIENT OUTREACH (OUTPATIENT)
Dept: ADMINISTRATIVE | Facility: HOSPITAL | Age: 44
End: 2024-09-06
Payer: COMMERCIAL

## 2024-09-06 NOTE — PROGRESS NOTES
VBHM Score: 3     Colon Cancer Screening  Mammogram  Uncontrolled BP                       Health Maintenance Topic(s) Outreach Outcomes & Actions Taken:    Colorectal Cancer Screening - Outreach Outcomes & Actions Taken  : Declined    Breast Cancer Screening - Outreach Outcomes & Actions Taken  : Declined    Blood Pressure - Outreach Outcomes & Actions Taken  : Primary Care Follow Up Visit Scheduled      Additional Notes:  Spoke to pt she declined screenings at this time, she has a PCP appt already scheduled 10.23.24               Care Management, Digital Medicine, and/or Education Referrals    OPCM Risk Score: 17.9         Next Steps - Referral Actions: Not high risk, declined        Additional Notes:

## 2024-09-26 ENCOUNTER — PATIENT OUTREACH (OUTPATIENT)
Dept: ADMINISTRATIVE | Facility: HOSPITAL | Age: 44
End: 2024-09-26
Payer: COMMERCIAL

## 2025-03-06 ENCOUNTER — PATIENT OUTREACH (OUTPATIENT)
Dept: ADMINISTRATIVE | Facility: HOSPITAL | Age: 45
End: 2025-03-06
Payer: COMMERCIAL

## 2025-03-06 NOTE — PROGRESS NOTES
[facility] OUTREACH: per chart review pt is OVERDUE for Annual PCP visit, pt canceled appt scheduled 10.23.24, attempted to contact pt no ans, lvm, pt has not accessed portal since 9.11.24, will still send portal message.

## 2025-05-23 ENCOUNTER — HOSPITAL ENCOUNTER (OUTPATIENT)
Dept: RADIOLOGY | Facility: HOSPITAL | Age: 45
Discharge: HOME | End: 2025-05-23
Attending: PHYSICIAN ASSISTANT
Payer: COMMERCIAL

## 2025-05-23 ENCOUNTER — HOSPITAL ENCOUNTER (OUTPATIENT)
Dept: CARDIOLOGY | Facility: HOSPITAL | Age: 45
Discharge: HOME | End: 2025-05-23
Payer: COMMERCIAL

## 2025-05-23 ENCOUNTER — OFFICE VISIT (OUTPATIENT)
Dept: INTERNAL MEDICINE | Facility: CLINIC | Age: 45
End: 2025-05-23
Payer: COMMERCIAL

## 2025-05-23 VITALS — BODY MASS INDEX: 29.61 KG/M2 | HEIGHT: 63 IN | WEIGHT: 167.13 LBS

## 2025-05-23 VITALS
HEIGHT: 63 IN | TEMPERATURE: 99 F | SYSTOLIC BLOOD PRESSURE: 124 MMHG | DIASTOLIC BLOOD PRESSURE: 86 MMHG | BODY MASS INDEX: 29.61 KG/M2 | WEIGHT: 167.13 LBS

## 2025-05-23 DIAGNOSIS — R61 NIGHT SWEATS: ICD-10-CM

## 2025-05-23 DIAGNOSIS — Z01.818 PREOP EXAM FOR INTERNAL MEDICINE: ICD-10-CM

## 2025-05-23 DIAGNOSIS — Z12.31 ENCOUNTER FOR SCREENING MAMMOGRAM FOR MALIGNANT NEOPLASM OF BREAST: ICD-10-CM

## 2025-05-23 DIAGNOSIS — Z12.11 SCREEN FOR COLON CANCER: ICD-10-CM

## 2025-05-23 DIAGNOSIS — K59.04 CHRONIC IDIOPATHIC CONSTIPATION: ICD-10-CM

## 2025-05-23 DIAGNOSIS — E66.811 OBESITY (BMI 30.0-34.9): Primary | ICD-10-CM

## 2025-05-23 DIAGNOSIS — G89.29 CHRONIC BILATERAL LOW BACK PAIN WITH RIGHT-SIDED SCIATICA: ICD-10-CM

## 2025-05-23 DIAGNOSIS — M46.1 SACROILIITIS: ICD-10-CM

## 2025-05-23 DIAGNOSIS — G43.719 INTRACTABLE CHRONIC MIGRAINE WITHOUT AURA AND WITHOUT STATUS MIGRAINOSUS: ICD-10-CM

## 2025-05-23 DIAGNOSIS — R68.89 HEAT INTOLERANCE: ICD-10-CM

## 2025-05-23 DIAGNOSIS — M54.41 CHRONIC BILATERAL LOW BACK PAIN WITH RIGHT-SIDED SCIATICA: ICD-10-CM

## 2025-05-23 DIAGNOSIS — I10 ESSENTIAL HYPERTENSION: ICD-10-CM

## 2025-05-23 LAB
OHS QRS DURATION: 82 MS
OHS QTC CALCULATION: 456 MS

## 2025-05-23 PROCEDURE — 93005 ELECTROCARDIOGRAM TRACING: CPT

## 2025-05-23 PROCEDURE — 77063 BREAST TOMOSYNTHESIS BI: CPT | Mod: 26,,, | Performed by: RADIOLOGY

## 2025-05-23 PROCEDURE — 99999 PR PBB SHADOW E&M-EST. PATIENT-LVL III: CPT | Mod: PBBFAC,,, | Performed by: PHYSICIAN ASSISTANT

## 2025-05-23 PROCEDURE — 77067 SCR MAMMO BI INCL CAD: CPT | Mod: 26,,, | Performed by: RADIOLOGY

## 2025-05-23 PROCEDURE — 71046 X-RAY EXAM CHEST 2 VIEWS: CPT | Mod: TC

## 2025-05-23 PROCEDURE — 93010 ELECTROCARDIOGRAM REPORT: CPT | Mod: ,,, | Performed by: INTERNAL MEDICINE

## 2025-05-23 PROCEDURE — 71046 X-RAY EXAM CHEST 2 VIEWS: CPT | Mod: 26,,, | Performed by: RADIOLOGY

## 2025-05-23 PROCEDURE — 77063 BREAST TOMOSYNTHESIS BI: CPT | Mod: TC

## 2025-05-23 NOTE — PROGRESS NOTES
Subjective:      Patient ID: Sarah Brown is a 44 y.o. female.    Chief Complaint: Annual Exam    HPI  The patient is here for a preop clearance to have liposuction 360 and BBL surgery.  The physician that is performing the surgery is Bob Aguirre.     The surgery is being planned for 6/24/2025.    I will send a copy of the referral to the surgeon at fax # [phone].    The patient states that there has not been previous problems with sedation.    Kidney disease, stroke, MI, arrhythmia, seizures, angina, asthma, diabetes, chest pain, thyroid disease, blood/bleeding disorders?  None.       Also needs a letter for her job allowing her to sit in a ergonomic chair to help with her sacroiliitis. She needs something with lumbar support.   Wt Readings from Last 3 Encounters:   05/23/25 1101 75.8 kg (167 lb 1.7 oz)   05/23/25 0816 75.8 kg (167 lb 1.7 oz)   05/23/23 0715 70.8 kg (156 lb 3.1 oz)        Problem List[1]  Past Surgical History:   Procedure Laterality Date    INJECTION OF ANESTHETIC AGENT INTO SACROILIAC JOINT Bilateral 5/23/2023    Procedure: Bilateral SIJ Injection RN IV Sedation;  Surgeon: Doris Mello MD;  Location: [facility];  Service: Pain Management;  Laterality: Bilateral;    SELECTIVE INJECTION OF ANESTHETIC AGENT AROUND LUMBAR SPINAL NERVE ROOT BY TRANSFORAMINAL APPROACH Bilateral 4/12/2023    Procedure: Bilateral L4/5 TF MARÍA RN IV Sedation;  Surgeon: Doris Mello MD;  Location: [facility];  Service: Pain Management;  Laterality: Bilateral;    TUBAL LIGATION  9/26/14       Problem List[2]    Current Medications[3]    Review of Systems   Constitutional:  Negative for activity change, appetite change, chills, diaphoresis, fatigue, fever and unexpected weight change.   HENT: Negative.  Negative for congestion, hearing loss, postnasal drip, rhinorrhea, sore throat, trouble swallowing and voice change.    Eyes: Negative.  Negative for visual disturbance.   Respiratory: Negative.  Negative  "for cough, choking, chest tightness and shortness of breath.    Cardiovascular:  Negative for chest pain, palpitations and leg swelling.   Gastrointestinal:  Negative for abdominal distention, abdominal pain, blood in stool, constipation, diarrhea, nausea and vomiting.   Endocrine: Negative for cold intolerance, heat intolerance, polydipsia and polyuria.   Genitourinary: Negative.  Negative for difficulty urinating and frequency.   Musculoskeletal:  Negative for arthralgias, back pain, gait problem, joint swelling and myalgias.   Skin:  Negative for color change, pallor, rash and wound.   Neurological:  Negative for dizziness, tremors, weakness, light-headedness, numbness and headaches.   Hematological:  Negative for adenopathy.   Psychiatric/Behavioral:  Negative for behavioral problems, confusion, self-injury, sleep disturbance and suicidal ideas. The patient is not nervous/anxious.      Objective:   /86 (BP Location: Right arm, Patient Position: Sitting)   Temp 99 °F (37.2 °C) (Tympanic)   Ht 5' 3" (1.6 m)   Wt 75.8 kg (167 lb 1.7 oz)   LMP 05/21/2025   BMI 29.60 kg/m²     Physical Exam  Vitals and nursing note reviewed.   Constitutional:       General: She is not in acute distress.     Appearance: Normal appearance. She is well-developed. She is not ill-appearing, toxic-appearing or diaphoretic.   HENT:      Head: Normocephalic and atraumatic.      Right Ear: External ear normal.      Left Ear: External ear normal.      Nose: Nose normal.      Mouth/Throat:      Mouth: Mucous membranes are moist.      Pharynx: No oropharyngeal exudate or posterior oropharyngeal erythema.   Eyes:      General: No scleral icterus.        Right eye: No discharge.         Left eye: No discharge.      Conjunctiva/sclera: Conjunctivae normal.      Pupils: Pupils are equal, round, and reactive to light.   Neck:      Thyroid: No thyromegaly.      Vascular: Normal carotid pulses. No carotid bruit or JVD.   Cardiovascular:      " Rate and Rhythm: Normal rate and regular rhythm.      Pulses: Normal pulses.      Heart sounds: Normal heart sounds. No murmur heard.     No friction rub. No gallop.   Pulmonary:      Effort: Pulmonary effort is normal. No accessory muscle usage or respiratory distress.      Breath sounds: Normal breath sounds. No stridor. No wheezing, rhonchi or rales.   Chest:      Chest wall: No tenderness.   Abdominal:      General: Bowel sounds are normal. There is no distension.      Palpations: Abdomen is soft. There is no mass.      Tenderness: There is no abdominal tenderness. There is no guarding or rebound.   Musculoskeletal:         General: No swelling, tenderness, deformity or signs of injury. Normal range of motion.      Cervical back: Normal range of motion and neck supple. No edema.   Lymphadenopathy:      Cervical: No cervical adenopathy.   Skin:     General: Skin is warm and dry.      Coloration: Skin is not jaundiced or pale.      Findings: No bruising, erythema or rash.      Nails: There is no clubbing.   Neurological:      Mental Status: She is alert and oriented to person, place, and time.      Cranial Nerves: No cranial nerve deficit.      Sensory: No sensory deficit.      Motor: No abnormal muscle tone.      Coordination: Coordination normal.      Deep Tendon Reflexes: Reflexes are normal and symmetric. Reflexes normal.   Psychiatric:         Attention and Perception: Attention and perception normal.         Mood and Affect: Mood normal.         Speech: Speech normal.         Behavior: Behavior normal. Behavior is cooperative.         Thought Content: Thought content normal. Thought content is not paranoid or delusional. Thought content does not include homicidal or suicidal ideation. Thought content does not include homicidal or suicidal plan.         Cognition and Memory: Cognition and memory normal.         Judgment: Judgment normal.       Hospital Outpatient Visit on 05/23/2025   Component Date Value Ref  Range Status    QRS Duration 05/23/2025 82  ms Final    [facility] QTC Calculation 05/23/2025 456  ms Final   Lab Visit on 05/23/2025   Component Date Value Ref Range Status    Sodium 05/23/2025 140  136 - 145 mmol/L Final    Potassium 05/23/2025 3.3 (L)  3.5 - 5.1 mmol/L Final    Chloride 05/23/2025 97  95 - 110 mmol/L Final    CO2 05/23/2025 27  23 - 29 mmol/L Final    Glucose 05/23/2025 162 (H)  70 - 110 mg/dL Final    BUN 05/23/2025 7  6 - 20 mg/dL Final    Creatinine 05/23/2025 0.8  0.5 - 1.4 mg/dL Final    Calcium 05/23/2025 9.9  8.7 - 10.5 mg/dL Final    Protein Total 05/23/2025 8.5 (H)  6.0 - 8.4 gm/dL Final    Albumin 05/23/2025 4.1  3.5 - 5.2 g/dL Final    Bilirubin Total 05/23/2025 0.4  0.1 - 1.0 mg/dL Final    For infants and newborns, interpretation of results should be based   on gestational age, weight and in agreement with clinical   observations.    Premature Infant recommended reference ranges:   0-24 hours:  <8.0 mg/dL   24-48 hours: <12.0 mg/dL   3-5 days:    <15.0 mg/dL   6-29 days:   <15.0 mg/dL    ALP 05/23/2025 78  40 - 150 unit/L Final    AST 05/23/2025 33  11 - 45 unit/L Final    ALT 05/23/2025 44  10 - 44 unit/L Final    Anion Gap 05/23/2025 16  8 - 16 mmol/L Final    eGFR 05/23/2025 >60  >60 mL/min/1.73/m2 Final    Estimated GFR calculated using the CKD-EPI creatinine (2021) equation.    Cholesterol Total 05/23/2025 186  120 - 199 mg/dL Final    The National Cholesterol Education Program (NCEP) has set the  following guidelines (reference ranges) for Cholesterol:  Optimal.....................<200 mg/dL  Borderline High.............200-239 mg/dL  High........................> or = 240 mg/dL    Triglyceride 05/23/2025 103  30 - 150 mg/dL Final    The National Cholesterol Education Program (NCEP) has set the  following guidelines (reference values) for triglycerides:  Normal......................<150 mg/dL  Borderline High.............150-199 mg/dL  High........................200-499 mg/dL    HDL  Cholesterol 05/23/2025 61  40 - 75 mg/dL Final    The National Cholesterol Education Program (NCEP) has set the   following guidelines (reference values) for HDL Cholesterol:   Low...............<40 mg/dL   Optimal...........>60 mg/dL    LDL Cholesterol 05/23/2025 104.4  63.0 - 159.0 mg/dL Final    The National Cholesterol Education Program (NCEP) has set the  following guidelines (reference values) for LDL Cholesterol:  Optimal.......................<130 mg/dL  Borderline High...............130-159 mg/dL  High..........................160-189 mg/dL  Very High.....................>190 mg/dL  LDL calculated using the Friedewald equation.    HDL/Cholesterol Ratio 05/23/2025 32.8  20.0 - 50.0 % Final    Cholesterol/HDL Ratio 05/23/2025 3.0  2.0 - 5.0 Final    Non HDL Cholesterol 05/23/2025 125  mg/dL Final    Risk category and Non-HDL cholesterol goals:  Coronary heart disease (CHD)or equivalent (10-year risk of CHD >20%):  Non-HDL cholesterol goal     <130 mg/dL  Two or more CHD risk factors and 10-year risk of CHD <= 20%:  Non-HDL cholesterol goal     <160 mg/dL  0 to 1 CHD risk factor:  Non-HDL cholesterol goal     <190 mg/dL    PTT 05/23/2025 25.4  21.0 - 32.0 seconds Final    Refer to local heparin nomogram for intensity/dose specific therapeutic range.    PT 05/23/2025 10.4  9.0 - 12.5 seconds Final    INR 05/23/2025 0.9  0.8 - 1.2 Final    Coumadin Therapy:    2.0 - 3.0 for INR for all indicators except mechanical heart valves    and antiphospholipid syndromes which should use 2.5 - 3.5.    Color, UA 05/23/2025 Yellow  Straw, Nirmala, Yellow, Light-Orange Final    Appearance, UA 05/23/2025 Clear  Clear Final    pH, UA 05/23/2025 6.0  5.0 - 8.0 Final    Spec Grav UA 05/23/2025 1.015  1.005 - 1.030 Final    Protein, UA 05/23/2025 Negative  Negative Final    Recommend a 24 hour urine protein or a urine protein/creatinine ratio if globulin induced proteinuria is clinically suspected.    Glucose, UA 05/23/2025 Negative   Negative Final    Ketones, UA 05/23/2025 Negative  Negative Final    Bilirubin, UA 05/23/2025 Negative  Negative Final    Blood, UA 05/23/2025 3+ (A)  Negative Final    Nitrites, UA 05/23/2025 Negative  Negative Final    Leukocyte Esterase, UA 05/23/2025 Negative  Negative Final    HIV 1/2 Ag/Ab 05/23/2025 Non-Reactive  Non-Reactive Final    Beta HCG Quant 05/23/2025 2.60  See Text mIU/mL Final    Quantitative Total HCG Reference Ranges:  Males.....................<5.0 mIU/mL  Non-Pregnant Females:  18Y-41Y pre-menopausal....<2.4 mIU/mL  42Y-55Y [place]-menopausal...<=4.9 mIU/mL  55Y post-menopausal.......<=7.6 mIU/mL  Pregnancy:  Weeks post-LMP..........Range (mIU/mL)  1-10  weeks................202-231,000  11-15 weeks.............22,536-234,990  16-22 weeks...............8,007-50,064  23-40 weeks...............1,600-49,413    NOTE:  This assay is not FDA approved for tumor screening,   diagnosis, or monitoring.    Hemoglobin A1c 05/23/2025 6.6 (H)  4.0 - 5.6 % Final    ADA Screening Guidelines:  5.7-6.4%  Consistent with prediabetes  >=6.5%  Consistent with diabetes    High levels of fetal hemoglobin interfere with the HbA1C  assay. Heterozygous hemoglobin variants (HbS, HgC, etc)do  not significantly interfere with this assay.   However, presence of multiple variants may affect accuracy.    Estimated Average Glucose 05/23/2025 143 (H)  68 - 131 mg/dL Final    TSH 05/23/2025 1.499  0.400 - 4.000 uIU/mL Final    WBC 05/23/2025 4.36  3.90 - 12.70 K/uL Final    RBC 05/23/2025 4.80  4.00 - 5.40 M/uL Final    HGB 05/23/2025 13.3  12.0 - 16.0 gm/dL Final    HCT 05/23/2025 42.6  37.0 - 48.5 % Final    MCV 05/23/2025 89  82 - 98 fL Final    MCH 05/23/2025 27.7  27.0 - 31.0 pg Final    MCHC 05/23/2025 31.2 (L)  32.0 - 36.0 g/dL Final    RDW 05/23/2025 12.7  11.5 - 14.5 % Final    Platelet Count 05/23/2025 289  150 - 450 K/uL Final    MPV 05/23/2025 10.8  9.2 - 12.9 fL Final    Nucleated RBC 05/23/2025 0  <=0 /100 WBC Final     Neut % 05/23/2025 47.3  38 - 73 % Final    Lymph % 05/23/2025 39.9  18 - 48 % Final    Mono % 05/23/2025 9.6  4 - 15 % Final    Eos % 05/23/2025 2.3  <=8 % Final    Basophil % 05/23/2025 0.7  <=1.9 % Final    Imm Grans % 05/23/2025 0.2  0.0 - 0.5 % Final    Neut # 05/23/2025 2.06  1.8 - 7.7 K/uL Final    Lymph # 05/23/2025 1.74  1 - 4.8 K/uL Final    Mono # 05/23/2025 0.42  0.3 - 1 K/uL Final    Eos # 05/23/2025 0.10  <=0.5 K/uL Final    Baso # 05/23/2025 0.03  <=0.2 K/uL Final    Imm Grans # 05/23/2025 0.01  0.00 - 0.04 K/uL Final    Mild elevation in immature granulocytes is non specific and can be seen in a variety of conditions including stress response, acute inflammation, trauma and pregnancy. Correlation with other laboratory and clinical findings is essential.    Extra Tube 05/23/2025 Hold for add-ons.   Final    Auto resulted.       RBC, UA 05/23/2025 7 (H)  0 - 4 /HPF Final    WBC, UA 05/23/2025 1  0 - 5 /HPF Final    Bacteria, UA 05/23/2025 Rare  None, Rare, Occasional /HPF Final    Squamous Epithelial Cells, UA 05/23/2025 2  /HPF Final    Microscopic Comment 05/23/2025    Final    Other formed elements not mentioned in the report are not present in the microscopic examination.     EXAM:  XR CHEST PA AND LATERAL     CLINICAL HISTORY: Pre-op; [Z01.818]-Encounter for other preprocedural examination.     COMPARISON: None     FINDINGS: The size and contour of the heart are normal. The lungs are clear. There is no pneumothorax or pleural effusion.        Impression:   Normal study.     Finalized on: 5/23/2025 3:23 PM By:  Yayo Jackson MD  [facility]# [id number]      2025-05-23 15:25:52.250     [facility]        Exam Ended: 05/23/25 09:48 CDT Last Resulted: 05/23/25 15:23 CDT         Results for orders placed or performed during the hospital encounter of 05/23/25   EKG 12-lead    Collection Time: 05/23/25  9:27 AM   Result Value Ref Range    QRS Duration 82 ms    [facility]HS QTC Calculation 456 ms    Narrative    Test Reason  : Z01.818,    Vent. Rate :  78 BPM     Atrial Rate :  78 BPM     P-R Int : 154 ms          QRS Dur :  82 ms      QT Int : 400 ms       P-R-T Axes :  49  -3  32 degrees    QTcB Int : 456 ms    Normal sinus rhythm  Minimal voltage criteria for LVH, may be normal variant ( R in aVL )  Possible Anterior infarct ,age undetermined  Abnormal ECG  When compared with ECG of 22-Jul-2020 15:25,  No significant change was found  Confirmed by Klever Bailon ([id number]) on 5/23/2025 2:44:11 PM    Referred By: AALIYAH YANG           Confirmed By: Klever Bailon        Assessment:     1. Obesity (BMI 30.0-34.9)    2. Screen for colon cancer    3. Preop exam for internal medicine    4. Essential hypertension    5. Chronic idiopathic constipation    6. Intractable chronic migraine without aura and without status migrainosus    7. Sacroiliitis    8. Chronic bilateral low back pain with right-sided sciatica    9. Encounter for screening mammogram for malignant neoplasm of breast    10. Heat intolerance    11. Night sweats      Plan:   Obesity (BMI 30.0-34.9)    Screen for colon cancer  -     Cologuard Screening (Multitarget Stool DNA); Future; Expected date: 05/23/2025    Preop exam for internal medicine  -     CBC Auto Differential; Future; Expected date: 05/23/2025  -     Comprehensive Metabolic Panel; Future  -     Lipid Panel; Future; Expected date: 05/23/2025  -     EKG 12-lead; Future  -     APTT; Future; Expected date: 05/23/2025  -     Protime-INR; Future; Expected date: 05/23/2025  -     X-Ray Chest PA And Lateral; Future; Expected date: 05/23/2025  -     Urinalysis, Reflex to Urine Culture; Future; Expected date: 05/23/2025  -     HIV 1/2 Ag/Ab (4th Gen); Future; Expected date: 05/23/2025  -     HCG, Quantitative; Future; Expected date: 05/23/2025  -     Hemoglobin A1C; Future; Expected date: 05/23/2025    Essential hypertension  -stable and controlled on hctz and nifedipine    Chronic idiopathic constipation  -stable      Intractable chronic migraine without aura and without status migrainosus  -stable    Sacroiliitis  Chronic bilateral low back pain with right-sided sciatica  -stable    Encounter for screening mammogram for malignant neoplasm of breast  -     Mammo Digital Screening Bilat w/ Emeterio (XPD); Future; Expected date: 05/23/2025    Heat intolerance  -     TSH; Future    Night sweats  -     TSH; Future    NEW ONSET DIABETES ON HER LABS. SHE IS SCHEDULED FOR A FOLLOW UP TO DISCUSS TREATMENT OPTIONS.     BASED ON MY ASSESSMENT, I FIND [facility] AT LOW RISK FOR HER UPCOMING SURGERY.     Follow up in about 4 weeks (around 6/20/2025), or if symptoms worsen or fail to improve.           [1]   Patient Active Problem List  Diagnosis    Essential hypertension    Migraines    Osteoarthritis of spine with radiculopathy, lumbar region    Chronic idiopathic constipation    Obesity (BMI 30.0-34.9)    Chronic bilateral low back pain with right-sided sciatica    Weakness    Lumbar radiculopathy    Sacroiliitis   [2]   Patient Active Problem List  Diagnosis    Essential hypertension    Migraines    Osteoarthritis of spine with radiculopathy, lumbar region    Chronic idiopathic constipation    Obesity (BMI 30.0-34.9)    Chronic bilateral low back pain with right-sided sciatica    Weakness    Lumbar radiculopathy    Sacroiliitis   [3]   Current Outpatient Medications:     hydroCHLOROthiazide (HYDRODIURIL) 25 MG tablet, TAKE 1 TABLET BY MOUTH EVERY DAY, Disp: 90 tablet, Rfl: 1    NIFEdipine (PROCARDIA-XL) 60 MG (OSM) 24 hr tablet, TAKE 1 TABLET BY MOUTH 2 TIMES A DAY., Disp: 180 tablet, Rfl: 1

## 2025-05-26 ENCOUNTER — TELEPHONE (OUTPATIENT)
Dept: RADIOLOGY | Facility: HOSPITAL | Age: 45
End: 2025-05-26
Payer: COMMERCIAL

## 2025-05-28 ENCOUNTER — TELEPHONE (OUTPATIENT)
Dept: INTERNAL MEDICINE | Facility: CLINIC | Age: 45
End: 2025-05-28
Payer: COMMERCIAL

## 2025-05-28 NOTE — TELEPHONE ENCOUNTER
Copied from CRM #[id number]. Topic: General Inquiry - Patient Advice  >> May 28, 2025  2:32 PM Heather wrote:  Type: Request a call back    Name of Caller: pt  Best Call Back Number: [phone]   Additional Information:  pt is calling in rgd to her clearance for surgery that was sent Dr. Miller but the chest x ray was missing.  Please fax it to Dr. Cadet

## 2025-06-04 ENCOUNTER — PATIENT MESSAGE (OUTPATIENT)
Dept: INTERNAL MEDICINE | Facility: CLINIC | Age: 45
End: 2025-06-04
Payer: COMMERCIAL

## 2025-06-10 ENCOUNTER — PATIENT MESSAGE (OUTPATIENT)
Dept: INTERNAL MEDICINE | Facility: CLINIC | Age: 45
End: 2025-06-10
Payer: COMMERCIAL

## 2025-06-10 NOTE — TELEPHONE ENCOUNTER
Pt would like copy of EKG strip to be left a reception desk. States daughter will come in to .//ddw

## 2025-06-30 ENCOUNTER — HOSPITAL ENCOUNTER (OUTPATIENT)
Dept: RADIOLOGY | Facility: HOSPITAL | Age: 45
Discharge: HOME | End: 2025-06-30
Attending: PHYSICIAN ASSISTANT
Payer: COMMERCIAL

## 2025-06-30 DIAGNOSIS — R92.8 ABNORMAL MAMMOGRAM: ICD-10-CM

## 2025-06-30 PROCEDURE — 76642 ULTRASOUND BREAST LIMITED: CPT | Mod: 26,RT,, | Performed by: STUDENT IN AN ORGANIZED HEALTH CARE EDUCATION/TRAINING PROGRAM

## 2025-06-30 PROCEDURE — 76642 ULTRASOUND BREAST LIMITED: CPT | Mod: TC,RT
